# Patient Record
Sex: MALE | Race: WHITE | NOT HISPANIC OR LATINO | Employment: FULL TIME | URBAN - METROPOLITAN AREA
[De-identification: names, ages, dates, MRNs, and addresses within clinical notes are randomized per-mention and may not be internally consistent; named-entity substitution may affect disease eponyms.]

---

## 2018-02-12 ENCOUNTER — HOSPITAL ENCOUNTER (OUTPATIENT)
Dept: FAMILY MEDICINE CLINIC | Facility: CLINIC | Age: 53
Setting detail: SPECIMEN
Discharge: HOME | End: 2018-02-12
Attending: INTERNAL MEDICINE | Admitting: INTERNAL MEDICINE

## 2018-02-13 LAB
ALBUMIN SERPL-MCNC: 4.5 G/DL (ref 3.5–4.8)
ALBUMIN/GLOB SERPL: 1.4 {RATIO} (ref 1–1.7)
ALP SERPL-CCNC: 64 IU/L (ref 32–91)
ALT SERPL-CCNC: 26 IU/L (ref 17–63)
ANION GAP SERPL CALC-SCNC: 11.2 MMOL/L (ref 10–20)
AST SERPL-CCNC: 25 IU/L (ref 15–41)
BASOPHILS # BLD AUTO: 0 10*3/UL (ref 0–0.2)
BASOPHILS NFR BLD AUTO: 1 % (ref 0–2)
BILIRUB SERPL-MCNC: 1.1 MG/DL (ref 0.3–1.2)
BUN SERPL-MCNC: 9 MG/DL (ref 8–20)
BUN/CREAT SERPL: 11.3 (ref 6.2–20.3)
CALCIUM SERPL-MCNC: 9.4 MG/DL (ref 8.9–10.3)
CHLORIDE SERPL-SCNC: 103 MMOL/L (ref 101–111)
CHOLEST SERPL-MCNC: 150 MG/DL
CHOLEST/HDLC SERPL: 3.8 {RATIO}
CONV CO2: 28 MMOL/L (ref 22–32)
CONV LDL CHOLESTEROL DIRECT: 100 MG/DL (ref 0–100)
CONV TOTAL PROTEIN: 7.8 G/DL (ref 6.1–7.9)
CREAT UR-MCNC: 0.8 MG/DL (ref 0.7–1.2)
DIFFERENTIAL METHOD BLD: (no result)
EOSINOPHIL # BLD AUTO: 0.2 10*3/UL (ref 0–0.3)
EOSINOPHIL # BLD AUTO: 4 % (ref 0–3)
ERYTHROCYTE [DISTWIDTH] IN BLOOD BY AUTOMATED COUNT: 13.7 % (ref 11.5–14.5)
GLOBULIN UR ELPH-MCNC: 3.3 G/DL (ref 2.5–3.8)
GLUCOSE SERPL-MCNC: 85 MG/DL (ref 65–99)
HCT VFR BLD AUTO: 43.1 % (ref 40–54)
HDLC SERPL-MCNC: 39 MG/DL
HGB BLD-MCNC: 14.8 G/DL (ref 14–18)
LDLC/HDLC SERPL: 2.5 {RATIO}
LIPID INTERPRETATION: ABNORMAL
LYMPHOCYTES # BLD AUTO: 1.4 10*3/UL (ref 0.8–4.8)
LYMPHOCYTES NFR BLD AUTO: 27 % (ref 18–42)
MCH RBC QN AUTO: 29.2 PG (ref 26–32)
MCHC RBC AUTO-ENTMCNC: 34.4 G/DL (ref 32–36)
MCV RBC AUTO: 84.9 FL (ref 80–94)
MONOCYTES # BLD AUTO: 0.6 10*3/UL (ref 0.1–1.3)
MONOCYTES NFR BLD AUTO: 11 % (ref 2–11)
NEUTROPHILS # BLD AUTO: 3.1 10*3/UL (ref 2.3–8.6)
NEUTROPHILS NFR BLD AUTO: 57 % (ref 50–75)
NRBC BLD AUTO-RTO: 0 /100{WBCS}
NRBC/RBC NFR BLD MANUAL: 0 10*3/UL
PLATELET # BLD AUTO: 236 10*3/UL (ref 150–450)
PMV BLD AUTO: 9.8 FL (ref 7.4–10.4)
POTASSIUM SERPL-SCNC: 4.2 MMOL/L (ref 3.6–5.1)
RBC # BLD AUTO: 5.07 10*6/UL (ref 4.6–6)
SODIUM SERPL-SCNC: 138 MMOL/L (ref 136–144)
TRIGL SERPL-MCNC: 95 MG/DL
VLDLC SERPL CALC-MCNC: 11 MG/DL
WBC # BLD AUTO: 5.3 10*3/UL (ref 4.5–11.5)

## 2019-11-14 ENCOUNTER — TELEPHONE (OUTPATIENT)
Dept: FAMILY MEDICINE CLINIC | Facility: CLINIC | Age: 54
End: 2019-11-14

## 2019-11-14 NOTE — TELEPHONE ENCOUNTER
Patient called stating that he had biometric screening done for work and while filling out paperwork there was a question asking if patient had ever had trouble with memory issues. Patient checked yes, thinking that he has forgotten where he put his keys in the past or had forgotten a persons name. Patient states that after turning in paperwork he was contacted stating that he needed to get a letter from a specialist stating that his memory was ok and he was cleared to work. Patient states that this has been blown out of proportion and is asking if he can see you and get a paperwork signed off on that his memory is fine. Please advise

## 2019-11-15 ENCOUNTER — FLU SHOT (OUTPATIENT)
Dept: FAMILY MEDICINE CLINIC | Facility: CLINIC | Age: 54
End: 2019-11-15

## 2019-11-15 ENCOUNTER — TELEPHONE (OUTPATIENT)
Dept: FAMILY MEDICINE CLINIC | Facility: CLINIC | Age: 54
End: 2019-11-15

## 2019-11-15 DIAGNOSIS — Z23 NEEDS FLU SHOT: Primary | ICD-10-CM

## 2019-11-15 PROCEDURE — 90674 CCIIV4 VAC NO PRSV 0.5 ML IM: CPT | Performed by: INTERNAL MEDICINE

## 2019-11-15 PROCEDURE — 90471 IMMUNIZATION ADMIN: CPT | Performed by: INTERNAL MEDICINE

## 2019-11-15 NOTE — TELEPHONE ENCOUNTER
"Patient came to  stating that he filled out a work physical questionnaire and that one of the questions on it was \"Have you ever experienced memory loss?\", at the bottom of the questionnaire it stated if patient marked no and was not honest they would be dismissed from the company. Patient stated that he was forgetful at times so he marked \"Yes\" on the form, not realizing that they were asking if he had a serious illness . Patient then was assigned a  due to the company believing him having a chronic ongoing illness such as Dementia or Alzheimer's. Patient stated that he tried to clear up the issue but that they will not release him without medical clearance. Patient stated that he isn't sure if he needs to be seen by BFL or if she would be okay just writing a letter stating that patient is cleared for work.     Patient turned in paperwork to Chela Ness MA, for BFL to fill out. Patient is requesting for BFL to fill out paperwork and have it faxed to employer if possible.   "

## 2019-11-18 ENCOUNTER — OFFICE VISIT (OUTPATIENT)
Dept: FAMILY MEDICINE CLINIC | Facility: CLINIC | Age: 54
End: 2019-11-18

## 2019-11-18 VITALS
HEART RATE: 56 BPM | TEMPERATURE: 97.9 F | WEIGHT: 216.6 LBS | DIASTOLIC BLOOD PRESSURE: 86 MMHG | SYSTOLIC BLOOD PRESSURE: 154 MMHG | BODY MASS INDEX: 29.34 KG/M2 | OXYGEN SATURATION: 99 % | HEIGHT: 72 IN | RESPIRATION RATE: 12 BRPM

## 2019-11-18 DIAGNOSIS — I10 ESSENTIAL HYPERTENSION: ICD-10-CM

## 2019-11-18 DIAGNOSIS — Z00.00 PREVENTATIVE HEALTH CARE: ICD-10-CM

## 2019-11-18 DIAGNOSIS — R41.3 MEMORY CHANGES: Primary | ICD-10-CM

## 2019-11-18 DIAGNOSIS — Z12.5 SCREENING FOR PROSTATE CANCER: ICD-10-CM

## 2019-11-18 PROCEDURE — 36415 COLL VENOUS BLD VENIPUNCTURE: CPT | Performed by: INTERNAL MEDICINE

## 2019-11-18 PROCEDURE — 85007 BL SMEAR W/DIFF WBC COUNT: CPT | Performed by: INTERNAL MEDICINE

## 2019-11-18 PROCEDURE — 84443 ASSAY THYROID STIM HORMONE: CPT | Performed by: INTERNAL MEDICINE

## 2019-11-18 PROCEDURE — G0103 PSA SCREENING: HCPCS | Performed by: INTERNAL MEDICINE

## 2019-11-18 PROCEDURE — 80061 LIPID PANEL: CPT | Performed by: INTERNAL MEDICINE

## 2019-11-18 PROCEDURE — 85025 COMPLETE CBC W/AUTO DIFF WBC: CPT | Performed by: INTERNAL MEDICINE

## 2019-11-18 PROCEDURE — 99214 OFFICE O/P EST MOD 30 MIN: CPT | Performed by: INTERNAL MEDICINE

## 2019-11-18 PROCEDURE — 80053 COMPREHEN METABOLIC PANEL: CPT | Performed by: INTERNAL MEDICINE

## 2019-11-18 RX ORDER — LOSARTAN POTASSIUM 50 MG/1
50 TABLET ORAL DAILY
Refills: 3 | COMMUNITY
Start: 2019-11-08 | End: 2019-11-18 | Stop reason: SDUPTHER

## 2019-11-18 RX ORDER — LOSARTAN POTASSIUM 50 MG/1
50 TABLET ORAL DAILY
Qty: 90 TABLET | Refills: 3 | Status: SHIPPED | OUTPATIENT
Start: 2019-11-18 | End: 2020-02-03

## 2019-11-18 NOTE — PROGRESS NOTES
"Subjective   Kenny Cruz is a 54 y.o. male.     Pt is here for med check htn and also marked \"yes\" to memory difficulties on a work form.  Subsequently, work advised him to have further evaluation.  Pt says this was a mistake - he was thinking of normal age related forgetfulness, like misplacing keys and people's names.  Does not have any significant issues with ADLs, paying bills, etc.    Also has not been seen for >1 year.  Initially did not take his bp medication, but has been compliant since starting it.  No adverse effects.  Doesn't check his bp regularly, but was normal at work physical.  Also noted that when he exercises, bp is great, and read that limiting salt intake will lower bp.         The following portions of the patient's history were reviewed and updated as appropriate: allergies, current medications, past family history, past medical history, past social history, past surgical history and problem list.    Review of Systems   Constitutional: Negative for fatigue and fever.   HENT: Negative for congestion, ear pain, rhinorrhea and sore throat.    Eyes: Negative for blurred vision and itching.   Respiratory: Negative for cough and shortness of breath.    Cardiovascular: Negative for chest pain and palpitations.   Gastrointestinal: Negative for abdominal pain, diarrhea and vomiting.   Endocrine: Negative for polydipsia and polyuria.   Genitourinary: Negative for dysuria, frequency, hematuria and urgency.   Musculoskeletal: Negative for joint swelling and myalgias.   Skin: Negative for rash and skin lesions.   Neurological: Negative for dizziness, numbness and headache.   Psychiatric/Behavioral: Negative for depressed mood. The patient is not nervous/anxious.          Current Outpatient Medications:   •  losartan (COZAAR) 50 MG tablet, Take 50 mg by mouth Daily., Disp: , Rfl: 3    Objective   /86 (BP Location: Right arm, Patient Position: Sitting, Cuff Size: Adult)   Pulse 56   Temp 97.9 °F " "(36.6 °C) (Oral)   Resp 12   Ht 182.9 cm (72\")   Wt 98.2 kg (216 lb 9.6 oz)   SpO2 99%   BMI 29.38 kg/m²   Physical Exam   Constitutional: He is oriented to person, place, and time. He appears well-developed and well-nourished. No distress.   HENT:   Head: Normocephalic and atraumatic.   Right Ear: cerumen impaction is present.  Left Ear: An impacted cerumen is present.  Mouth/Throat: Oropharynx is clear and moist. No oropharyngeal exudate.   Eyes: Conjunctivae and EOM are normal. Right eye exhibits no discharge. Left eye exhibits no discharge. No scleral icterus.   Neck: Normal range of motion. Neck supple. No thyromegaly present.   Cardiovascular: Normal rate, regular rhythm and normal heart sounds. Exam reveals no gallop and no friction rub.   No murmur heard.  Pulmonary/Chest: Effort normal and breath sounds normal. No respiratory distress. He has no wheezes. He has no rales.   Abdominal: Soft. Bowel sounds are normal. He exhibits no distension. There is no tenderness. There is no guarding.   Musculoskeletal: Normal range of motion. He exhibits no edema or deformity.   Lymphadenopathy:     He has no cervical adenopathy.   Neurological: He is alert and oriented to person, place, and time. He has normal strength. No cranial nerve deficit or sensory deficit. Coordination and gait normal. GCS eye subscore is 4. GCS verbal subscore is 5. GCS motor subscore is 6.   MMSE:  Season: fall  Year: 2019  President: [facility] [place]: [place]  County: [place]  Floor: 3rd floor  Commands: passed  Write sentence: passed  3 words: passed  Clock: passed   Skin: Skin is warm and dry. No rash noted. He is not diaphoretic. No erythema.   Psychiatric: He has a normal mood and affect. His behavior is normal. Thought content normal.   Vitals reviewed.        Assessment/Plan   Problems Addressed this Visit     None      Visit Diagnoses     Memory changes    -  Primary    Essential hypertension        Relevant Medications    losartan " (COZAAR) 50 MG tablet    Preventative health care        Relevant Orders    CBC w AUTO Differential    Comprehensive metabolic panel    Lipid panel    TSH    CBC Auto Differential    Screening for prostate cancer        Relevant Orders    PSA SCREENING        Pt's history and exam consistent with normal age-related cognitive decline.  No need for further testing or referrals.  Pt will not need accommodations or restrictions, is allow to resume full duties.    Repeat manual bp 138/70.  Discussed that exercise and limiting salt intake will likely help with htn.  If he can do both consistently, then can dc bp med and see how bp readings are.  However, would not dc until he has consistently been able to exercise and limit salt, including in canned foods and boxed/preserved foods.  Due for labs - will check today         Procedures

## 2019-11-19 ENCOUNTER — TELEPHONE (OUTPATIENT)
Dept: FAMILY MEDICINE CLINIC | Facility: CLINIC | Age: 54
End: 2019-11-19

## 2019-11-19 LAB
ALBUMIN SERPL-MCNC: 4.6 G/DL (ref 3.5–5.2)
ALBUMIN/GLOB SERPL: 1.5 G/DL
ALP SERPL-CCNC: 64 U/L (ref 39–117)
ALT SERPL W P-5'-P-CCNC: 23 U/L (ref 1–41)
ANION GAP SERPL CALCULATED.3IONS-SCNC: 9.2 MMOL/L (ref 5–15)
AST SERPL-CCNC: 19 U/L (ref 1–40)
BASOPHILS # BLD MANUAL: 0.07 10*3/MM3 (ref 0–0.2)
BASOPHILS NFR BLD AUTO: 2 % (ref 0–1.5)
BILIRUB SERPL-MCNC: 0.8 MG/DL (ref 0.2–1.2)
BUN BLD-MCNC: 13 MG/DL (ref 6–20)
BUN/CREAT SERPL: 14.8 (ref 7–25)
CALCIUM SPEC-SCNC: 9.5 MG/DL (ref 8.6–10.5)
CHLORIDE SERPL-SCNC: 102 MMOL/L (ref 98–107)
CHOLEST SERPL-MCNC: 156 MG/DL (ref 0–200)
CO2 SERPL-SCNC: 31.8 MMOL/L (ref 22–29)
CREAT BLD-MCNC: 0.88 MG/DL (ref 0.76–1.27)
DEPRECATED RDW RBC AUTO: 42.2 FL (ref 37–54)
EOSINOPHIL # BLD MANUAL: 0.49 10*3/MM3 (ref 0–0.4)
EOSINOPHIL NFR BLD MANUAL: 14 % (ref 0.3–6.2)
ERYTHROCYTE [DISTWIDTH] IN BLOOD BY AUTOMATED COUNT: 13.1 % (ref 12.3–15.4)
GFR SERPL CREATININE-BSD FRML MDRD: 91 ML/MIN/1.73
GLOBULIN UR ELPH-MCNC: 3 GM/DL
GLUCOSE BLD-MCNC: 82 MG/DL (ref 65–99)
HCT VFR BLD AUTO: 42.2 % (ref 37.5–51)
HDLC SERPL-MCNC: 37 MG/DL (ref 40–60)
HGB BLD-MCNC: 14.5 G/DL (ref 13–17.7)
LDLC SERPL CALC-MCNC: 81 MG/DL (ref 0–100)
LDLC/HDLC SERPL: 2.18 {RATIO}
LYMPHOCYTES # BLD MANUAL: 2.19 10*3/MM3 (ref 0.7–3.1)
LYMPHOCYTES NFR BLD MANUAL: 14 % (ref 5–12)
LYMPHOCYTES NFR BLD MANUAL: 63 % (ref 19.6–45.3)
MCH RBC QN AUTO: 30.1 PG (ref 26.6–33)
MCHC RBC AUTO-ENTMCNC: 34.4 G/DL (ref 31.5–35.7)
MCV RBC AUTO: 87.6 FL (ref 79–97)
MONOCYTES # BLD AUTO: 0.49 10*3/MM3 (ref 0.1–0.9)
NEUTROPHILS # BLD AUTO: 0.24 10*3/MM3 (ref 1.7–7)
NEUTROPHILS NFR BLD MANUAL: 7 % (ref 42.7–76)
NRBC SPEC MANUAL: 1 /100 WBC (ref 0–0.2)
PLAT MORPH BLD: NORMAL
PLATELET # BLD AUTO: 192 10*3/MM3 (ref 140–450)
PMV BLD AUTO: 11.2 FL (ref 6–12)
POTASSIUM BLD-SCNC: 5 MMOL/L (ref 3.5–5.2)
PROT SERPL-MCNC: 7.6 G/DL (ref 6–8.5)
PSA SERPL-MCNC: 0.81 NG/ML (ref 0–4)
RBC # BLD AUTO: 4.82 10*6/MM3 (ref 4.14–5.8)
RBC MORPH BLD: NORMAL
SODIUM BLD-SCNC: 143 MMOL/L (ref 136–145)
TRIGL SERPL-MCNC: 192 MG/DL (ref 0–150)
TSH SERPL DL<=0.05 MIU/L-ACNC: 2.28 UIU/ML (ref 0.27–4.2)
VLDLC SERPL-MCNC: 38.4 MG/DL (ref 5–40)
WBC MORPH BLD: NORMAL
WBC NRBC COR # BLD: 3.48 10*3/MM3 (ref 3.4–10.8)

## 2019-11-19 NOTE — TELEPHONE ENCOUNTER
Patient left vm checking to see if letter has been faxed to his work. Patient states that employer needs paperwork by 11/20.

## 2019-11-19 NOTE — TELEPHONE ENCOUNTER
Patient was called and informed. Patient would like to  paperwork as well. Patient will  on Thursday.

## 2019-11-19 NOTE — TELEPHONE ENCOUNTER
VM MESSAGE.  Patient called to make sure you had sent the paperwork to his employer's medical office clearing him to go back to work. They need to receive it by tomorrow, 11/20/19, or he can't work. He would like to be called when it has been sent to them (can leave message). Thank you.

## 2019-11-19 NOTE — TELEPHONE ENCOUNTER
Yes, I completed it yesterday. I sent chart notes bc that's what it asked for, as well as the letter. Since it was late last night, was probably faxed in today

## 2020-02-03 RX ORDER — LOSARTAN POTASSIUM 50 MG/1
TABLET ORAL
Qty: 30 TABLET | Refills: 2 | Status: SHIPPED | OUTPATIENT
Start: 2020-02-03 | End: 2021-03-02

## 2021-02-09 RX ORDER — LOSARTAN POTASSIUM 50 MG/1
TABLET ORAL
Qty: 30 TABLET | Refills: 2 | OUTPATIENT
Start: 2021-02-09

## 2021-03-02 RX ORDER — LOSARTAN POTASSIUM 50 MG/1
TABLET ORAL
Qty: 30 TABLET | Refills: 2 | Status: SHIPPED | OUTPATIENT
Start: 2021-03-02 | End: 2021-06-09

## 2021-05-29 PROCEDURE — U0003 INFECTIOUS AGENT DETECTION BY NUCLEIC ACID (DNA OR RNA); SEVERE ACUTE RESPIRATORY SYNDROME CORONAVIRUS 2 (SARS-COV-2) (CORONAVIRUS DISEASE [COVID-19]), AMPLIFIED PROBE TECHNIQUE, MAKING USE OF HIGH THROUGHPUT TECHNOLOGIES AS DESCRIBED BY CMS-2020-01-R: HCPCS | Performed by: FAMILY MEDICINE

## 2021-05-29 PROCEDURE — 87081 CULTURE SCREEN ONLY: CPT | Performed by: FAMILY MEDICINE

## 2021-06-08 RX ORDER — LOSARTAN POTASSIUM 50 MG/1
TABLET ORAL
Qty: 30 TABLET | Refills: 2 | OUTPATIENT
Start: 2021-06-08

## 2021-06-09 ENCOUNTER — OFFICE VISIT (OUTPATIENT)
Dept: FAMILY MEDICINE CLINIC | Facility: CLINIC | Age: 56
End: 2021-06-09

## 2021-06-09 VITALS
BODY MASS INDEX: 33.18 KG/M2 | DIASTOLIC BLOOD PRESSURE: 90 MMHG | HEART RATE: 79 BPM | OXYGEN SATURATION: 97 % | TEMPERATURE: 98.5 F | HEIGHT: 72 IN | SYSTOLIC BLOOD PRESSURE: 140 MMHG | WEIGHT: 245 LBS

## 2021-06-09 DIAGNOSIS — I10 ESSENTIAL HYPERTENSION: Primary | ICD-10-CM

## 2021-06-09 PROCEDURE — 99214 OFFICE O/P EST MOD 30 MIN: CPT | Performed by: NURSE PRACTITIONER

## 2021-06-09 RX ORDER — LOSARTAN POTASSIUM AND HYDROCHLOROTHIAZIDE 12.5; 5 MG/1; MG/1
1 TABLET ORAL DAILY
Qty: 90 TABLET | Refills: 1 | Status: SHIPPED | OUTPATIENT
Start: 2021-06-09 | End: 2022-02-01 | Stop reason: SDUPTHER

## 2021-06-09 NOTE — ASSESSMENT & PLAN NOTE
Hypertension is worsening.  Dietary sodium restriction.  Weight loss.  Regular aerobic exercise.  Medication changes per orders.  Blood pressure will be reassessed in 4 weeks.  Repeat BP was 148/96. Will add hctz to losartan. Monitor BP at home, work on lifestlye changes, and follow up in 1 month.

## 2021-06-09 NOTE — PROGRESS NOTES
"Chago Cruz is a 55 y.o. male.     Chief Complaint   Patient presents with   • Hypertension     follow up on meds       /90 (BP Location: Right arm, Patient Position: Sitting, Cuff Size: Adult)   Pulse 79   Temp 98.5 °F (36.9 °C) (Skin)   Ht 182.9 cm (72\")   Wt 111 kg (245 lb)   SpO2 97%   BMI 33.23 kg/m²     BP Readings from Last 3 Encounters:   06/09/21 140/90   05/29/21 151/90   11/18/19 154/86       Wt Readings from Last 3 Encounters:   06/09/21 111 kg (245 lb)   05/29/21 99.8 kg (220 lb)   11/18/19 98.2 kg (216 lb 9.6 oz)       Pt comes in today for follow up on BP. Hasn't been seen since 2019. He takes losartan 50mg daily. Does get occasionally get headaches when he wakes up.   Denies any CP, SOA, palpitations, dizziness.        The following portions of the patient's history were reviewed and updated as appropriate: allergies, current medications, past family history, past medical history, past social history, past surgical history and problem list.    Review of Systems    Objective   Physical Exam  Constitutional:       Appearance: He is well-developed.   Eyes:      Pupils: Pupils are equal, round, and reactive to light.   Cardiovascular:      Rate and Rhythm: Normal rate and regular rhythm.   Pulmonary:      Effort: Pulmonary effort is normal.      Breath sounds: Normal breath sounds.   Neurological:      Mental Status: He is alert and oriented to person, place, and time.           Diagnoses and all orders for this visit:    1. Essential hypertension (Primary)  Assessment & Plan:  Hypertension is worsening.  Dietary sodium restriction.  Weight loss.  Regular aerobic exercise.  Medication changes per orders.  Blood pressure will be reassessed in 4 weeks.  Repeat BP was 148/96. Will add hctz to losartan. Monitor BP at home, work on lifestlye changes, and follow up in 1 month.     Orders:  -     losartan-hydrochlorothiazide (Hyzaar) 50-12.5 MG per tablet; Take 1 tablet by mouth Daily. "  Dispense: 90 tablet; Refill: 1  will add hctz  Follow up in 1 month for routine physical  During this office visit, we discussed the pertinent aspects of the visit and treatment recommendations. Pt verbalizes understanding. Follow up was discussed. Patient was given the opportunity to ask questions and discuss other concerns.         Return in about 4 weeks (around 7/7/2021) for Annual physical.

## 2022-02-01 DIAGNOSIS — I10 ESSENTIAL HYPERTENSION: ICD-10-CM

## 2022-02-01 RX ORDER — LOSARTAN POTASSIUM AND HYDROCHLOROTHIAZIDE 12.5; 5 MG/1; MG/1
1 TABLET ORAL DAILY
Qty: 90 TABLET | Refills: 1 | Status: SHIPPED | OUTPATIENT
Start: 2022-02-01 | End: 2022-07-11

## 2022-02-01 NOTE — TELEPHONE ENCOUNTER
Caller: [facility] - [place], [place] - [address] [place] [phone] [place] [address] [phone] FX    Relationship: Pharmacy    Best call back number: [phone]  Requested Prescriptions:   Requested Prescriptions     Pending Prescriptions Disp Refills   • losartan-hydrochlorothiazide (Hyzaar) 50-12.5 MG per tablet 90 tablet 1     Sig: Take 1 tablet by mouth Daily.        Pharmacy where request should be sent: [facility] - [place], [place] - [address] [place], [place] [phone] [place] [address] [phone] FX     Additional details provided by patient: REF [id number]    Does the patient have less than a 3 day supply:  [] Yes  [x] No    Sathya Campbell   02/01/22 15:07 EST

## 2022-07-11 DIAGNOSIS — I10 ESSENTIAL HYPERTENSION: ICD-10-CM

## 2022-07-11 RX ORDER — LOSARTAN POTASSIUM AND HYDROCHLOROTHIAZIDE 12.5; 5 MG/1; MG/1
TABLET ORAL
Qty: 90 TABLET | Refills: 0 | Status: SHIPPED | OUTPATIENT
Start: 2022-07-11 | End: 2022-10-10

## 2022-10-10 DIAGNOSIS — I10 ESSENTIAL HYPERTENSION: ICD-10-CM

## 2022-10-10 RX ORDER — LOSARTAN POTASSIUM AND HYDROCHLOROTHIAZIDE 12.5; 5 MG/1; MG/1
TABLET ORAL
Qty: 90 TABLET | Refills: 0 | Status: SHIPPED | OUTPATIENT
Start: 2022-10-10

## 2023-01-09 ENCOUNTER — TELEPHONE (OUTPATIENT)
Dept: FAMILY MEDICINE CLINIC | Facility: CLINIC | Age: 58
End: 2023-01-09
Payer: COMMERCIAL

## 2023-01-09 DIAGNOSIS — I10 ESSENTIAL HYPERTENSION: ICD-10-CM

## 2023-01-09 RX ORDER — LOSARTAN POTASSIUM AND HYDROCHLOROTHIAZIDE 12.5; 5 MG/1; MG/1
TABLET ORAL
Qty: 90 TABLET | Refills: 3 | OUTPATIENT
Start: 2023-01-09

## 2023-01-09 NOTE — TELEPHONE ENCOUNTER
Sent patient a text message stating that his rx was denied because he hasn't been seen in office in over a year and a half and to please call the office for an appt with [facility] to follow up on his bp.

## 2023-10-10 DIAGNOSIS — I10 ESSENTIAL HYPERTENSION: ICD-10-CM

## 2023-10-10 RX ORDER — LOSARTAN POTASSIUM AND HYDROCHLOROTHIAZIDE 12.5; 5 MG/1; MG/1
1 TABLET ORAL DAILY
Qty: 90 TABLET | Refills: 0 | Status: SHIPPED | OUTPATIENT
Start: 2023-10-10

## 2023-10-10 NOTE — TELEPHONE ENCOUNTER
Caller: TAMRA MINAYA    Relationship: Emergency Contact    Best call back number: [phone]    Requested Prescriptions:   Requested Prescriptions     Pending Prescriptions Disp Refills    losartan-hydrochlorothiazide (HYZAAR) 50-12.5 MG per tablet 90 tablet 0     Sig: Take 1 tablet by mouth Daily.        Pharmacy where request should be sent: [id number] - [place], [place] - [address] [place] [address] - [phone] [facility] [phone]      Last office visit with prescribing clinician: Visit date not found   Last telemedicine visit with prescribing clinician: Visit date not found   Next office visit with prescribing clinician: Visit date not found     Additional details provided by patient:     Does the patient have less than a 3 day supply:  [x] Yes  [] No    Would you like a call back once the refill request has been completed: [x] Yes [] No    If the office needs to give you a call back, can they leave a voicemail: [x] Yes [] No    Izabella Crowley Rep   10/10/23 11:04 EDT

## 2023-10-19 ENCOUNTER — OFFICE VISIT (OUTPATIENT)
Dept: FAMILY MEDICINE CLINIC | Facility: CLINIC | Age: 58
End: 2023-10-19
Payer: COMMERCIAL

## 2023-10-19 VITALS
OXYGEN SATURATION: 95 % | HEART RATE: 54 BPM | WEIGHT: 250 LBS | HEIGHT: 72 IN | DIASTOLIC BLOOD PRESSURE: 86 MMHG | SYSTOLIC BLOOD PRESSURE: 130 MMHG | BODY MASS INDEX: 33.86 KG/M2 | RESPIRATION RATE: 16 BRPM

## 2023-10-19 DIAGNOSIS — I10 ESSENTIAL HYPERTENSION: ICD-10-CM

## 2023-10-19 PROCEDURE — 99213 OFFICE O/P EST LOW 20 MIN: CPT | Performed by: PHYSICIAN ASSISTANT

## 2023-10-19 RX ORDER — LOSARTAN POTASSIUM AND HYDROCHLOROTHIAZIDE 12.5; 5 MG/1; MG/1
1 TABLET ORAL DAILY
Qty: 90 TABLET | Refills: 3 | Status: SHIPPED | OUTPATIENT
Start: 2023-10-19

## 2023-10-19 NOTE — PROGRESS NOTES
"Subjective   Kenny Cruz is a 58 y.o. male.     Chief Complaint   Patient presents with    Follow-up     meds       /86 (BP Location: Right arm, Patient Position: Sitting, Cuff Size: Large Adult)   Pulse 54   Resp 16   Ht 182.9 cm (72\")   Wt 113 kg (250 lb)   SpO2 95%   BMI 33.91 kg/m²     BP Readings from Last 3 Encounters:   10/19/23 130/86   06/09/21 140/90   05/29/21 151/90       Wt Readings from Last 3 Encounters:   10/19/23 113 kg (250 lb)   06/09/21 111 kg (245 lb)   05/29/21 99.8 kg (220 lb)       HPI Presents to the clinic for htn. He has a physical for work and his blood pressure was normal at work. He has not had chest pain, soa, regular headaches, abdominal pain. Works for the [employer] and has regular screening for vision. Has some headaches in the morning. He has not been tested for cpap. He is taking losartan/hctz regularly and does not miss doses.     The following portions of the patient's history were reviewed and updated as appropriate: allergies, current medications, past family history, past medical history, past social history, past surgical history, and problem list.    Review of Systems    Objective   Physical Exam  Constitutional:       Appearance: Normal appearance.   Eyes:      Extraocular Movements: Extraocular movements intact.      Pupils: Pupils are equal, round, and reactive to light.   Cardiovascular:      Rate and Rhythm: Normal rate.      Heart sounds: No murmur heard.  Pulmonary:      Effort: Pulmonary effort is normal.      Breath sounds: No wheezing.   Neurological:      General: No focal deficit present.      Mental Status: He is alert and oriented to person, place, and time.   Psychiatric:         Mood and Affect: Mood normal.         Behavior: Behavior normal.           Diagnoses and all orders for this visit:    1. Essential hypertension  -     losartan-hydrochlorothiazide (HYZAAR) 50-12.5 MG per tablet; Take 1 tablet by mouth Daily.  Dispense: 90 tablet; Refill: " 3  -     CBC w AUTO Differential; Future  -     Comprehensive metabolic panel; Future    Schedule for physical for the rest of labs with pcp. Schedule colonoscopy. Check psa    Return if symptoms worsen or fail to improve, for Recheck.

## 2024-10-16 DIAGNOSIS — I10 ESSENTIAL HYPERTENSION: ICD-10-CM

## 2024-10-16 RX ORDER — LOSARTAN POTASSIUM AND HYDROCHLOROTHIAZIDE 12.5; 5 MG/1; MG/1
1 TABLET ORAL DAILY
Qty: 90 TABLET | Refills: 3 | Status: SHIPPED | OUTPATIENT
Start: 2024-10-16

## 2024-11-15 ENCOUNTER — OFFICE VISIT (OUTPATIENT)
Dept: FAMILY MEDICINE CLINIC | Facility: CLINIC | Age: 59
End: 2024-11-15
Payer: COMMERCIAL

## 2024-11-15 ENCOUNTER — HOSPITAL ENCOUNTER (INPATIENT)
Facility: HOSPITAL | Age: 59
LOS: 2 days | Discharge: HOME | DRG: 310 | End: 2024-11-19
Attending: EMERGENCY MEDICINE
Payer: COMMERCIAL

## 2024-11-15 VITALS
OXYGEN SATURATION: 100 % | RESPIRATION RATE: 20 BRPM | HEIGHT: 72 IN | WEIGHT: 263 LBS | HEART RATE: 128 BPM | BODY MASS INDEX: 35.62 KG/M2 | DIASTOLIC BLOOD PRESSURE: 64 MMHG | SYSTOLIC BLOOD PRESSURE: 124 MMHG

## 2024-11-15 DIAGNOSIS — I48.91 ATRIAL FIBRILLATION WITH RAPID VENTRICULAR RESPONSE: Primary | ICD-10-CM

## 2024-11-15 DIAGNOSIS — R94.31 ABNORMAL ECG: ICD-10-CM

## 2024-11-15 DIAGNOSIS — R00.0 SINUS TACHYCARDIA: Primary | ICD-10-CM

## 2024-11-15 LAB
ANION GAP SERPL CALCULATED.3IONS-SCNC: 11.3 MMOL/L (ref 5–15)
BASOPHILS # BLD AUTO: 0.07 10*3/MM3 (ref 0–0.2)
BASOPHILS NFR BLD AUTO: 0.6 % (ref 0–1.5)
BUN SERPL-MCNC: 11 MG/DL (ref 6–20)
BUN/CREAT SERPL: 9.6 (ref 7–25)
CALCIUM SPEC-SCNC: 9.3 MG/DL (ref 8.6–10.5)
CHLORIDE SERPL-SCNC: 101 MMOL/L (ref 98–107)
CO2 SERPL-SCNC: 25.7 MMOL/L (ref 22–29)
CREAT SERPL-MCNC: 1.15 MG/DL (ref 0.76–1.27)
DEPRECATED RDW RBC AUTO: 43.3 FL (ref 37–54)
EGFRCR SERPLBLD CKD-EPI 2021: 74.2 ML/MIN/1.73
EOSINOPHIL # BLD AUTO: 0.24 10*3/MM3 (ref 0–0.4)
EOSINOPHIL NFR BLD AUTO: 2.1 % (ref 0.3–6.2)
ERYTHROCYTE [DISTWIDTH] IN BLOOD BY AUTOMATED COUNT: 13.2 % (ref 12.3–15.4)
GLUCOSE SERPL-MCNC: 103 MG/DL (ref 65–99)
HCT VFR BLD AUTO: 42.3 % (ref 37.5–51)
HGB BLD-MCNC: 14.2 G/DL (ref 13–17.7)
HOLD SPECIMEN: NORMAL
HOLD SPECIMEN: NORMAL
IMM GRANULOCYTES # BLD AUTO: 0.03 10*3/MM3 (ref 0–0.05)
IMM GRANULOCYTES NFR BLD AUTO: 0.3 % (ref 0–0.5)
LYMPHOCYTES # BLD AUTO: 1.67 10*3/MM3 (ref 0.7–3.1)
LYMPHOCYTES NFR BLD AUTO: 14.9 % (ref 19.6–45.3)
MAGNESIUM SERPL-MCNC: 2 MG/DL (ref 1.6–2.6)
MCH RBC QN AUTO: 30 PG (ref 26.6–33)
MCHC RBC AUTO-ENTMCNC: 33.6 G/DL (ref 31.5–35.7)
MCV RBC AUTO: 89.4 FL (ref 79–97)
MONOCYTES # BLD AUTO: 1.67 10*3/MM3 (ref 0.1–0.9)
MONOCYTES NFR BLD AUTO: 14.9 % (ref 5–12)
NEUTROPHILS NFR BLD AUTO: 67.2 % (ref 42.7–76)
NEUTROPHILS NFR BLD AUTO: 7.54 10*3/MM3 (ref 1.7–7)
NRBC BLD AUTO-RTO: 0 /100 WBC (ref 0–0.2)
PLATELET # BLD AUTO: 215 10*3/MM3 (ref 140–450)
PMV BLD AUTO: 11.1 FL (ref 6–12)
POTASSIUM SERPL-SCNC: 3.9 MMOL/L (ref 3.5–5.2)
QT INTERVAL: 431 MS
QTC INTERVAL: 567 MS
RBC # BLD AUTO: 4.73 10*6/MM3 (ref 4.14–5.8)
SODIUM SERPL-SCNC: 138 MMOL/L (ref 136–145)
TSH SERPL DL<=0.05 MIU/L-ACNC: 2.8 UIU/ML (ref 0.27–4.2)
WBC NRBC COR # BLD AUTO: 11.22 10*3/MM3 (ref 3.4–10.8)
WHOLE BLOOD HOLD COAG: NORMAL

## 2024-11-15 PROCEDURE — 93005 ELECTROCARDIOGRAM TRACING: CPT | Performed by: INTERNAL MEDICINE

## 2024-11-15 PROCEDURE — 25010000002 ENOXAPARIN PER 10 MG: Performed by: EMERGENCY MEDICINE

## 2024-11-15 PROCEDURE — 93005 ELECTROCARDIOGRAM TRACING: CPT

## 2024-11-15 PROCEDURE — G0378 HOSPITAL OBSERVATION PER HR: HCPCS

## 2024-11-15 PROCEDURE — 84443 ASSAY THYROID STIM HORMONE: CPT | Performed by: EMERGENCY MEDICINE

## 2024-11-15 PROCEDURE — 99291 CRITICAL CARE FIRST HOUR: CPT

## 2024-11-15 PROCEDURE — 99285 EMERGENCY DEPT VISIT HI MDM: CPT

## 2024-11-15 PROCEDURE — 83735 ASSAY OF MAGNESIUM: CPT | Performed by: EMERGENCY MEDICINE

## 2024-11-15 PROCEDURE — 93005 ELECTROCARDIOGRAM TRACING: CPT | Performed by: EMERGENCY MEDICINE

## 2024-11-15 PROCEDURE — 80048 BASIC METABOLIC PNL TOTAL CA: CPT | Performed by: EMERGENCY MEDICINE

## 2024-11-15 PROCEDURE — 85025 COMPLETE CBC W/AUTO DIFF WBC: CPT | Performed by: EMERGENCY MEDICINE

## 2024-11-15 RX ORDER — ENOXAPARIN SODIUM 150 MG/ML
1 INJECTION SUBCUTANEOUS ONCE
Status: COMPLETED | OUTPATIENT
Start: 2024-11-15 | End: 2024-11-15

## 2024-11-15 RX ORDER — ACETAMINOPHEN 500 MG
500 TABLET ORAL EVERY 6 HOURS PRN
COMMUNITY

## 2024-11-15 RX ORDER — SODIUM CHLORIDE 0.9 % (FLUSH) 0.9 %
10 SYRINGE (ML) INJECTION AS NEEDED
Status: DISCONTINUED | OUTPATIENT
Start: 2024-11-15 | End: 2024-11-19 | Stop reason: HOSPADM

## 2024-11-15 RX ORDER — DILTIAZEM HCL/D5W 125 MG/125
5-15 PLASTIC BAG, INJECTION (ML) INTRAVENOUS
Status: DISCONTINUED | OUTPATIENT
Start: 2024-11-15 | End: 2024-11-16

## 2024-11-15 RX ORDER — ENOXAPARIN SODIUM 150 MG/ML
1 INJECTION SUBCUTANEOUS EVERY 12 HOURS
Status: DISCONTINUED | OUTPATIENT
Start: 2024-11-16 | End: 2024-11-16

## 2024-11-15 RX ORDER — ACETAMINOPHEN 160 MG/5ML
650 SOLUTION ORAL EVERY 4 HOURS PRN
Status: DISCONTINUED | OUTPATIENT
Start: 2024-11-15 | End: 2024-11-19 | Stop reason: HOSPADM

## 2024-11-15 RX ORDER — NITROGLYCERIN 0.4 MG/1
0.4 TABLET SUBLINGUAL
Status: DISCONTINUED | OUTPATIENT
Start: 2024-11-15 | End: 2024-11-19 | Stop reason: HOSPADM

## 2024-11-15 RX ORDER — BISACODYL 10 MG
10 SUPPOSITORY, RECTAL RECTAL DAILY PRN
Status: DISCONTINUED | OUTPATIENT
Start: 2024-11-15 | End: 2024-11-19 | Stop reason: HOSPADM

## 2024-11-15 RX ORDER — ACETAMINOPHEN 325 MG/1
650 TABLET ORAL EVERY 4 HOURS PRN
Status: DISCONTINUED | OUTPATIENT
Start: 2024-11-15 | End: 2024-11-19 | Stop reason: HOSPADM

## 2024-11-15 RX ORDER — IPRATROPIUM BROMIDE AND ALBUTEROL SULFATE 2.5; .5 MG/3ML; MG/3ML
3 SOLUTION RESPIRATORY (INHALATION) EVERY 4 HOURS PRN
Status: DISCONTINUED | OUTPATIENT
Start: 2024-11-15 | End: 2024-11-17

## 2024-11-15 RX ORDER — DILTIAZEM HYDROCHLORIDE 5 MG/ML
20 INJECTION INTRAVENOUS ONCE
Status: COMPLETED | OUTPATIENT
Start: 2024-11-15 | End: 2024-11-15

## 2024-11-15 RX ORDER — POLYETHYLENE GLYCOL 3350 17 G/17G
17 POWDER, FOR SOLUTION ORAL DAILY PRN
Status: DISCONTINUED | OUTPATIENT
Start: 2024-11-15 | End: 2024-11-19 | Stop reason: HOSPADM

## 2024-11-15 RX ORDER — DILTIAZEM HYDROCHLORIDE 5 MG/ML
25 INJECTION INTRAVENOUS ONCE
Status: COMPLETED | OUTPATIENT
Start: 2024-11-15 | End: 2024-11-15

## 2024-11-15 RX ORDER — ONDANSETRON 4 MG/1
4 TABLET, ORALLY DISINTEGRATING ORAL EVERY 6 HOURS PRN
Status: DISCONTINUED | OUTPATIENT
Start: 2024-11-15 | End: 2024-11-19 | Stop reason: HOSPADM

## 2024-11-15 RX ORDER — BISACODYL 5 MG/1
5 TABLET, DELAYED RELEASE ORAL DAILY PRN
Status: DISCONTINUED | OUTPATIENT
Start: 2024-11-15 | End: 2024-11-19 | Stop reason: HOSPADM

## 2024-11-15 RX ORDER — AMOXICILLIN 250 MG
2 CAPSULE ORAL 2 TIMES DAILY PRN
Status: DISCONTINUED | OUTPATIENT
Start: 2024-11-15 | End: 2024-11-19 | Stop reason: HOSPADM

## 2024-11-15 RX ORDER — GUAIFENESIN 600 MG/1
600 TABLET, EXTENDED RELEASE ORAL EVERY 12 HOURS SCHEDULED
Status: DISCONTINUED | OUTPATIENT
Start: 2024-11-15 | End: 2024-11-19 | Stop reason: HOSPADM

## 2024-11-15 RX ORDER — SODIUM CHLORIDE 9 MG/ML
40 INJECTION, SOLUTION INTRAVENOUS AS NEEDED
Status: DISCONTINUED | OUTPATIENT
Start: 2024-11-15 | End: 2024-11-19 | Stop reason: HOSPADM

## 2024-11-15 RX ORDER — SODIUM CHLORIDE 0.9 % (FLUSH) 0.9 %
10 SYRINGE (ML) INJECTION EVERY 12 HOURS SCHEDULED
Status: DISCONTINUED | OUTPATIENT
Start: 2024-11-15 | End: 2024-11-19 | Stop reason: HOSPADM

## 2024-11-15 RX ORDER — ONDANSETRON 2 MG/ML
4 INJECTION INTRAMUSCULAR; INTRAVENOUS EVERY 6 HOURS PRN
Status: DISCONTINUED | OUTPATIENT
Start: 2024-11-15 | End: 2024-11-19 | Stop reason: HOSPADM

## 2024-11-15 RX ORDER — ACETAMINOPHEN 650 MG/1
650 SUPPOSITORY RECTAL EVERY 4 HOURS PRN
Status: DISCONTINUED | OUTPATIENT
Start: 2024-11-15 | End: 2024-11-19 | Stop reason: HOSPADM

## 2024-11-15 RX ADMIN — Medication 5 MG/HR: at 18:02

## 2024-11-15 RX ADMIN — DILTIAZEM HYDROCHLORIDE 25 MG: 5 INJECTION, SOLUTION INTRAVENOUS at 18:33

## 2024-11-15 RX ADMIN — ENOXAPARIN SODIUM 120 MG: 150 INJECTION SUBCUTANEOUS at 19:46

## 2024-11-15 RX ADMIN — DILTIAZEM HYDROCHLORIDE 20 MG: 5 INJECTION, SOLUTION INTRAVENOUS at 17:09

## 2024-11-15 RX ADMIN — ACETAMINOPHEN 650 MG: 325 TABLET, FILM COATED ORAL at 21:31

## 2024-11-15 RX ADMIN — Medication 10 ML: at 21:31

## 2024-11-15 RX ADMIN — GUAIFENESIN 600 MG: 600 TABLET, EXTENDED RELEASE ORAL at 21:31

## 2024-11-15 NOTE — PROGRESS NOTES
"Chief Complaint  Chief Complaint   Patient presents with    Cough     Went to [facility] [place] tuesday       Subjective        Kenny Cruz is a 59 y.o. male who presents to [facility].  History of Present Illness  Presents today for elevated heart rate.  He went to [facility] 2 days ago for a cough.  Diagnosed with bronchitis and prescribed steroid pack, Tessalon Perles, and albuterol inhaler.  Reports he has felt so bad that he has not been able to  the medications.  Began getting hot this morning and while he was laying in bed, he could hear his heartbeat in his ear with his ear pressed on his pillow.  Reports \"hallucinating in his head\" this morning.  Denies hearing or seeing things that other people are not seeing/hearing.  States he hallucinates when he has a fever.  His wife states she took his temperature on his forehead last night, it was 102F.   He has been taking theraflu (APAP, dextromethorphan, and phenylephrine).   His wife states he is not staying well hydrated, he does not drink enough water even when he is not ill.   His wife states he drinks mountain dew and cokes.   Denies chest pain, pressure, tightness, SOA, abdominal pain, diaphoresis, anxiety, stimulant/drug use.     Objective   /64 (BP Location: Left arm)   Pulse (!) 128   Resp 20   Ht 182.9 cm (72.01\")   Wt 119 kg (263 lb)   SpO2 100%   BMI 35.66 kg/m²     Estimated body mass index is 35.66 kg/m² as calculated from the following:    Height as of this encounter: 182.9 cm (72.01\").    Weight as of this encounter: 119 kg (263 lb).     Physical Exam   GEN: In no acute distress, non toxic appearing  HEENT: Cerumen in bilateral EACs. Mucous membranes moist. Oropharynx without erythema or exudate. No cervical or submandibular lymphadenopathy.  CV: tachycardia, Regular rhythm, no murmurs, 2+ peripheral pulses, No extremity edema.   RESP: expiratory wheezes in BLL. Lungs clear to auscultation anteriorly and " posteriorly in all lung fields bilaterally.   PSYCH: Affect normal, insight fair     PHQ-2 Depression Screening  Little interest or pleasure in doing things? Not at all   Feeling down, depressed, or hopeless? Not at all   PHQ-2 Total Score 0         ECG 12 Lead    Date/Time: 11/15/2024 3:36 PM  Performed by: Анна Cruz PA-C    Authorized by: Анна Cruz PA-C  Previous ECG: no previous ECG available  Rhythm: sinus tachycardia  Rate: tachycardic  Conduction: incomplete right bundle branch block and left posterior fascicular block  Q waves: II, III, aVR and aVF    ST Elevation: II and aVF  QRS axis: normal    Clinical impression: abnormal EKG and myocardial injury         Result Review :              Assessment and Plan     Diagnoses and all orders for this visit:    1. Sinus tachycardia (Primary)    2. Abnormal ECG    Other orders  -     ECG 12 Lead    Due to the EKG results documented above, discussed for him to proceed to the ER for further evaluation, treatment, and management.  He understands the importance of proceeding to the ER immediately for further evaluation.      Follow Up     Return if symptoms worsen or fail to improve.

## 2024-11-15 NOTE — Clinical Note
Level of Care: Telemetry [5]   Admitting Physician: ARNULFO GALLARDO [[id number]]   Attending Physician: ARNULFO GALLARDO [[id number]]   Bed Request Comments: pcu

## 2024-11-15 NOTE — ED PROVIDER NOTES
Subjective   History of Present Illness  Chief complaint rapid heartbeat    History of present is a 59-year-old gentleman sent from the urgent care for rapid heartbeat.  The patient's had a cold cough congestion recently.  He initially been seen at the urgent care and diagnosed with bronchitis.  He was taking TheraFlu he took some last night today he noticed his heart was running fast.  He had with the lightheadedness and dizziness.  He went to urgent care and he was referred here for heart rate that is running in the 150s.  He denies any chest pain neck arm jaw pain or shortness of breath.  He has had no fever chills but just cough congestion.  No leg pain or swelling no foreign travels no recent long car ride plane or immobilization antibiotic use or hospitalization.      Review of Systems   Constitutional:  Negative for chills and fever.   HENT:  Positive for congestion.    Respiratory:  Positive for cough. Negative for chest tightness and shortness of breath.    Cardiovascular:  Positive for palpitations. Negative for chest pain.   Gastrointestinal:  Negative for abdominal pain and vomiting.   Musculoskeletal:  Negative for back pain and neck pain.   Skin:  Negative for rash.   Neurological:  Positive for light-headedness. Negative for dizziness, facial asymmetry and speech difficulty.       Past Medical History:   Diagnosis Date    Hypertension        No Known Allergies    No past surgical history on file.    Family History   Problem Relation Age of Onset    Valvular heart disease Mother     Other Mother         pacemaker    Coronary artery disease Father         CABG x 2     Diabetes Sister     Obesity Sister        Social History     Socioeconomic History    Marital status:     Number of children: 1   Tobacco Use    Smoking status: Never    Smokeless tobacco: Never   Vaping Use    Vaping status: Never Used   Substance and Sexual Activity    Alcohol use: Yes     Comment: rare    Drug use: No    Sexual  activity: Defer     Prior to Admission medications    Medication Sig Start Date End Date Taking? Authorizing Provider   albuterol sulfate  (90 Base) MCG/ACT inhaler Inhale 2 puffs Every 4 (Four) Hours As Needed for Wheezing or Shortness of Air. 11/13/24   Shonda Colunga APRN   benzonatate (TESSALON) 200 MG capsule Take 1 capsule by mouth 3 (Three) Times a Day As Needed for Cough.  Patient not taking: Reported on 11/15/2024 11/13/24   Shonda Colunga APRN   losartan-hydrochlorothiazide (HYZAAR) 50-12.5 MG per tablet TAKE 1 TABLET DAILY 10/16/24   Jonathan Mcdonald PA-C   predniSONE (DELTASONE) 10 MG (21) dose pack Take  by mouth Daily for 6 days. Use as directed on package  Patient not taking: Reported on 11/15/2024 11/13/24 11/19/24  Shonda Colunga APRN            Objective   Physical Exam  Constitutional 59-year-old gentleman awake alert no distress triage vital signs reviewed heart rate 160 on the monitor looks to be A-fib versus a flutter.  HEENT extraocular muscles are intact pupils equal and reactive mouth clear neck supple no adenopathy no JV no bruits lungs clear no retraction heart regular but tachycardic without murmur rub abdomen soft nontender good bowel sounds no peritoneal findings or pulsatile masses extremities pulses equal upper and lower extremities no edema no cords no Homans' sign no evidence of DVT skin is warm and dry without rashes or cellulitic changes neurologic awake alert follows commands motor strength normal without focal weakness  Procedures           ED Course      Results for orders placed or performed during the hospital encounter of 11/15/24   ECG 12 Lead Tachycardia    Collection Time: 11/15/24  4:26 PM   Result Value Ref Range    QT Interval 365 ms    QTC Interval 583 ms   Green Top (Gel)    Collection Time: 11/15/24  5:02 PM   Result Value Ref Range    Extra Tube Hold for add-ons.    Gold Top - SST    Collection Time: 11/15/24  5:02 PM   Result Value Ref Range     Extra Tube Hold for add-ons.    Light Blue Top    Collection Time: 11/15/24  5:02 PM   Result Value Ref Range    Extra Tube Hold for add-ons.    Basic Metabolic Panel    Collection Time: 11/15/24  5:03 PM    Specimen: Blood   Result Value Ref Range    Glucose 103 (H) 65 - 99 mg/dL    BUN 11 6 - 20 mg/dL    Creatinine 1.15 0.76 - 1.27 mg/dL    Sodium 138 136 - 145 mmol/L    Potassium 3.9 3.5 - 5.2 mmol/L    Chloride 101 98 - 107 mmol/L    CO2 25.7 22.0 - 29.0 mmol/L    Calcium 9.3 8.6 - 10.5 mg/dL    BUN/Creatinine Ratio 9.6 7.0 - 25.0    Anion Gap 11.3 5.0 - 15.0 mmol/L    eGFR 74.2 >60.0 mL/min/1.73   TSH Rfx On Abnormal To Free T4    Collection Time: 11/15/24  5:03 PM    Specimen: Blood   Result Value Ref Range    TSH 2.800 0.270 - 4.200 uIU/mL   Magnesium    Collection Time: 11/15/24  5:03 PM    Specimen: Blood   Result Value Ref Range    Magnesium 2.0 1.6 - 2.6 mg/dL   CBC Auto Differential    Collection Time: 11/15/24  5:03 PM    Specimen: Blood   Result Value Ref Range    WBC 11.22 (H) 3.40 - 10.80 10*3/mm3    RBC 4.73 4.14 - 5.80 10*6/mm3    Hemoglobin 14.2 13.0 - 17.7 g/dL    Hematocrit 42.3 37.5 - 51.0 %    MCV 89.4 79.0 - 97.0 fL    MCH 30.0 26.6 - 33.0 pg    MCHC 33.6 31.5 - 35.7 g/dL    RDW 13.2 12.3 - 15.4 %    RDW-SD 43.3 37.0 - 54.0 fl    MPV 11.1 6.0 - 12.0 fL    Platelets 215 140 - 450 10*3/mm3    Neutrophil % 67.2 42.7 - 76.0 %    Lymphocyte % 14.9 (L) 19.6 - 45.3 %    Monocyte % 14.9 (H) 5.0 - 12.0 %    Eosinophil % 2.1 0.3 - 6.2 %    Basophil % 0.6 0.0 - 1.5 %    Immature Grans % 0.3 0.0 - 0.5 %    Neutrophils, Absolute 7.54 (H) 1.70 - 7.00 10*3/mm3    Lymphocytes, Absolute 1.67 0.70 - 3.10 10*3/mm3    Monocytes, Absolute 1.67 (H) 0.10 - 0.90 10*3/mm3    Eosinophils, Absolute 0.24 0.00 - 0.40 10*3/mm3    Basophils, Absolute 0.07 0.00 - 0.20 10*3/mm3    Immature Grans, Absolute 0.03 0.00 - 0.05 10*3/mm3    nRBC 0.0 0.0 - 0.2 /100 WBC   ECG 12 Lead Tachycardia    Collection Time: 11/15/24  6:15  PM   Result Value Ref Range    QT Interval 431 ms    QTC Interval 567 ms     No radiology results for the last day  Medications   sodium chloride 0.9 % flush 10 mL (has no administration in time range)   dilTIAZem (CARDIZEM) 125 mg in 125 mL D5W infusion (10 mg/hr Intravenous Rate/Dose Change 11/15/24 1812)   dilTIAZem (CARDIZEM) injection 25 mg (has no administration in time range)   Enoxaparin Sodium (LOVENOX) syringe 120 mg (has no administration in time range)   dilTIAZem (CARDIZEM) injection 20 mg (20 mg Intravenous Given 11/15/24 1709)                                            KG #1 my interpretation atrial fibrillation rate of 155 nonspecific diffuse T wave changes noted.  No acute ST elevation QTc of 583 abnormal EKG with nothing old to compare with next line EKG #2 my interpretation atrial fibrillation atrial flutter rate of 100 incomplete right bundle branch block QTc of 567 unchanged early 1 other than a slower rate abnormal EKG            Medical Decision Making  Medical decision making.  Patient IV established monitor placement review atrial fibrillation rate of 160.  EKG my interpretation atrial fibrillation rate of 155 nonspecific T wave changes no acute ST elevation abnormal with nothing old to compare with.  Patient was started on Cardizem initially and 20 mg bolus and a drip at 5 mg an hour.  Then drip was titrated initially came down to a heart rate of 100 but would bounce back up to 120s it was titrated up to 15.  Patient required additional 25 mg bolus IV.  And now heart rate is in the 70s but still atrial fibrillation or atrial flutter.  Labs obtained by independent reviewed basic metabolic profile unremarkable magnesium and CBC unremarkable TSH normal patient just had a chest x-ray on the 13th of this month that was reviewed and there is no active disease noted on that per radiology report.  This was done at the urgent care center.  Denies lungs are clear sats are 97% respiratory of 18 I do  not suspect any pneumonia or failure based on clinical exam.  I do not see evidence that would suggest acute cardiac ischemia DVT pulmonary embolism or dissection myocardial infarction.  No evidence just pericarditis myocarditis or pericardial effusion sepsis or bacteremia.  Patient made aware of the findings.  This may be related to his use recently.  We talked about the findings he is given Lovenox 1 mg kilogram subcutaneously for stroke prophylaxis I talked to the hospitalist we discussed the case.  Patient will be admitted for further care stable unremarkable improved ER course critical care 35 minutes    Problems Addressed:  Atrial fibrillation with rapid ventricular response: complicated acute illness or injury    Amount and/or Complexity of Data Reviewed  External Data Reviewed: radiology.  Labs: ordered. Decision-making details documented in ED Course.  ECG/medicine tests: ordered and independent interpretation performed. Decision-making details documented in ED Course.    Risk  Drug therapy requiring intensive monitoring for toxicity.  Decision regarding hospitalization.        Final diagnoses:   Atrial fibrillation with rapid ventricular response       ED Disposition  ED Disposition       ED Disposition   Decision to Admit    Condition   --    Comment   Level of Care: Telemetry [5]   Admitting Physician: ARNULFO GALLARDO [[id number]]   Attending Physician: ARNULFO GALLARDO [[id number]]   Bed Request Comments: pcu                 No follow-up provider specified.       Medication List      No changes were made to your prescriptions during this visit.            Antonio Mims MD  11/15/24 2254

## 2024-11-16 ENCOUNTER — APPOINTMENT (OUTPATIENT)
Dept: CARDIOLOGY | Facility: HOSPITAL | Age: 59
DRG: 310 | End: 2024-11-16
Payer: COMMERCIAL

## 2024-11-16 LAB
ALBUMIN SERPL-MCNC: 4 G/DL (ref 3.5–5.2)
ALBUMIN/GLOB SERPL: 1.3 G/DL
ALP SERPL-CCNC: 62 U/L (ref 39–117)
ALT SERPL W P-5'-P-CCNC: 12 U/L (ref 1–41)
ANION GAP SERPL CALCULATED.3IONS-SCNC: 10 MMOL/L (ref 5–15)
AORTIC DIMENSIONLESS INDEX: 0.95 (DI)
AST SERPL-CCNC: 19 U/L (ref 1–40)
BH CV ECHO MEAS - AO MAX PG: 5.8 MMHG
BH CV ECHO MEAS - AO MEAN PG: 3 MMHG
BH CV ECHO MEAS - AO V2 MAX: 120 CM/SEC
BH CV ECHO MEAS - AO V2 VTI: 19.4 CM
BH CV ECHO MEAS - AVA(I,D): 2.8 CM2
BH CV ECHO MEAS - EDV(CUBED): 132.7 ML
BH CV ECHO MEAS - EDV(MOD-SP4): 121 ML
BH CV ECHO MEAS - EF(MOD-SP4): 61.8 %
BH CV ECHO MEAS - ESV(CUBED): 32.8 ML
BH CV ECHO MEAS - ESV(MOD-SP4): 46.2 ML
BH CV ECHO MEAS - FS: 37.3 %
BH CV ECHO MEAS - IVS/LVPW: 0.91 CM
BH CV ECHO MEAS - IVSD: 1 CM
BH CV ECHO MEAS - LA DIMENSION: 2.9 CM
BH CV ECHO MEAS - LV DIASTOLIC VOL/BSA (35-75): 50.6 CM2
BH CV ECHO MEAS - LV MASS(C)D: 200.8 GRAMS
BH CV ECHO MEAS - LV MAX PG: 5.2 MMHG
BH CV ECHO MEAS - LV MEAN PG: 2 MMHG
BH CV ECHO MEAS - LV SYSTOLIC VOL/BSA (12-30): 19.3 CM2
BH CV ECHO MEAS - LV V1 MAX: 114 CM/SEC
BH CV ECHO MEAS - LV V1 VTI: 15.6 CM
BH CV ECHO MEAS - LVIDD: 5.1 CM
BH CV ECHO MEAS - LVIDS: 3.2 CM
BH CV ECHO MEAS - LVOT AREA: 3.5 CM2
BH CV ECHO MEAS - LVOT DIAM: 2.1 CM
BH CV ECHO MEAS - LVPWD: 1.1 CM
BH CV ECHO MEAS - PA ACC TIME: 0.13 SEC
BH CV ECHO MEAS - PA V2 MAX: 101 CM/SEC
BH CV ECHO MEAS - RV MAX PG: 2.3 MMHG
BH CV ECHO MEAS - RV V1 MAX: 75.8 CM/SEC
BH CV ECHO MEAS - RV V1 VTI: 12.1 CM
BH CV ECHO MEAS - SV(LVOT): 54 ML
BH CV ECHO MEAS - SV(MOD-SP4): 74.8 ML
BH CV ECHO MEAS - SVI(LVOT): 22.6 ML/M2
BH CV ECHO MEAS - SVI(MOD-SP4): 31.3 ML/M2
BILIRUB SERPL-MCNC: 1.7 MG/DL (ref 0–1.2)
BUN SERPL-MCNC: 12 MG/DL (ref 6–20)
BUN/CREAT SERPL: 11.4 (ref 7–25)
CALCIUM SPEC-SCNC: 9 MG/DL (ref 8.6–10.5)
CHLORIDE SERPL-SCNC: 102 MMOL/L (ref 98–107)
CO2 SERPL-SCNC: 26 MMOL/L (ref 22–29)
CREAT SERPL-MCNC: 1.05 MG/DL (ref 0.76–1.27)
D DIMER PPP FEU-MCNC: 0.58 MCGFEU/ML (ref 0–0.57)
DEPRECATED RDW RBC AUTO: 43.7 FL (ref 37–54)
EGFRCR SERPLBLD CKD-EPI 2021: 82.8 ML/MIN/1.73
ERYTHROCYTE [DISTWIDTH] IN BLOOD BY AUTOMATED COUNT: 13.4 % (ref 12.3–15.4)
GLOBULIN UR ELPH-MCNC: 3 GM/DL
GLUCOSE SERPL-MCNC: 118 MG/DL (ref 65–99)
HCT VFR BLD AUTO: 41.2 % (ref 37.5–51)
HGB BLD-MCNC: 13.6 G/DL (ref 13–17.7)
LV EF 2D ECHO EST: 65 %
MAGNESIUM SERPL-MCNC: 2 MG/DL (ref 1.6–2.6)
MCH RBC QN AUTO: 29.6 PG (ref 26.6–33)
MCHC RBC AUTO-ENTMCNC: 33 G/DL (ref 31.5–35.7)
MCV RBC AUTO: 89.6 FL (ref 79–97)
NT-PROBNP SERPL-MCNC: 434 PG/ML (ref 0–900)
PHOSPHATE SERPL-MCNC: 2.5 MG/DL (ref 2.5–4.5)
PLATELET # BLD AUTO: 202 10*3/MM3 (ref 140–450)
PMV BLD AUTO: 10.9 FL (ref 6–12)
POTASSIUM SERPL-SCNC: 4.3 MMOL/L (ref 3.5–5.2)
PROT SERPL-MCNC: 7 G/DL (ref 6–8.5)
QT INTERVAL: 365 MS
QT INTERVAL: 513 MS
QTC INTERVAL: 583 MS
QTC INTERVAL: 592 MS
RBC # BLD AUTO: 4.6 10*6/MM3 (ref 4.14–5.8)
SINUS: 4.3 CM
SODIUM SERPL-SCNC: 138 MMOL/L (ref 136–145)
STJ: 3.5 CM
WBC NRBC COR # BLD AUTO: 10.06 10*3/MM3 (ref 3.4–10.8)

## 2024-11-16 PROCEDURE — 84100 ASSAY OF PHOSPHORUS: CPT | Performed by: INTERNAL MEDICINE

## 2024-11-16 PROCEDURE — 99204 OFFICE O/P NEW MOD 45 MIN: CPT | Performed by: INTERNAL MEDICINE

## 2024-11-16 PROCEDURE — 85027 COMPLETE CBC AUTOMATED: CPT | Performed by: INTERNAL MEDICINE

## 2024-11-16 PROCEDURE — 93306 TTE W/DOPPLER COMPLETE: CPT | Performed by: INTERNAL MEDICINE

## 2024-11-16 PROCEDURE — 80053 COMPREHEN METABOLIC PANEL: CPT | Performed by: INTERNAL MEDICINE

## 2024-11-16 PROCEDURE — 83735 ASSAY OF MAGNESIUM: CPT | Performed by: INTERNAL MEDICINE

## 2024-11-16 PROCEDURE — 25010000002 SULFUR HEXAFLUORIDE MICROSPH 60.7-25 MG RECONSTITUTED SUSPENSION

## 2024-11-16 PROCEDURE — 83880 ASSAY OF NATRIURETIC PEPTIDE: CPT | Performed by: INTERNAL MEDICINE

## 2024-11-16 PROCEDURE — G0378 HOSPITAL OBSERVATION PER HR: HCPCS

## 2024-11-16 PROCEDURE — 85379 FIBRIN DEGRADATION QUANT: CPT | Performed by: INTERNAL MEDICINE

## 2024-11-16 PROCEDURE — 93306 TTE W/DOPPLER COMPLETE: CPT

## 2024-11-16 RX ORDER — DILTIAZEM HCL 60 MG
60 TABLET ORAL EVERY 8 HOURS SCHEDULED
Status: DISCONTINUED | OUTPATIENT
Start: 2024-11-16 | End: 2024-11-18

## 2024-11-16 RX ORDER — DILTIAZEM HYDROCHLORIDE 5 MG/ML
10 INJECTION INTRAVENOUS ONCE
Status: COMPLETED | OUTPATIENT
Start: 2024-11-17 | End: 2024-11-16

## 2024-11-16 RX ORDER — AMOXICILLIN 875 MG/1
875 TABLET, COATED ORAL EVERY 12 HOURS SCHEDULED
Status: DISCONTINUED | OUTPATIENT
Start: 2024-11-16 | End: 2024-11-19 | Stop reason: HOSPADM

## 2024-11-16 RX ADMIN — APIXABAN 5 MG: 5 TABLET, FILM COATED ORAL at 09:14

## 2024-11-16 RX ADMIN — ACETAMINOPHEN 650 MG: 325 TABLET, FILM COATED ORAL at 21:45

## 2024-11-16 RX ADMIN — DILTIAZEM HYDROCHLORIDE 60 MG: 60 TABLET, FILM COATED ORAL at 21:45

## 2024-11-16 RX ADMIN — GUAIFENESIN 600 MG: 600 TABLET, EXTENDED RELEASE ORAL at 08:59

## 2024-11-16 RX ADMIN — GUAIFENESIN 600 MG: 600 TABLET, EXTENDED RELEASE ORAL at 21:44

## 2024-11-16 RX ADMIN — APIXABAN 5 MG: 5 TABLET, FILM COATED ORAL at 21:44

## 2024-11-16 RX ADMIN — ACETAMINOPHEN 650 MG: 325 TABLET, FILM COATED ORAL at 06:47

## 2024-11-16 RX ADMIN — DILTIAZEM HYDROCHLORIDE 60 MG: 60 TABLET, FILM COATED ORAL at 09:14

## 2024-11-16 RX ADMIN — AMOXICILLIN 875 MG: 875 TABLET, FILM COATED ORAL at 13:22

## 2024-11-16 RX ADMIN — AMOXICILLIN 875 MG: 875 TABLET, FILM COATED ORAL at 21:44

## 2024-11-16 RX ADMIN — Medication 10 ML: at 21:45

## 2024-11-16 RX ADMIN — SULFUR HEXAFLUORIDE 2 ML: KIT at 08:48

## 2024-11-16 RX ADMIN — DILTIAZEM HYDROCHLORIDE 10 MG: 5 INJECTION, SOLUTION INTRAVENOUS at 23:51

## 2024-11-16 RX ADMIN — DRONEDARONE 400 MG: 400 TABLET, FILM COATED ORAL at 10:36

## 2024-11-16 RX ADMIN — Medication 10 ML: at 10:37

## 2024-11-16 RX ADMIN — Medication 10 MG/HR: at 02:00

## 2024-11-16 RX ADMIN — DRONEDARONE 400 MG: 400 TABLET, FILM COATED ORAL at 17:13

## 2024-11-16 NOTE — CONSULTS
CC--- palpitations    Sub  59-year-old male patient complains of mild cough since last few days and with productive phlegm and also noticed increased heart rate with palpitations.  Patient was seen and was found to have atrial arrhythmia and started on intravenous Cardizem and cardiology consultation requested.  No prior history of arrhythmias.  Patient denies any chest pain no dizziness or syncope.  Denies any alcohol use or thyroid abnormalities in the past.  No history of stroke.  He is currently comfortable with intravenous Cardizem and atrial arrhythmia rate is fairly well-controlled.  Patient has prior history of hypertension        Past Medical History:   Diagnosis Date    Hypertension      History reviewed. No pertinent surgical history.  Family History   Problem Relation Age of Onset    Valvular heart disease Mother     Other Mother         pacemaker    Coronary artery disease Father         CABG x 2     Diabetes Sister     Obesity Sister      Social History     Tobacco Use    Smoking status: Never     Passive exposure: Never    Smokeless tobacco: Never   Vaping Use    Vaping status: Never Used   Substance Use Topics    Alcohol use: Yes     Comment: rare    Drug use: No     Medications Prior to Admission   Medication Sig Dispense Refill Last Dose/Taking    acetaminophen (TYLENOL) 500 MG tablet Take 1 tablet by mouth Every 6 (Six) Hours As Needed for Mild Pain.   11/15/2024 at 11:00 AM    losartan-hydrochlorothiazide (HYZAAR) 50-12.5 MG per tablet TAKE 1 TABLET DAILY 90 tablet 3 11/15/2024 at 10:00 AM    albuterol sulfate  (90 Base) MCG/ACT inhaler Inhale 2 puffs Every 4 (Four) Hours As Needed for Wheezing or Shortness of Air. (Patient not taking: Reported on 11/15/2024) 8 g 0 Not Taking    benzonatate (TESSALON) 200 MG capsule Take 1 capsule by mouth 3 (Three) Times a Day As Needed for Cough. (Patient not taking: Reported on 11/15/2024) 30 capsule 0     predniSONE (DELTASONE) 10 MG (21) dose pack  Take  by mouth Daily for 6 days. Use as directed on package (Patient not taking: Reported on 11/15/2024) 21 tablet 0      Allergies:  Patient has no known allergies.    Review of Systems   General:  positive for fatigue and tiredness  Eyes: No redness  Cardiovascular: No chest pain      Physical Exam    General:      well developed, well nourished, in no acute distress.    Head:      normocephalic and atraumatic.    Eyes:      PERRL/EOM intact, conjunctivae and sclerae clear without nystagmus.    Neck:      no  thyromegaly, trachea central with normal respiratory effort  Lungs:      clear bilaterally to auscultation.    Heart:       irregular rate and rhythm, S1, S2 without murmurs, rubs, or gallops  Skin:      intact without lesions or rashes.    Psych:      alert and cooperative; normal mood and affect; normal attention span and concentration.            CBC    Results from last 7 days   Lab Units 11/16/24  0554 11/15/24  1703   WBC 10*3/mm3 10.06 11.22*   HEMOGLOBIN g/dL 13.6 14.2   PLATELETS 10*3/mm3 202 215     BMP   Results from last 7 days   Lab Units 11/16/24  0554 11/15/24  1703   SODIUM mmol/L 138 138   POTASSIUM mmol/L 4.3 3.9   CHLORIDE mmol/L 102 101   CO2 mmol/L 26.0 25.7   BUN mg/dL 12 11   CREATININE mg/dL 1.05 1.15   GLUCOSE mg/dL 118* 103*   MAGNESIUM mg/dL 2.0 2.0   PHOSPHORUS mg/dL 2.5  --      CMP   Results from last 7 days   Lab Units 11/16/24  0554 11/15/24  1703   SODIUM mmol/L 138 138   POTASSIUM mmol/L 4.3 3.9   CHLORIDE mmol/L 102 101   CO2 mmol/L 26.0 25.7   BUN mg/dL 12 11   CREATININE mg/dL 1.05 1.15   GLUCOSE mg/dL 118* 103*   ALBUMIN g/dL 4.0  --    BILIRUBIN mg/dL 1.7*  --    ALK PHOS U/L 62  --    AST (SGOT) U/L 19  --    ALT (SGPT) U/L 12  --          Assessment and plan    New onset classical atrial flutter  Check echocardiography  Check D-dimer, BNP  Intravenous Cardizem  Start oral Cardizem  Start oral dronedarone   Options for recurrent atrial flutter like medical treatment  need for ablation in future discussed with the patient  Stop Lovenox  Start Eliquis  Patient has symptoms of bronchitis to be evaluated by hospitalist    TSH is normal    Electronically signed by Angelito Garcia MD, 11/16/24, 8:16 AM EST.

## 2024-11-16 NOTE — PROGRESS NOTES
.    [facility] MEDICINE SERVICE  DAILY PROGRESS NOTE    NAME: Kenny Cruz  : 1965  MRN: [id number]      LOS: 0 days     PROVIDER OF SERVICE: Manju Graff MD    Chief Complaint: Atrial fibrillation with RVR    Subjective:     Interval History:  History taken from: patient    Seen and examined at bedside, resting comfortably with wife and daughter.  Discussed pathophysiology of A-fib at great length.  All question concerns addressed.        Review of Systems:   Review of Systems negative except as mentioned above    Objective:     Vital Signs  Temp:  [98.3 °F (36.8 °C)-99.3 °F (37.4 °C)] 99.3 °F (37.4 °C)  Heart Rate:  [] 107  Resp:  [18-20] 18  BP: (104-137)/(64-88) 111/72   Body mass index is 35.58 kg/m².    Physical Exam  Physical Exam  General: Awake, alert, NAD  HEENT: NC/AT, PERRL, EOMI, conjunctivae are clear, mucous membrane moist, oropharynx clear, Trachea midline   Cardiovascular: Regular rate and irregularly irregular rhythm, no murmurs  Respiratory: Clear to auscultation bilaterally, no wheezing or rales, unlabored breathing  Abdomen: Soft, nontender, positive bowel sounds, no guarding  Neurologic: A&O, CN grossly intact, moves all extremities spontaneously  Musculoskeletal: Normal range of motion, no other gross deformities  Skin: Warm, dry        Diagnostic Data    Results from last 7 days   Lab Units 24  0554   WBC 10*3/mm3 10.06   HEMOGLOBIN g/dL 13.6   HEMATOCRIT % 41.2   PLATELETS 10*3/mm3 202   GLUCOSE mg/dL 118*   CREATININE mg/dL 1.05   BUN mg/dL 12   SODIUM mmol/L 138   POTASSIUM mmol/L 4.3   AST (SGOT) U/L 19   ALT (SGPT) U/L 12   ALK PHOS U/L 62   BILIRUBIN mg/dL 1.7*   ANION GAP mmol/L 10.0       No radiology results for the last day      I reviewed the patient's new clinical results.    Assessment/Plan:     Active and Resolved Problems  Active Hospital Problems    Diagnosis  POA    **Atrial fibrillation with RVR [I48.91]  Yes      Resolved Hospital Problems   No  resolved problems to display.       New onset A-fib RVR  -Heart rate in the 150s initially, improved with Cardizem drip, continue to wean down as tolerated  -Lovenox twice daily for anticoagulation for now   -echo ordered  -BP cardiology consulted, appreciate involvement/input.     Hypertension   -controlled currently, hold home losartan/HCTZ  -Monitor, resume when able     Bronchitis, improved   -recently diagnosed, seems to be doing well, chest x-ray clear, no significant wheezing noted on exam   -Continue symptomatic treatment, DuoNebs as needed       VTE Prophylaxis:  Pharmacologic VTE prophylaxis orders are present.             Disposition Planning:     Barriers to Discharge: Ongoing care  Anticipated Date of Discharge: Pending clinical course  Place of Discharge: Home      Time: > 50 minutes     Code Status and Medical Interventions: CPR (Attempt to Resuscitate); Full Support   Ordered at: 11/15/24 1925     Code Status (Patient has no pulse and is not breathing):    CPR (Attempt to Resuscitate)     Medical Interventions (Patient has pulse or is breathing):    Full Support       Signature: Electronically signed by Manju Graff MD, 11/16/24, 12:25 [facility].  [facility] Hospitalist Team

## 2024-11-16 NOTE — PROGRESS NOTES
[facility] MEDICINE SERVICE  DAILY PROGRESS NOTE    NAME: Kenny Cruz  : 1965  MRN: [id number]      LOS: 0 days     PROVIDER OF SERVICE: Suzan Beyer MD    Chief Complaint: Atrial fibrillation with RVR    Subjective:     Interval History:    Patient seen and evaluated at bedside.   H&P, consults, labs and imaging reviewed patient went to see urgent care provider for acute bronchitis but did not get his antibiotic filled.  Still complaining of cough with sputum production.  Treatment plan discussed with patient. All questions addressed.     Review of Systems:   All 21 ROS were negative except mentioned above.    Objective:     Vital Signs  Temp:  [98.3 °F (36.8 °C)-99.3 °F (37.4 °C)] 99.3 °F (37.4 °C)  Heart Rate:  [] 107  Resp:  [18-20] 18  BP: (104-137)/(64-88) 111/72   Body mass index is 35.58 kg/m².    Physical Exam   General: No acute distress, appears stated age  Neuro: Awake and alert, oriented x3, no focal deficits appreciated  Head: atraumatic, normocephalic  HEENT: EOMI, anicteric, normal sclera and conjunctivae, moist mucus membranes  Neck: supple, no lymphadenopathy  CV: IRR R tachycardia, soft heart sounds, no murmurs appreciated, no peripheral edema  Pulm: Decreased breath sounds, no increased work of breathing, no adventitious sounds  Abd: Soft, nontender, nondistended  Skin: Warm, dry and intact  Psych: Appropriate mood and affect    Scheduled Meds   apixaban, 5 mg, Oral, Q12H  dilTIAZem, 60 mg, Oral, Q8H  dronedarone, 400 mg, Oral, BID With Meals  guaiFENesin, 600 mg, Oral, Q12H  sodium chloride, 10 mL, Intravenous, Q12H       PRN Meds     acetaminophen **OR** acetaminophen **OR** acetaminophen    senna-docusate sodium **AND** polyethylene glycol **AND** bisacodyl **AND** bisacodyl    Calcium Replacement - Follow Nurse / BPA Driven Protocol    influenza vaccine    ipratropium-albuterol    Magnesium Cardiology Dose Replacement - Follow Nurse / BPA Driven Protocol     nitroglycerin    ondansetron ODT **OR** ondansetron    Phosphorus Replacement - Follow Nurse / BPA Driven Protocol    Potassium Replacement - Follow Nurse / BPA Driven Protocol    [COMPLETED] Insert Peripheral IV **AND** sodium chloride    sodium chloride    sodium chloride   Infusions         Diagnostic Data    Results from last 7 days   Lab Units 11/16/24  0554   WBC 10*3/mm3 10.06   HEMOGLOBIN g/dL 13.6   HEMATOCRIT % 41.2   PLATELETS 10*3/mm3 202   GLUCOSE mg/dL 118*   CREATININE mg/dL 1.05   BUN mg/dL 12   SODIUM mmol/L 138   POTASSIUM mmol/L 4.3   AST (SGOT) U/L 19   ALT (SGPT) U/L 12   ALK PHOS U/L 62   BILIRUBIN mg/dL 1.7*   ANION GAP mmol/L 10.0       No radiology results for the last day    Interval results reviewed.    Assessment/Plan:     New onset atrial flutter  Acute bronchitis  Hypertension    Cardiology following the patient, 2D echo pending.  Labs pending  Patient on Cardizem IV started on oral calcium channel blocker and donator on by cardiology.  Cardiology also started patient on Eliquis.    Continue Cardizem for blood pressure.    Will start patient on Amoxil 875 twice daily for bronchitis.      Treatment plan discussed with RN.     VTE Prophylaxis:  Pharmacologic VTE prophylaxis orders are present.         Code status is   Code Status and Medical Interventions: CPR (Attempt to Resuscitate); Full Support   Ordered at: 11/15/24 1925     Code Status (Patient has no pulse and is not breathing):    CPR (Attempt to Resuscitate)     Medical Interventions (Patient has pulse or is breathing):    Full Support       Plan for disposition:     Barriers to Discharge: Atrial fibrillation with RVR  Anticipated Date of Discharge: 11/17/2024  Place of Discharge: Home      Time: 40 minutes     Signature: Electronically signed by Suzan Beyer MD, 11/16/24, 11:40 EST.  [facility] Hospitalist Team

## 2024-11-16 NOTE — PLAN OF CARE
Goal Outcome Evaluation:      Pt resting comfortably HR has decreased to 95- 110's  but still remains in Aflutter, cardizem gtt stop and Po started. D-Dimer 0.58 VSS, plan of care ongoing

## 2024-11-16 NOTE — H&P
Hospitalist Service  History and Physical      Patient Name: Kenny Cruz  : 1965  MRN: [id number]  Primary Care Physician:  Cherelle Bernabe APRN  Date of admission: 11/15/2024      Subjective      Chief Complaint: Rapid heartbeat    History of Present Illness: Kenny Cruz is a 59 y.o. male with past medical history of hypertension who presented to [facility] on 11/15/2024 from urgent care with complaints of rapid heartbeat.  Patient states she has been dealing with cough and congestion recently and went to urgent care and was diagnosed with bronchitis. He was taking TheraFlu he took some last night today he noticed his heart was running fast. He had with the lightheadedness and dizziness. He went to urgent care and he was referred here for heart rate that is running in the 150s. He denies any chest pain neck arm jaw pain or shortness of breath.  Noted to be in A-fib RVR, new onset.  Started on Cardizem drip.      ROS: Pertinent positives as noted in HPI/subjective.  All other systems were reviewed and are negative.      Personal History     Past Medical History:   Diagnosis Date    Hypertension        No past surgical history on file.    Family History: family history includes Coronary artery disease in his father; Diabetes in his sister; Obesity in his sister; Other in his mother; Valvular heart disease in his mother. Otherwise pertinent FHx was reviewed and not pertinent to current issue.    Social History:  reports that he has never smoked. He has never used smokeless tobacco. He reports current alcohol use. He reports that he does not use drugs.    Home Medications:  Prior to Admission Medications       Prescriptions Last Dose Informant Patient Reported? Taking?    acetaminophen (TYLENOL) 500 MG tablet 11/15/2024  Yes Yes    Take 1 tablet by mouth Every 6 (Six) Hours As Needed for Mild Pain.    losartan-hydrochlorothiazide (HYZAAR) 50-12.5 MG per tablet 11/15/2024  No Yes    TAKE 1  TABLET DAILY    albuterol sulfate  (90 Base) MCG/ACT inhaler Not Taking  No No    Inhale 2 puffs Every 4 (Four) Hours As Needed for Wheezing or Shortness of Air.    Patient not taking:  Reported on 11/15/2024    benzonatate (TESSALON) 200 MG capsule   No No    Take 1 capsule by mouth 3 (Three) Times a Day As Needed for Cough.    Patient not taking:  Reported on 11/15/2024    predniSONE (DELTASONE) 10 MG (21) dose pack   No No    Take  by mouth Daily for 6 days. Use as directed on package    Patient not taking:  Reported on 11/15/2024              I have utilized all available, immediate resources to obtain, update, or review the patient's current medications including all prescriptions, over-the-counter products, herbals, cannabis/cannabidiol products, and vitamin.mineral/dietary (nutritional) supplements.    Allergies:  No Known Allergies    Objective      Vitals:   Temp:  [98.8 °F (37.1 °C)] 98.8 °F (37.1 °C)  Heart Rate:  [] 92  Resp:  [18-20] 18  BP: (104-137)/(64-88) 132/88    Physical Exam:    General: Awake, alert, NAD  HEENT: NC/AT, PERRL, EOMI, conjunctivae are clear, mucous membrane moist, oropharynx clear, Trachea midline   Cardiovascular: Regular rate and irregularly irregular rhythm, no murmurs  Respiratory: Clear to auscultation bilaterally, no wheezing or rales, unlabored breathing  Abdomen: Soft, nontender, positive bowel sounds, no guarding  Neurologic: A&O, CN grossly intact, moves all extremities spontaneously  Musculoskeletal: Normal range of motion, no other gross deformities  Skin: Warm, dry      Result Review    Result Review:  I have personally reviewed the results from the time of this admission to 11/15/2024 21:08 EST and agree with these findings:  [x]  Laboratory  [x]  Microbiology  [x]  Radiology  [x]  EKG/Telemetry   [x]  Cardiology/Vascular   []  Pathology  [x]  Old records  []  Other:      Assessment & Plan        Active Hospital Problems:  Active Hospital Problems     Diagnosis     **Atrial fibrillation with RVR        Assessment/Plan:     New onset A-fib RVR  -Heart rate in the 150s initially, improved with Cardizem drip, continue to wean down as tolerated  -Lovenox twice daily for anticoagulation for now   -echo ordered  -Cardiology consult    Hypertension   -controlled currently, hold home losartan/HCTZ  -Monitor, resume when able    Bronchitis, improved   -recently diagnosed, seems to be doing well, chest x-ray clear, no significant wheezing noted on exam   -Continue symptomatic treatment, DuoNebs as needed    VTE Prophylaxis:  Pharmacologic VTE prophylaxis orders are present.        CODE STATUS:    Code Status (Patient has no pulse and is not breathing): CPR (Attempt to Resuscitate)  Medical Interventions (Patient has pulse or is breathing): Full Support      Admission Status:  I believe this patient meets obs status.    Signature:   Electronically signed by Brendon Cho DO, 11/15/24, 9:04 PM EST.    Part of this note may be an electronic transcription/translation of spoken language to printed text using the Dragon Dictation System.

## 2024-11-16 NOTE — PLAN OF CARE
"Goal Outcome Evaluation:            Patient admitted to PCU from ED. Patient arrived on caridzem gtt at 15mg/hr. Patient has no complaints of chest pain and or rapid heart rate at this time, patient's heart rate is currently in the 80's however remains in afib. Patient's vital signs are otherwise within defined limits. Patient does state he was recently diagnosed with bronchitis at an immediate care center however was not able to  his prescriptions yet. Patient presents with a cough, non productive in nature at this time. Patient denies shortness of air. Patient presents with yellow/green drainage from his right eye, denies pain or discomfort other than stating \"it feels like I have an eyelash in my eye.\" Call light in reach. Will continue monitoring.                                 "

## 2024-11-17 PROBLEM — I48.92 ATRIAL FLUTTER: Status: ACTIVE | Noted: 2024-11-17

## 2024-11-17 LAB
ANION GAP SERPL CALCULATED.3IONS-SCNC: 9.1 MMOL/L (ref 5–15)
B PARAPERT DNA SPEC QL NAA+PROBE: NOT DETECTED
B PERT DNA SPEC QL NAA+PROBE: NOT DETECTED
BASOPHILS # BLD AUTO: 0.04 10*3/MM3 (ref 0–0.2)
BASOPHILS NFR BLD AUTO: 0.4 % (ref 0–1.5)
BUN SERPL-MCNC: 14 MG/DL (ref 6–20)
BUN/CREAT SERPL: 12.5 (ref 7–25)
C PNEUM DNA NPH QL NAA+NON-PROBE: NOT DETECTED
CALCIUM SPEC-SCNC: 8.5 MG/DL (ref 8.6–10.5)
CHLORIDE SERPL-SCNC: 99 MMOL/L (ref 98–107)
CO2 SERPL-SCNC: 26.9 MMOL/L (ref 22–29)
CREAT SERPL-MCNC: 1.12 MG/DL (ref 0.76–1.27)
DEPRECATED RDW RBC AUTO: 43.5 FL (ref 37–54)
EGFRCR SERPLBLD CKD-EPI 2021: 76.6 ML/MIN/1.73
EOSINOPHIL # BLD AUTO: 0.09 10*3/MM3 (ref 0–0.4)
EOSINOPHIL NFR BLD AUTO: 0.9 % (ref 0.3–6.2)
ERYTHROCYTE [DISTWIDTH] IN BLOOD BY AUTOMATED COUNT: 13.2 % (ref 12.3–15.4)
FLUAV SUBTYP SPEC NAA+PROBE: NOT DETECTED
FLUBV RNA ISLT QL NAA+PROBE: NOT DETECTED
GLUCOSE SERPL-MCNC: 113 MG/DL (ref 65–99)
HADV DNA SPEC NAA+PROBE: NOT DETECTED
HCOV 229E RNA SPEC QL NAA+PROBE: NOT DETECTED
HCOV HKU1 RNA SPEC QL NAA+PROBE: NOT DETECTED
HCOV NL63 RNA SPEC QL NAA+PROBE: NOT DETECTED
HCOV OC43 RNA SPEC QL NAA+PROBE: NOT DETECTED
HCT VFR BLD AUTO: 37.9 % (ref 37.5–51)
HGB BLD-MCNC: 12.8 G/DL (ref 13–17.7)
HMPV RNA NPH QL NAA+NON-PROBE: NOT DETECTED
HPIV1 RNA ISLT QL NAA+PROBE: NOT DETECTED
HPIV2 RNA SPEC QL NAA+PROBE: NOT DETECTED
HPIV3 RNA NPH QL NAA+PROBE: NOT DETECTED
HPIV4 P GENE NPH QL NAA+PROBE: NOT DETECTED
IMM GRANULOCYTES # BLD AUTO: 0.04 10*3/MM3 (ref 0–0.05)
IMM GRANULOCYTES NFR BLD AUTO: 0.4 % (ref 0–0.5)
LYMPHOCYTES # BLD AUTO: 1.41 10*3/MM3 (ref 0.7–3.1)
LYMPHOCYTES NFR BLD AUTO: 13.9 % (ref 19.6–45.3)
M PNEUMO IGG SER IA-ACNC: NOT DETECTED
MCH RBC QN AUTO: 30.2 PG (ref 26.6–33)
MCHC RBC AUTO-ENTMCNC: 33.8 G/DL (ref 31.5–35.7)
MCV RBC AUTO: 89.4 FL (ref 79–97)
MONOCYTES # BLD AUTO: 1.43 10*3/MM3 (ref 0.1–0.9)
MONOCYTES NFR BLD AUTO: 14.1 % (ref 5–12)
NEUTROPHILS NFR BLD AUTO: 7.14 10*3/MM3 (ref 1.7–7)
NEUTROPHILS NFR BLD AUTO: 70.3 % (ref 42.7–76)
NRBC BLD AUTO-RTO: 0 /100 WBC (ref 0–0.2)
PLATELET # BLD AUTO: 202 10*3/MM3 (ref 140–450)
PMV BLD AUTO: 10.9 FL (ref 6–12)
POTASSIUM SERPL-SCNC: 3.9 MMOL/L (ref 3.5–5.2)
QT INTERVAL: 400 MS
QTC INTERVAL: 568 MS
RBC # BLD AUTO: 4.24 10*6/MM3 (ref 4.14–5.8)
RHINOVIRUS RNA SPEC NAA+PROBE: NOT DETECTED
RSV RNA NPH QL NAA+NON-PROBE: NOT DETECTED
SARS-COV-2 RNA NPH QL NAA+NON-PROBE: NOT DETECTED
SODIUM SERPL-SCNC: 135 MMOL/L (ref 136–145)
WBC NRBC COR # BLD AUTO: 10.15 10*3/MM3 (ref 3.4–10.8)

## 2024-11-17 PROCEDURE — 94761 N-INVAS EAR/PLS OXIMETRY MLT: CPT

## 2024-11-17 PROCEDURE — 94799 UNLISTED PULMONARY SVC/PX: CPT

## 2024-11-17 PROCEDURE — 0202U NFCT DS 22 TRGT SARS-COV-2: CPT | Performed by: INTERNAL MEDICINE

## 2024-11-17 PROCEDURE — 93005 ELECTROCARDIOGRAM TRACING: CPT | Performed by: INTERNAL MEDICINE

## 2024-11-17 PROCEDURE — 99232 SBSQ HOSP IP/OBS MODERATE 35: CPT | Performed by: INTERNAL MEDICINE

## 2024-11-17 PROCEDURE — 80048 BASIC METABOLIC PNL TOTAL CA: CPT | Performed by: INTERNAL MEDICINE

## 2024-11-17 PROCEDURE — 63710000001 PREDNISONE PER 1 MG: Performed by: INTERNAL MEDICINE

## 2024-11-17 PROCEDURE — 94640 AIRWAY INHALATION TREATMENT: CPT

## 2024-11-17 PROCEDURE — 94664 DEMO&/EVAL PT USE INHALER: CPT

## 2024-11-17 PROCEDURE — 63710000001 PREDNISONE PER 5 MG: Performed by: INTERNAL MEDICINE

## 2024-11-17 PROCEDURE — 85025 COMPLETE CBC W/AUTO DIFF WBC: CPT | Performed by: INTERNAL MEDICINE

## 2024-11-17 RX ORDER — CODEINE PHOSPHATE/GUAIFENESIN 10-100MG/5
5 LIQUID (ML) ORAL EVERY 4 HOURS PRN
Status: DISCONTINUED | OUTPATIENT
Start: 2024-11-17 | End: 2024-11-19 | Stop reason: HOSPADM

## 2024-11-17 RX ORDER — GENTAMICIN SULFATE 3 MG/ML
2 SOLUTION/ DROPS OPHTHALMIC 4 TIMES DAILY
Status: DISCONTINUED | OUTPATIENT
Start: 2024-11-17 | End: 2024-11-19 | Stop reason: HOSPADM

## 2024-11-17 RX ORDER — BUDESONIDE AND FORMOTEROL FUMARATE DIHYDRATE 160; 4.5 UG/1; UG/1
2 AEROSOL RESPIRATORY (INHALATION)
Status: DISCONTINUED | OUTPATIENT
Start: 2024-11-17 | End: 2024-11-19 | Stop reason: HOSPADM

## 2024-11-17 RX ORDER — PREDNISONE 10 MG/1
10 TABLET ORAL DAILY
Status: DISCONTINUED | OUTPATIENT
Start: 2024-11-21 | End: 2024-11-19 | Stop reason: HOSPADM

## 2024-11-17 RX ORDER — PREDNISONE 20 MG/1
20 TABLET ORAL DAILY
Status: DISCONTINUED | OUTPATIENT
Start: 2024-11-19 | End: 2024-11-19 | Stop reason: HOSPADM

## 2024-11-17 RX ORDER — IPRATROPIUM BROMIDE AND ALBUTEROL SULFATE 2.5; .5 MG/3ML; MG/3ML
3 SOLUTION RESPIRATORY (INHALATION)
Status: DISCONTINUED | OUTPATIENT
Start: 2024-11-17 | End: 2024-11-19 | Stop reason: HOSPADM

## 2024-11-17 RX ADMIN — IPRATROPIUM BROMIDE AND ALBUTEROL SULFATE 3 ML: .5; 3 SOLUTION RESPIRATORY (INHALATION) at 20:26

## 2024-11-17 RX ADMIN — DILTIAZEM HYDROCHLORIDE 60 MG: 60 TABLET, FILM COATED ORAL at 13:10

## 2024-11-17 RX ADMIN — Medication 10 ML: at 20:41

## 2024-11-17 RX ADMIN — AMOXICILLIN 875 MG: 875 TABLET, FILM COATED ORAL at 20:41

## 2024-11-17 RX ADMIN — ACETAMINOPHEN 650 MG: 325 TABLET, FILM COATED ORAL at 06:13

## 2024-11-17 RX ADMIN — DRONEDARONE 400 MG: 400 TABLET, FILM COATED ORAL at 09:54

## 2024-11-17 RX ADMIN — GUAIFENESIN 600 MG: 600 TABLET, EXTENDED RELEASE ORAL at 20:41

## 2024-11-17 RX ADMIN — DILTIAZEM HYDROCHLORIDE 60 MG: 60 TABLET, FILM COATED ORAL at 06:13

## 2024-11-17 RX ADMIN — BUDESONIDE AND FORMOTEROL FUMARATE DIHYDRATE 2 PUFF: 160; 4.5 AEROSOL RESPIRATORY (INHALATION) at 20:32

## 2024-11-17 RX ADMIN — GENTAMICIN SULFATE 2 DROP: 3 SOLUTION OPHTHALMIC at 20:41

## 2024-11-17 RX ADMIN — PREDNISONE 30 MG: 20 TABLET ORAL at 11:36

## 2024-11-17 RX ADMIN — Medication 10 ML: at 09:55

## 2024-11-17 RX ADMIN — GENTAMICIN SULFATE 2 DROP: 3 SOLUTION OPHTHALMIC at 15:43

## 2024-11-17 RX ADMIN — DRONEDARONE 400 MG: 400 TABLET, FILM COATED ORAL at 18:05

## 2024-11-17 RX ADMIN — APIXABAN 5 MG: 5 TABLET, FILM COATED ORAL at 20:41

## 2024-11-17 RX ADMIN — APIXABAN 5 MG: 5 TABLET, FILM COATED ORAL at 09:54

## 2024-11-17 RX ADMIN — GENTAMICIN SULFATE 2 DROP: 3 SOLUTION OPHTHALMIC at 18:06

## 2024-11-17 RX ADMIN — GUAIFENESIN 600 MG: 600 TABLET, EXTENDED RELEASE ORAL at 09:54

## 2024-11-17 RX ADMIN — DILTIAZEM HYDROCHLORIDE 60 MG: 60 TABLET, FILM COATED ORAL at 20:41

## 2024-11-17 RX ADMIN — AMOXICILLIN 875 MG: 875 TABLET, FILM COATED ORAL at 09:54

## 2024-11-17 RX ADMIN — ACETAMINOPHEN 650 MG: 325 TABLET, FILM COATED ORAL at 13:10

## 2024-11-17 RX ADMIN — IPRATROPIUM BROMIDE AND ALBUTEROL SULFATE 3 ML: .5; 3 SOLUTION RESPIRATORY (INHALATION) at 15:02

## 2024-11-17 NOTE — PROGRESS NOTES
[facility] MEDICINE SERVICE  DAILY PROGRESS NOTE    NAME: Kenny Cruz  : 1965  MRN: [id number]      LOS: 0 days     PROVIDER OF SERVICE: Suzan Beyer MD    Chief Complaint: Atrial fibrillation with RVR    Subjective:     Interval History:    Patient seen and evaluated at bedside.   Cough about the same, denies shortness of air nausea vomiting chest pain.  Treatment plan discussed with patient. All questions addressed.     Review of Systems:   All 21 ROS were negative except mentioned above.    Objective:     Vital Signs  Temp:  [98.2 °F (36.8 °C)-99.5 °F (37.5 °C)] 98.8 °F (37.1 °C)  Heart Rate:  [] 114  Resp:  [14-27] 14  BP: (105-133)/(60-77) 116/71   Body mass index is 35.58 kg/m².    Physical Exam   General: No acute distress, appears stated age  Neuro: Awake and alert, oriented x3, no focal deficits appreciated  Head: atraumatic, normocephalic  HEENT: EOMI, anicteric, erythematous right conjunctiva   moist mucus membranes  Neck: supple, no lymphadenopathy  CV: irr R, RR, soft heart sounds, no murmurs appreciated, no peripheral edema  Pulm: Decreased breath sounds, no increased work of breathing, no adventitious sounds  Abd: Soft, nontender, nondistended  Skin: Warm, dry and intact  Psych: Appropriate mood and affect    Scheduled Meds   amoxicillin, 875 mg, Oral, Q12H  apixaban, 5 mg, Oral, Q12H  budesonide-formoterol, 2 puff, Inhalation, BID - RT  dilTIAZem, 60 mg, Oral, Q8H  dronedarone, 400 mg, Oral, BID With Meals  guaiFENesin, 600 mg, Oral, Q12H  predniSONE, 30 mg, Oral, Daily   Followed by  [START ON 2024] predniSONE, 20 mg, Oral, Daily   Followed by  [START ON 2024] predniSONE, 10 mg, Oral, Daily  sodium chloride, 10 mL, Intravenous, Q12H       PRN Meds     acetaminophen **OR** acetaminophen **OR** acetaminophen    senna-docusate sodium **AND** polyethylene glycol **AND** bisacodyl **AND** bisacodyl    Calcium Replacement - Follow Nurse / BPA Driven Protocol     guaiFENesin-codeine    influenza vaccine    ipratropium-albuterol    Magnesium Cardiology Dose Replacement - Follow Nurse / BPA Driven Protocol    nitroglycerin    ondansetron ODT **OR** ondansetron    Phosphorus Replacement - Follow Nurse / BPA Driven Protocol    Potassium Replacement - Follow Nurse / BPA Driven Protocol    [COMPLETED] Insert Peripheral IV **AND** sodium chloride    sodium chloride    sodium chloride   Infusions         Diagnostic Data    Results from last 7 days   Lab Units 11/17/24  1003 11/16/24  0554   WBC 10*3/mm3 10.15 10.06   HEMOGLOBIN g/dL 12.8* 13.6   HEMATOCRIT % 37.9 41.2   PLATELETS 10*3/mm3 202 202   GLUCOSE mg/dL 113* 118*   CREATININE mg/dL 1.12 1.05   BUN mg/dL 14 12   SODIUM mmol/L 135* 138   POTASSIUM mmol/L 3.9 4.3   AST (SGOT) U/L  --  19   ALT (SGPT) U/L  --  12   ALK PHOS U/L  --  62   BILIRUBIN mg/dL  --  1.7*   ANION GAP mmol/L 9.1 10.0       No radiology results for the last day    Interval results reviewed.    Assessment/Plan:   New onset atrial flutter  Acute bronchitis  Acute right conjunctivitis  Hypertension     Cardiology following the patient, 2D echo reviewed.  Patient on oral calcium channel blocker and Multaq by cardiology.  Cardiology also started patient on Eliquis.   pt will go guided cardioversion for symptomatic relief.    Continue Cardizem for blood pressure.     Continue patient on Amoxil 875 twice daily for bronchitis.     Add gentamicin 2 drops to right eye 4 times daily for conjunctivitis        Treatment plan discussed with RN.     VTE Prophylaxis:  Pharmacologic VTE prophylaxis orders are present.         Code status is   Code Status and Medical Interventions: CPR (Attempt to Resuscitate); Full Support   Ordered at: 11/15/24 1925     Code Status (Patient has no pulse and is not breathing):    CPR (Attempt to Resuscitate)     Medical Interventions (Patient has pulse or is breathing):    Full Support       Plan for disposition:     Barriers to  Discharge: Cardioversion  Anticipated Date of Discharge: 11/19/2024  Place of Discharge: Home      Time: 40 minutes     Signature: Electronically signed by Suzan Beyer MD, 11/17/24, 12:43 EST.  [facility] Hospitalist Team

## 2024-11-17 NOTE — PROGRESS NOTES
CC--- bronchitis and atrial flutter    Sub    Patient continues to be in atrial flutter despite starting Multaq  without symptoms  Currently has a pinkeye on the right side          Past Medical History:   Diagnosis Date    Hypertension      History reviewed. No pertinent surgical history.  Family History   Problem Relation Age of Onset    Valvular heart disease Mother     Other Mother         pacemaker    Coronary artery disease Father         CABG x 2     Diabetes Sister     Obesity Sister        Physical Exam    General:      well developed, well nourished, in no acute distress.    Head:      normocephalic and atraumatic.    Eyes:      PERRL/EOM intact, conjunctivae and sclerae clear without nystagmus.    Neck:      no  thyromegaly, trachea central with normal respiratory effort  Lungs:      clear bilaterally to auscultation.    Heart:       irregular rate and rhythm, S1, S2 without murmurs, rubs, or gallops  Skin:      intact without lesions or rashes.    Psych:      alert and cooperative; normal mood and affect; normal attention span and concentration.        CBC    Results from last 7 days   Lab Units 11/17/24  1003 11/16/24  0554 11/15/24  1703   WBC 10*3/mm3 10.15 10.06 11.22*   HEMOGLOBIN g/dL 12.8* 13.6 14.2   PLATELETS 10*3/mm3 202 202 215     BMP   Results from last 7 days   Lab Units 11/17/24  1003 11/16/24  0554 11/15/24  1703   SODIUM mmol/L 135* 138 138   POTASSIUM mmol/L 3.9 4.3 3.9   CHLORIDE mmol/L 99 102 101   CO2 mmol/L 26.9 26.0 25.7   BUN mg/dL 14 12 11   CREATININE mg/dL 1.12 1.05 1.15   GLUCOSE mg/dL 113* 118* 103*   MAGNESIUM mg/dL  --  2.0 2.0   PHOSPHORUS mg/dL  --  2.5  --          Assessment plan    Ongoing atrial flutter an ideal solution will be flutter ablation--- started on Multaq and continues to be in flutter with intermittent rapid heart rates without symptoms and has normal EF  Since patient is in bronchitis and also a pinkeye on the right side--- I would recommend that he had a  DELANEY guided cardioversion for symptomatic relief if there is no pharmacological conversion  Patient likely end up needing flutter ablation in future  Discussed with the patient  I also discussed with Dr. De Leon to do the procedure since I do not have opening tomorrow      Electronically signed by Angelito Garcia MD, 11/17/24, 11:46 AM EST.

## 2024-11-17 NOTE — PROGRESS NOTES
".    [facility] MEDICINE SERVICE  DAILY PROGRESS NOTE    NAME: Kenny Cruz  : 1965  MRN: [id number]      LOS: 0 days     PROVIDER OF SERVICE: Rashid Joyner MD    Chief Complaint: Atrial fibrillation with RVR    Subjective:     History of Present Illness: Kenny Cruz is a 59 y.o. male with past medical history of hypertension who presented to [facility] on 11/15/2024 from urgent care with complaints of rapid heartbeat.  Patient states she has been dealing with cough and congestion recently and went to urgent care and was diagnosed with bronchitis. He was taking TheraFlu he took some last night today he noticed his heart was running fast. He had with the lightheadedness and dizziness. He went to urgent care and he was referred here for heart rate that is running in the 150s. He denies any chest pain neck arm jaw pain or shortness of breath.  Noted to be in A-fib RVR, new onset.  Started on Cardizem drip.     Interval History:  History taken from: patient  24-Seen and examined at bedside, resting comfortably with wife and daughter.  Discussed pathophysiology of A-fib at great length.  All question concerns addressed.  24 seen in bed NAD, , c/o cough. \"Bronchitis\", prednisone and Symbicort added     Review of Systems    ROS: Pertinent positives as noted in HPI/subjective.  All other systems were reviewed and are negative.     Objective:     Vital Signs  Temp:  [98.2 °F (36.8 °C)-99.7 °F (37.6 °C)] 98.8 °F (37.1 °C)  Heart Rate:  [] 114  Resp:  [14-27] 14  BP: ()/(60-77) 116/71   Body mass index is 35.58 kg/m².      Physical Exam  General: Awake, alert, NAD  HEENT: NC/AT, PERRL, EOMI, conjunctivae are clear, mucous membrane moist, oropharynx clear, Trachea midline   Cardiovascular: Regular rate and irregularly irregular rhythm, no murmurs  Respiratory: Clear to auscultation bilaterally, no wheezing or rales, unlabored breathing  Abdomen: Soft, nontender, " positive bowel sounds, no guarding  Neurologic: A&O, CN grossly intact, moves all extremities spontaneously  Musculoskeletal: Normal range of motion, no other gross deformities  Skin: Warm, dry        Diagnostic Data    Results from last 7 days   Lab Units 11/17/24  1003 11/16/24  0554   WBC 10*3/mm3 10.15 10.06   HEMOGLOBIN g/dL 12.8* 13.6   HEMATOCRIT % 37.9 41.2   PLATELETS 10*3/mm3 202 202   GLUCOSE mg/dL 113* 118*   CREATININE mg/dL 1.12 1.05   BUN mg/dL 14 12   SODIUM mmol/L 135* 138   POTASSIUM mmol/L 3.9 4.3   AST (SGOT) U/L  --  19   ALT (SGPT) U/L  --  12   ALK PHOS U/L  --  62   BILIRUBIN mg/dL  --  1.7*   ANION GAP mmol/L 9.1 10.0       No radiology results for the last day      I reviewed the patient's new clinical results.    Assessment/Plan:     Active and Resolved Problems  Active Hospital Problems    Diagnosis  POA    **Atrial fibrillation with RVR [I48.91]  Yes      Resolved Hospital Problems   No resolved problems to display.     New onset A-fib RVR  -Heart rate in the 150s initially, improved with Cardizem drip, continue to wean down as tolerated  -Lovenox twice daily for anticoagulation for now   -echo     Left ventricular systolic function is normal. Estimated left ventricular EF = 65%    Left ventricular wall thickness is consistent with borderline concentric hypertrophy.    Left ventricular diastolic function was normal.  -BP cardiology consulted, appreciate involvement/input.     Hypertension   -controlled currently, hold home losartan/HCTZ  -Monitor, resume when able     Bronchitis, acute, not a smoker  -recently diagnosed, seems to be doing well, chest x-ray clear, no significant wheezing noted on exam   -Continue symptomatic treatment,   -DuoNebs as needed  -symbicort  -cough syrup       VTE Prophylaxis:  Pharmacologic VTE prophylaxis orders are present.    Disposition Planning:   Barriers to Discharge: Ongoing care  Anticipated Date of Discharge: Pending clinical course  Place of  Discharge: Home    Time: > 50 minutes     Code Status and Medical Interventions: CPR (Attempt to Resuscitate); Full Support   Ordered at: 11/15/24 1925     Code Status (Patient has no pulse and is not breathing):    CPR (Attempt to Resuscitate)     Medical Interventions (Patient has pulse or is breathing):    Full Support       Signature: Electronically signed by Rashid Joyner MD, 11/17/24, 11:00 EST.  [facility] Hospitalist Team

## 2024-11-17 NOTE — PLAN OF CARE
Goal Outcome Evaluation:      Pt resting in bed with spouse at bedside. Pt temp elevated during shift, PRN Tylenol given. Patient currently NPO awaiting DELANEY w possible cardioversion. Consent signed and patient left with call light within reach and safety measures in place.       Problem: Adult Inpatient Plan of Care  Goal: Plan of Care Review  Outcome: Progressing  Goal: Patient-Specific Goal (Individualized)  Outcome: Progressing  Goal: Absence of Hospital-Acquired Illness or Injury  Outcome: Progressing  Intervention: Identify and Manage Fall Risk  Recent Flowsheet Documentation  Taken 11/17/2024 1600 by Margarette Lagos RN  Safety Promotion/Fall Prevention:   safety round/check completed   room organization consistent   nonskid shoes/slippers when out of bed  Taken 11/17/2024 1215 by Margarette Lagos RN  Safety Promotion/Fall Prevention:   safety round/check completed   room organization consistent   nonskid shoes/slippers when out of bed  Taken 11/17/2024 1000 by Margarette Lagos RN  Safety Promotion/Fall Prevention:   safety round/check completed   room organization consistent   nonskid shoes/slippers when out of bed  Taken 11/17/2024 0950 by Margarette Lagos RN  Safety Promotion/Fall Prevention:   safety round/check completed   room organization consistent   nonskid shoes/slippers when out of bed  Intervention: Prevent Skin Injury  Recent Flowsheet Documentation  Taken 11/17/2024 1600 by Margarette Lagos RN  Body Position: position changed independently  Skin Protection: transparent dressing maintained  Taken 11/17/2024 1215 by Margarette Lagos RN  Body Position: position changed independently  Skin Protection: transparent dressing maintained  Taken 11/17/2024 0950 by Margarette Lagos RN  Body Position: position changed independently  Skin Protection: transparent dressing maintained  Intervention: Prevent and Manage VTE (Venous Thromboembolism) Risk  Recent Flowsheet Documentation  Taken  11/17/2024 1600 by Margarette Lagos RN  VTE Prevention/Management:   compression stockings off   SCDs (sequential compression devices) off   patient refused intervention  Goal: Optimal Comfort and Wellbeing  Outcome: Progressing  Intervention: Monitor Pain and Promote Comfort  Recent Flowsheet Documentation  Taken 11/17/2024 1215 by Margarette Lagos RN  Pain Management Interventions: care clustered  Intervention: Provide Person-Centered Care  Recent Flowsheet Documentation  Taken 11/17/2024 0950 by Margarette Lagos RN  Trust Relationship/Rapport:   care explained   empathic listening provided  Goal: Readiness for Transition of Care  Outcome: Progressing     Problem: Pain Acute  Goal: Optimal Pain Control and Function  Outcome: Progressing  Intervention: Optimize Psychosocial Wellbeing  Recent Flowsheet Documentation  Taken 11/17/2024 1600 by Margarette Lagos RN  Supportive Measures: active listening utilized  Diversional Activities: smartphone  Taken 11/17/2024 0950 by Margarette Lagos RN  Diversional Activities: smartphone  Intervention: Develop Pain Management Plan  Recent Flowsheet Documentation  Taken 11/17/2024 1215 by Margarette Lagos RN  Pain Management Interventions: care clustered  Intervention: Prevent or Manage Pain  Recent Flowsheet Documentation  Taken 11/17/2024 1600 by Margarette Lagos RN  Medication Review/Management: medications reviewed  Taken 11/17/2024 1215 by Margarette Lagos RN  Medication Review/Management: medications reviewed  Taken 11/17/2024 1000 by Margarette Lagos RN  Medication Review/Management: medications reviewed  Taken 11/17/2024 0950 by Margarette Lagos RN  Medication Review/Management: medications reviewed     Problem: Dysrhythmia  Goal: Normalized Cardiac Rhythm  Outcome: Progressing

## 2024-11-17 NOTE — NURSING NOTE
Patient's heart rate continues to reach 150, worse with activity and coughing. PO cardizem administered without effectiveness. Reached out to cardiology for advice and orders without return call. Reached out to hospitalist on call, Dr. Cho, received orders for po cardizem 10mg iv push now. Patient updated on plans of care.

## 2024-11-18 ENCOUNTER — APPOINTMENT (OUTPATIENT)
Dept: CARDIOLOGY | Facility: HOSPITAL | Age: 59
DRG: 310 | End: 2024-11-18
Payer: COMMERCIAL

## 2024-11-18 ENCOUNTER — ANESTHESIA EVENT (OUTPATIENT)
Dept: CARDIOLOGY | Facility: HOSPITAL | Age: 59
DRG: 310 | End: 2024-11-18
Payer: COMMERCIAL

## 2024-11-18 ENCOUNTER — ANESTHESIA (OUTPATIENT)
Dept: CARDIOLOGY | Facility: HOSPITAL | Age: 59
DRG: 310 | End: 2024-11-18
Payer: COMMERCIAL

## 2024-11-18 LAB
ANION GAP SERPL CALCULATED.3IONS-SCNC: 10.2 MMOL/L (ref 5–15)
BASOPHILS # BLD AUTO: 0.02 10*3/MM3 (ref 0–0.2)
BASOPHILS NFR BLD AUTO: 0.2 % (ref 0–1.5)
BH CV ECHO SHUNT ASSESSMENT PERFORMED (HIDDEN SCRIPTING): 1
BUN SERPL-MCNC: 23 MG/DL (ref 6–20)
BUN/CREAT SERPL: 17.6 (ref 7–25)
CALCIUM SPEC-SCNC: 8.8 MG/DL (ref 8.6–10.5)
CHLORIDE SERPL-SCNC: 97 MMOL/L (ref 98–107)
CO2 SERPL-SCNC: 26.8 MMOL/L (ref 22–29)
CREAT SERPL-MCNC: 1.31 MG/DL (ref 0.76–1.27)
DEPRECATED RDW RBC AUTO: 43.3 FL (ref 37–54)
EGFRCR SERPLBLD CKD-EPI 2021: 63.5 ML/MIN/1.73
EOSINOPHIL # BLD AUTO: 0.01 10*3/MM3 (ref 0–0.4)
EOSINOPHIL NFR BLD AUTO: 0.1 % (ref 0.3–6.2)
ERYTHROCYTE [DISTWIDTH] IN BLOOD BY AUTOMATED COUNT: 13.3 % (ref 12.3–15.4)
GLUCOSE SERPL-MCNC: 99 MG/DL (ref 65–99)
HCT VFR BLD AUTO: 37.5 % (ref 37.5–51)
HGB BLD-MCNC: 12.3 G/DL (ref 13–17.7)
IMM GRANULOCYTES # BLD AUTO: 0.05 10*3/MM3 (ref 0–0.05)
IMM GRANULOCYTES NFR BLD AUTO: 0.5 % (ref 0–0.5)
LYMPHOCYTES # BLD AUTO: 1.28 10*3/MM3 (ref 0.7–3.1)
LYMPHOCYTES NFR BLD AUTO: 12.5 % (ref 19.6–45.3)
MCH RBC QN AUTO: 29.4 PG (ref 26.6–33)
MCHC RBC AUTO-ENTMCNC: 32.8 G/DL (ref 31.5–35.7)
MCV RBC AUTO: 89.5 FL (ref 79–97)
MONOCYTES # BLD AUTO: 1.68 10*3/MM3 (ref 0.1–0.9)
MONOCYTES NFR BLD AUTO: 16.4 % (ref 5–12)
NEUTROPHILS NFR BLD AUTO: 7.21 10*3/MM3 (ref 1.7–7)
NEUTROPHILS NFR BLD AUTO: 70.3 % (ref 42.7–76)
NRBC BLD AUTO-RTO: 0 /100 WBC (ref 0–0.2)
PLATELET # BLD AUTO: 221 10*3/MM3 (ref 140–450)
PMV BLD AUTO: 11.1 FL (ref 6–12)
POTASSIUM SERPL-SCNC: 3.9 MMOL/L (ref 3.5–5.2)
QT INTERVAL: 438 MS
QTC INTERVAL: 479 MS
RBC # BLD AUTO: 4.19 10*6/MM3 (ref 4.14–5.8)
SODIUM SERPL-SCNC: 134 MMOL/L (ref 136–145)
WBC NRBC COR # BLD AUTO: 10.25 10*3/MM3 (ref 3.4–10.8)

## 2024-11-18 PROCEDURE — 25810000003 SODIUM CHLORIDE 0.9 % SOLUTION

## 2024-11-18 PROCEDURE — 25010000002 LIDOCAINE PF 2% 2 % SOLUTION

## 2024-11-18 PROCEDURE — 94664 DEMO&/EVAL PT USE INHALER: CPT

## 2024-11-18 PROCEDURE — 93010 ELECTROCARDIOGRAM REPORT: CPT | Performed by: INTERNAL MEDICINE

## 2024-11-18 PROCEDURE — 93312 ECHO TRANSESOPHAGEAL: CPT

## 2024-11-18 PROCEDURE — 93005 ELECTROCARDIOGRAM TRACING: CPT | Performed by: INTERNAL MEDICINE

## 2024-11-18 PROCEDURE — 93320 DOPPLER ECHO COMPLETE: CPT

## 2024-11-18 PROCEDURE — 25010000002 PROPOFOL 10 MG/ML EMULSION

## 2024-11-18 PROCEDURE — 63710000001 PREDNISONE PER 1 MG: Performed by: INTERNAL MEDICINE

## 2024-11-18 PROCEDURE — 93321 DOPPLER ECHO F-UP/LMTD STD: CPT

## 2024-11-18 PROCEDURE — 85025 COMPLETE CBC W/AUTO DIFF WBC: CPT | Performed by: INTERNAL MEDICINE

## 2024-11-18 PROCEDURE — 5A2204Z RESTORATION OF CARDIAC RHYTHM, SINGLE: ICD-10-PCS | Performed by: STUDENT IN AN ORGANIZED HEALTH CARE EDUCATION/TRAINING PROGRAM

## 2024-11-18 PROCEDURE — 93321 DOPPLER ECHO F-UP/LMTD STD: CPT | Performed by: INTERNAL MEDICINE

## 2024-11-18 PROCEDURE — 80048 BASIC METABOLIC PNL TOTAL CA: CPT | Performed by: INTERNAL MEDICINE

## 2024-11-18 PROCEDURE — 94761 N-INVAS EAR/PLS OXIMETRY MLT: CPT

## 2024-11-18 PROCEDURE — 94799 UNLISTED PULMONARY SVC/PX: CPT

## 2024-11-18 PROCEDURE — 63710000001 PREDNISONE PER 5 MG: Performed by: INTERNAL MEDICINE

## 2024-11-18 PROCEDURE — 92960 CARDIOVERSION ELECTRIC EXT: CPT

## 2024-11-18 PROCEDURE — 93325 DOPPLER ECHO COLOR FLOW MAPG: CPT | Performed by: INTERNAL MEDICINE

## 2024-11-18 PROCEDURE — 92960 CARDIOVERSION ELECTRIC EXT: CPT | Performed by: INTERNAL MEDICINE

## 2024-11-18 PROCEDURE — 93312 ECHO TRANSESOPHAGEAL: CPT | Performed by: INTERNAL MEDICINE

## 2024-11-18 PROCEDURE — 93325 DOPPLER ECHO COLOR FLOW MAPG: CPT

## 2024-11-18 RX ORDER — PROPOFOL 10 MG/ML
VIAL (ML) INTRAVENOUS AS NEEDED
Status: DISCONTINUED | OUTPATIENT
Start: 2024-11-18 | End: 2024-11-18 | Stop reason: SURG

## 2024-11-18 RX ORDER — SODIUM CHLORIDE 9 MG/ML
INJECTION, SOLUTION INTRAVENOUS CONTINUOUS PRN
Status: DISCONTINUED | OUTPATIENT
Start: 2024-11-18 | End: 2024-11-18 | Stop reason: SURG

## 2024-11-18 RX ORDER — LIDOCAINE HYDROCHLORIDE 20 MG/ML
INJECTION, SOLUTION EPIDURAL; INFILTRATION; INTRACAUDAL; PERINEURAL AS NEEDED
Status: DISCONTINUED | OUTPATIENT
Start: 2024-11-18 | End: 2024-11-18 | Stop reason: SURG

## 2024-11-18 RX ADMIN — ACETAMINOPHEN 650 MG: 325 TABLET, FILM COATED ORAL at 12:39

## 2024-11-18 RX ADMIN — IPRATROPIUM BROMIDE AND ALBUTEROL SULFATE 3 ML: .5; 3 SOLUTION RESPIRATORY (INHALATION) at 08:03

## 2024-11-18 RX ADMIN — IPRATROPIUM BROMIDE AND ALBUTEROL SULFATE 3 ML: .5; 3 SOLUTION RESPIRATORY (INHALATION) at 20:14

## 2024-11-18 RX ADMIN — PROPOFOL 50 MG: 10 INJECTION, EMULSION INTRAVENOUS at 10:29

## 2024-11-18 RX ADMIN — Medication 10 ML: at 12:36

## 2024-11-18 RX ADMIN — IPRATROPIUM BROMIDE AND ALBUTEROL SULFATE 3 ML: .5; 3 SOLUTION RESPIRATORY (INHALATION) at 11:44

## 2024-11-18 RX ADMIN — DILTIAZEM HYDROCHLORIDE 60 MG: 60 TABLET, FILM COATED ORAL at 12:39

## 2024-11-18 RX ADMIN — PROPOFOL 50 MG: 10 INJECTION, EMULSION INTRAVENOUS at 10:32

## 2024-11-18 RX ADMIN — APIXABAN 5 MG: 5 TABLET, FILM COATED ORAL at 12:35

## 2024-11-18 RX ADMIN — PROPOFOL 100 MG: 10 INJECTION, EMULSION INTRAVENOUS at 10:22

## 2024-11-18 RX ADMIN — BUDESONIDE AND FORMOTEROL FUMARATE DIHYDRATE 2 PUFF: 160; 4.5 AEROSOL RESPIRATORY (INHALATION) at 20:11

## 2024-11-18 RX ADMIN — Medication 10 ML: at 20:50

## 2024-11-18 RX ADMIN — PREDNISONE 30 MG: 20 TABLET ORAL at 12:35

## 2024-11-18 RX ADMIN — GUAIFENESIN 600 MG: 600 TABLET, EXTENDED RELEASE ORAL at 20:49

## 2024-11-18 RX ADMIN — GUAIFENESIN 600 MG: 600 TABLET, EXTENDED RELEASE ORAL at 12:36

## 2024-11-18 RX ADMIN — AMOXICILLIN 875 MG: 875 TABLET, FILM COATED ORAL at 12:35

## 2024-11-18 RX ADMIN — AMOXICILLIN 875 MG: 875 TABLET, FILM COATED ORAL at 20:49

## 2024-11-18 RX ADMIN — PROPOFOL 50 MG: 10 INJECTION, EMULSION INTRAVENOUS at 10:25

## 2024-11-18 RX ADMIN — DRONEDARONE 400 MG: 400 TABLET, FILM COATED ORAL at 18:16

## 2024-11-18 RX ADMIN — PROPOFOL 50 MG: 10 INJECTION, EMULSION INTRAVENOUS at 10:35

## 2024-11-18 RX ADMIN — SODIUM CHLORIDE: 9 INJECTION, SOLUTION INTRAVENOUS at 10:02

## 2024-11-18 RX ADMIN — GUAIFENESIN AND CODEINE PHOSPHATE 5 ML: 100; 10 SOLUTION ORAL at 20:49

## 2024-11-18 RX ADMIN — PROPOFOL 50 MG: 10 INJECTION, EMULSION INTRAVENOUS at 10:39

## 2024-11-18 RX ADMIN — APIXABAN 5 MG: 5 TABLET, FILM COATED ORAL at 20:49

## 2024-11-18 RX ADMIN — GENTAMICIN SULFATE 2 DROP: 3 SOLUTION OPHTHALMIC at 20:49

## 2024-11-18 RX ADMIN — GENTAMICIN SULFATE 2 DROP: 3 SOLUTION OPHTHALMIC at 18:16

## 2024-11-18 RX ADMIN — LIDOCAINE HYDROCHLORIDE 100 MG: 20 INJECTION, SOLUTION EPIDURAL; INFILTRATION; INTRACAUDAL; PERINEURAL at 10:22

## 2024-11-18 RX ADMIN — GENTAMICIN SULFATE 2 DROP: 3 SOLUTION OPHTHALMIC at 12:40

## 2024-11-18 NOTE — PAYOR COMM NOTE
"This is inpatient sbumission for Kenny Cruz.    Inpatient order 24.        AUTHORIZATION PENDING:   PLEASE FAX OR CALL DETERMINATION TO CONTACT BELOW:   THANK YOU.      Joana Wolf, RN, [facility]  Utilization Nurse  [address] [address]   [phone]  Fx [phone]     Kenny Cruz (59 y.o. Male)       Date of Birth   1965    Social Security Number       Address   [address]  [place] [address]    Home Phone   [phone]    MRN   [id number]       Presybeterian   Confucianist    Marital Status                               Admission Date   11/15/24    Admission Type   Emergency    Admitting Provider       Attending Provider   Elis Singh MD    Department, Room/Bed   [facility] PROGRESS CARE,        Discharge Date       Discharge Disposition       Discharge Destination                                 Attending Provider: Elis Singh MD    Allergies: No Known Allergies    Isolation: None   Infection: None   Code Status: CPR    Ht: 182.9 cm (72\")   Wt: 117 kg (257 lb 6.2 oz)    Admission Cmt: None   Principal Problem: Atrial fibrillation with RVR [I48.91]                   Active Insurance as of 11/15/2024       Primary Coverage       Payor Plan Insurance Group Employer/Plan Group    [facility] PPO [id number]       Payor Plan Address Payor Plan Phone Number Payor Plan Fax Number Effective Dates    [address] [phone]  2018 - None Entered    [place] [address]         Subscriber Name Subscriber Birth Date Member ID       KENNY CRUZ 1965 [id number]                     Emergency Contacts        (Rel.) Home Phone Work Phone Mobile Phone    TAMRA MINAYA (Spouse) -- -- [phone]                 History & Physical        Brendon Cho DO at 11/15/24 2104              Hospitalist Service  History and Physical      Patient Name: Kenny Cruz  : " 1965  MRN: [id number]  Primary Care Physician:  Cherelle Bernabe APRN  Date of admission: 11/15/2024      Subjective      Chief Complaint: Rapid heartbeat    History of Present Illness: Kenny Cruz is a 59 y.o. male with past medical history of hypertension who presented to [facility] on 11/15/2024 from urgent care with complaints of rapid heartbeat.  Patient states she has been dealing with cough and congestion recently and went to urgent care and was diagnosed with bronchitis. He was taking TheraFlu he took some last night today he noticed his heart was running fast. He had with the lightheadedness and dizziness. He went to urgent care and he was referred here for heart rate that is running in the 150s. He denies any chest pain neck arm jaw pain or shortness of breath.  Noted to be in A-fib RVR, new onset.  Started on Cardizem drip.      ROS: Pertinent positives as noted in HPI/subjective.  All other systems were reviewed and are negative.      Personal History     Past Medical History:   Diagnosis Date    Hypertension        No past surgical history on file.    Family History: family history includes Coronary artery disease in his father; Diabetes in his sister; Obesity in his sister; Other in his mother; Valvular heart disease in his mother. Otherwise pertinent FHx was reviewed and not pertinent to current issue.    Social History:  reports that he has never smoked. He has never used smokeless tobacco. He reports current alcohol use. He reports that he does not use drugs.    Home Medications:  Prior to Admission Medications       Prescriptions Last Dose Informant Patient Reported? Taking?    acetaminophen (TYLENOL) 500 MG tablet 11/15/2024  Yes Yes    Take 1 tablet by mouth Every 6 (Six) Hours As Needed for Mild Pain.    losartan-hydrochlorothiazide (HYZAAR) 50-12.5 MG per tablet 11/15/2024  No Yes    TAKE 1 TABLET DAILY    albuterol sulfate  (90 Base) MCG/ACT inhaler Not Taking  No No     Inhale 2 puffs Every 4 (Four) Hours As Needed for Wheezing or Shortness of Air.    Patient not taking:  Reported on 11/15/2024    benzonatate (TESSALON) 200 MG capsule   No No    Take 1 capsule by mouth 3 (Three) Times a Day As Needed for Cough.    Patient not taking:  Reported on 11/15/2024    predniSONE (DELTASONE) 10 MG (21) dose pack   No No    Take  by mouth Daily for 6 days. Use as directed on package    Patient not taking:  Reported on 11/15/2024              I have utilized all available, immediate resources to obtain, update, or review the patient's current medications including all prescriptions, over-the-counter products, herbals, cannabis/cannabidiol products, and vitamin.mineral/dietary (nutritional) supplements.    Allergies:  No Known Allergies    Objective      Vitals:   Temp:  [98.8 °F (37.1 °C)] 98.8 °F (37.1 °C)  Heart Rate:  [] 92  Resp:  [18-20] 18  BP: (104-137)/(64-88) 132/88    Physical Exam:    General: Awake, alert, NAD  HEENT: NC/AT, PERRL, EOMI, conjunctivae are clear, mucous membrane moist, oropharynx clear, Trachea midline   Cardiovascular: Regular rate and irregularly irregular rhythm, no murmurs  Respiratory: Clear to auscultation bilaterally, no wheezing or rales, unlabored breathing  Abdomen: Soft, nontender, positive bowel sounds, no guarding  Neurologic: A&O, CN grossly intact, moves all extremities spontaneously  Musculoskeletal: Normal range of motion, no other gross deformities  Skin: Warm, dry      Result Review   Result Review:  I have personally reviewed the results from the time of this admission to 11/15/2024 21:08 EST and agree with these findings:  [x]  Laboratory  [x]  Microbiology  [x]  Radiology  [x]  EKG/Telemetry   [x]  Cardiology/Vascular   []  Pathology  [x]  Old records  []  Other:      Assessment & Plan        Active Hospital Problems:  Active Hospital Problems    Diagnosis     **Atrial fibrillation with RVR        Assessment/Plan:     New onset A-fib  RVR  -Heart rate in the 150s initially, improved with Cardizem drip, continue to wean down as tolerated  -Lovenox twice daily for anticoagulation for now   -echo ordered  -Cardiology consult    Hypertension   -controlled currently, hold home losartan/HCTZ  -Monitor, resume when able    Bronchitis, improved   -recently diagnosed, seems to be doing well, chest x-ray clear, no significant wheezing noted on exam   -Continue symptomatic treatment, DuoNebs as needed    VTE Prophylaxis:  Pharmacologic VTE prophylaxis orders are present.        CODE STATUS:    Code Status (Patient has no pulse and is not breathing): CPR (Attempt to Resuscitate)  Medical Interventions (Patient has pulse or is breathing): Full Support      Admission Status:  I believe this patient meets obs status.    Signature:   Electronically signed by Brendon Cho DO, 11/15/24, 9:04 PM EST.    Part of this note may be an electronic transcription/translation of spoken language to printed text using the Dragon Dictation System.      Electronically signed by Brendon Cho DO at 11/15/24 2110          Emergency Department Notes        Antonio Mims MD at 11/15/24 1832          Subjective   History of Present Illness  Chief complaint rapid heartbeat    History of present is a 59-year-old gentleman sent from the urgent care for rapid heartbeat.  The patient's had a cold cough congestion recently.  He initially been seen at the urgent care and diagnosed with bronchitis.  He was taking TheraFlu he took some last night today he noticed his heart was running fast.  He had with the lightheadedness and dizziness.  He went to urgent care and he was referred here for heart rate that is running in the 150s.  He denies any chest pain neck arm jaw pain or shortness of breath.  He has had no fever chills but just cough congestion.  No leg pain or swelling no foreign travels no recent long car ride plane or immobilization antibiotic use or hospitalization.      Review  of Systems   Constitutional:  Negative for chills and fever.   HENT:  Positive for congestion.    Respiratory:  Positive for cough. Negative for chest tightness and shortness of breath.    Cardiovascular:  Positive for palpitations. Negative for chest pain.   Gastrointestinal:  Negative for abdominal pain and vomiting.   Musculoskeletal:  Negative for back pain and neck pain.   Skin:  Negative for rash.   Neurological:  Positive for light-headedness. Negative for dizziness, facial asymmetry and speech difficulty.       Past Medical History:   Diagnosis Date    Hypertension        No Known Allergies    No past surgical history on file.    Family History   Problem Relation Age of Onset    Valvular heart disease Mother     Other Mother         pacemaker    Coronary artery disease Father         CABG x 2     Diabetes Sister     Obesity Sister        Social History     Socioeconomic History    Marital status:     Number of children: 1   Tobacco Use    Smoking status: Never    Smokeless tobacco: Never   Vaping Use    Vaping status: Never Used   Substance and Sexual Activity    Alcohol use: Yes     Comment: rare    Drug use: No    Sexual activity: Defer     Prior to Admission medications    Medication Sig Start Date End Date Taking? Authorizing Provider   albuterol sulfate  (90 Base) MCG/ACT inhaler Inhale 2 puffs Every 4 (Four) Hours As Needed for Wheezing or Shortness of Air. 11/13/24   Shonda Colunga APRN   benzonatate (TESSALON) 200 MG capsule Take 1 capsule by mouth 3 (Three) Times a Day As Needed for Cough.  Patient not taking: Reported on 11/15/2024 11/13/24   Shonda Colunga APRN   losartan-hydrochlorothiazide (HYZAAR) 50-12.5 MG per tablet TAKE 1 TABLET DAILY 10/16/24   Jonathan Mcdonald PA-C   predniSONE (DELTASONE) 10 MG (21) dose pack Take  by mouth Daily for 6 days. Use as directed on package  Patient not taking: Reported on 11/15/2024 11/13/24 11/19/24  Shonda Colunga APRN             Objective   Physical Exam  Constitutional 59-year-old gentleman awake alert no distress triage vital signs reviewed heart rate 160 on the monitor looks to be A-fib versus a flutter.  HEENT extraocular muscles are intact pupils equal and reactive mouth clear neck supple no adenopathy no JV no bruits lungs clear no retraction heart regular but tachycardic without murmur rub abdomen soft nontender good bowel sounds no peritoneal findings or pulsatile masses extremities pulses equal upper and lower extremities no edema no cords no Homans' sign no evidence of DVT skin is warm and dry without rashes or cellulitic changes neurologic awake alert follows commands motor strength normal without focal weakness  Procedures          ED Course      Results for orders placed or performed during the hospital encounter of 11/15/24   ECG 12 Lead Tachycardia    Collection Time: 11/15/24  4:26 PM   Result Value Ref Range    QT Interval 365 ms    QTC Interval 583 ms   Green Top (Gel)    Collection Time: 11/15/24  5:02 PM   Result Value Ref Range    Extra Tube Hold for add-ons.    Gold Top - SST    Collection Time: 11/15/24  5:02 PM   Result Value Ref Range    Extra Tube Hold for add-ons.    Light Blue Top    Collection Time: 11/15/24  5:02 PM   Result Value Ref Range    Extra Tube Hold for add-ons.    Basic Metabolic Panel    Collection Time: 11/15/24  5:03 PM    Specimen: Blood   Result Value Ref Range    Glucose 103 (H) 65 - 99 mg/dL    BUN 11 6 - 20 mg/dL    Creatinine 1.15 0.76 - 1.27 mg/dL    Sodium 138 136 - 145 mmol/L    Potassium 3.9 3.5 - 5.2 mmol/L    Chloride 101 98 - 107 mmol/L    CO2 25.7 22.0 - 29.0 mmol/L    Calcium 9.3 8.6 - 10.5 mg/dL    BUN/Creatinine Ratio 9.6 7.0 - 25.0    Anion Gap 11.3 5.0 - 15.0 mmol/L    eGFR 74.2 >60.0 mL/min/1.73   TSH Rfx On Abnormal To Free T4    Collection Time: 11/15/24  5:03 PM    Specimen: Blood   Result Value Ref Range    TSH 2.800 0.270 - 4.200 uIU/mL   Magnesium    Collection  Time: 11/15/24  5:03 PM    Specimen: Blood   Result Value Ref Range    Magnesium 2.0 1.6 - 2.6 mg/dL   CBC Auto Differential    Collection Time: 11/15/24  5:03 PM    Specimen: Blood   Result Value Ref Range    WBC 11.22 (H) 3.40 - 10.80 10*3/mm3    RBC 4.73 4.14 - 5.80 10*6/mm3    Hemoglobin 14.2 13.0 - 17.7 g/dL    Hematocrit 42.3 37.5 - 51.0 %    MCV 89.4 79.0 - 97.0 fL    MCH 30.0 26.6 - 33.0 pg    MCHC 33.6 31.5 - 35.7 g/dL    RDW 13.2 12.3 - 15.4 %    RDW-SD 43.3 37.0 - 54.0 fl    MPV 11.1 6.0 - 12.0 fL    Platelets 215 140 - 450 10*3/mm3    Neutrophil % 67.2 42.7 - 76.0 %    Lymphocyte % 14.9 (L) 19.6 - 45.3 %    Monocyte % 14.9 (H) 5.0 - 12.0 %    Eosinophil % 2.1 0.3 - 6.2 %    Basophil % 0.6 0.0 - 1.5 %    Immature Grans % 0.3 0.0 - 0.5 %    Neutrophils, Absolute 7.54 (H) 1.70 - 7.00 10*3/mm3    Lymphocytes, Absolute 1.67 0.70 - 3.10 10*3/mm3    Monocytes, Absolute 1.67 (H) 0.10 - 0.90 10*3/mm3    Eosinophils, Absolute 0.24 0.00 - 0.40 10*3/mm3    Basophils, Absolute 0.07 0.00 - 0.20 10*3/mm3    Immature Grans, Absolute 0.03 0.00 - 0.05 10*3/mm3    nRBC 0.0 0.0 - 0.2 /100 WBC   ECG 12 Lead Tachycardia    Collection Time: 11/15/24  6:15 PM   Result Value Ref Range    QT Interval 431 ms    QTC Interval 567 ms     No radiology results for the last day  Medications   sodium chloride 0.9 % flush 10 mL (has no administration in time range)   dilTIAZem (CARDIZEM) 125 mg in 125 mL D5W infusion (10 mg/hr Intravenous Rate/Dose Change 11/15/24 1812)   dilTIAZem (CARDIZEM) injection 25 mg (has no administration in time range)   Enoxaparin Sodium (LOVENOX) syringe 120 mg (has no administration in time range)   dilTIAZem (CARDIZEM) injection 20 mg (20 mg Intravenous Given 11/15/24 1709)                                            KG #1 my interpretation atrial fibrillation rate of 155 nonspecific diffuse T wave changes noted.  No acute ST elevation QTc of 583 abnormal EKG with nothing old to compare with next line EKG #2  my interpretation atrial fibrillation atrial flutter rate of 100 incomplete right bundle branch block QTc of 567 unchanged early 1 other than a slower rate abnormal EKG            Medical Decision Making  Medical decision making.  Patient IV established monitor placement review atrial fibrillation rate of 160.  EKG my interpretation atrial fibrillation rate of 155 nonspecific T wave changes no acute ST elevation abnormal with nothing old to compare with.  Patient was started on Cardizem initially and 20 mg bolus and a drip at 5 mg an hour.  Then drip was titrated initially came down to a heart rate of 100 but would bounce back up to 120s it was titrated up to 15.  Patient required additional 25 mg bolus IV.  And now heart rate is in the 70s but still atrial fibrillation or atrial flutter.  Labs obtained by independent reviewed basic metabolic profile unremarkable magnesium and CBC unremarkable TSH normal patient just had a chest x-ray on the 13th of this month that was reviewed and there is no active disease noted on that per radiology report.  This was done at the urgent care center.  Denies lungs are clear sats are 97% respiratory of 18 I do not suspect any pneumonia or failure based on clinical exam.  I do not see evidence that would suggest acute cardiac ischemia DVT pulmonary embolism or dissection myocardial infarction.  No evidence just pericarditis myocarditis or pericardial effusion sepsis or bacteremia.  Patient made aware of the findings.  This may be related to his use recently.  We talked about the findings he is given Lovenox 1 mg kilogram subcutaneously for stroke prophylaxis I talked to the hospitalist we discussed the case.  Patient will be admitted for further care stable unremarkable improved ER course critical care 35 minutes    Problems Addressed:  Atrial fibrillation with rapid ventricular response: complicated acute illness or injury    Amount and/or Complexity of Data Reviewed  External Data  Reviewed: radiology.  Labs: ordered. Decision-making details documented in ED Course.  ECG/medicine tests: ordered and independent interpretation performed. Decision-making details documented in ED Course.    Risk  Drug therapy requiring intensive monitoring for toxicity.  Decision regarding hospitalization.        Final diagnoses:   Atrial fibrillation with rapid ventricular response       ED Disposition  ED Disposition       ED Disposition   Decision to Admit    Condition   --    Comment   Level of Care: Telemetry [5]   Admitting Physician: ARNULFO GALLARDO [[id number]]   Attending Physician: ARNULFO GALLARDO [[id number]]   Bed Request Comments: pcu                 No follow-up provider specified.       Medication List      No changes were made to your prescriptions during this visit.            Antonio Mims MD  11/15/24 192      Electronically signed by Antonio Mims MD at 11/15/24 192       Richa Allen RN at 11/15/24 1650          Patient fought broncitis this week, today had palpations. Hr 152     Electronically signed by Richa Allen RN at 11/15/24 1650       Operative/Procedure Notes (last 72 hours)  Notes from 11/15/24 1108 through 24 1108   No notes of this type exist for this encounter.          Physician Progress Notes (last 72 hours)        Suzan Beyer MD at 24 1242              [facility] MEDICINE SERVICE  DAILY PROGRESS NOTE    NAME: Kenny Cruz  : 1965  MRN: [id number]      LOS: 0 days     PROVIDER OF SERVICE: Suzan Beyer MD    Chief Complaint: Atrial fibrillation with RVR    Subjective:     Interval History:    Patient seen and evaluated at bedside.   Cough about the same, denies shortness of air nausea vomiting chest pain.  Treatment plan discussed with patient. All questions addressed.     Review of Systems:   All 21 ROS were negative except mentioned above.    Objective:     Vital Signs  Temp:  [98.2 °F (36.8 °C)-99.5 °F (37.5 °C)] 98.8 °F (37.1 °C)  Heart Rate:  []  114  Resp:  [14-27] 14  BP: (105-133)/(60-77) 116/71   Body mass index is 35.58 kg/m².    Physical Exam   General: No acute distress, appears stated age  Neuro: Awake and alert, oriented x3, no focal deficits appreciated  Head: atraumatic, normocephalic  HEENT: EOMI, anicteric, erythematous right conjunctiva   moist mucus membranes  Neck: supple, no lymphadenopathy  CV: irr R, RR, soft heart sounds, no murmurs appreciated, no peripheral edema  Pulm: Decreased breath sounds, no increased work of breathing, no adventitious sounds  Abd: Soft, nontender, nondistended  Skin: Warm, dry and intact  Psych: Appropriate mood and affect    Scheduled Meds   amoxicillin, 875 mg, Oral, Q12H  apixaban, 5 mg, Oral, Q12H  budesonide-formoterol, 2 puff, Inhalation, BID - RT  dilTIAZem, 60 mg, Oral, Q8H  dronedarone, 400 mg, Oral, BID With Meals  guaiFENesin, 600 mg, Oral, Q12H  predniSONE, 30 mg, Oral, Daily   Followed by  [START ON 11/19/2024] predniSONE, 20 mg, Oral, Daily   Followed by  [START ON 11/21/2024] predniSONE, 10 mg, Oral, Daily  sodium chloride, 10 mL, Intravenous, Q12H       PRN Meds     acetaminophen **OR** acetaminophen **OR** acetaminophen    senna-docusate sodium **AND** polyethylene glycol **AND** bisacodyl **AND** bisacodyl    Calcium Replacement - Follow Nurse / BPA Driven Protocol    guaiFENesin-codeine    influenza vaccine    ipratropium-albuterol    Magnesium Cardiology Dose Replacement - Follow Nurse / BPA Driven Protocol    nitroglycerin    ondansetron ODT **OR** ondansetron    Phosphorus Replacement - Follow Nurse / BPA Driven Protocol    Potassium Replacement - Follow Nurse / BPA Driven Protocol    [COMPLETED] Insert Peripheral IV **AND** sodium chloride    sodium chloride    sodium chloride   Infusions         Diagnostic Data    Results from last 7 days   Lab Units 11/17/24  1003 11/16/24  0554   WBC 10*3/mm3 10.15 10.06   HEMOGLOBIN g/dL 12.8* 13.6   HEMATOCRIT % 37.9 41.2   PLATELETS 10*3/mm3 202 202    GLUCOSE mg/dL 113* 118*   CREATININE mg/dL 1.12 1.05   BUN mg/dL 14 12   SODIUM mmol/L 135* 138   POTASSIUM mmol/L 3.9 4.3   AST (SGOT) U/L  --  19   ALT (SGPT) U/L  --  12   ALK PHOS U/L  --  62   BILIRUBIN mg/dL  --  1.7*   ANION GAP mmol/L 9.1 10.0       No radiology results for the last day    Interval results reviewed.    Assessment/Plan:   New onset atrial flutter  Acute bronchitis  Acute right conjunctivitis  Hypertension     Cardiology following the patient, 2D echo reviewed.  Patient on oral calcium channel blocker and Multaq by cardiology.  Cardiology also started patient on Eliquis.   pt will go guided cardioversion for symptomatic relief.    Continue Cardizem for blood pressure.     Continue patient on Amoxil 875 twice daily for bronchitis.     Add gentamicin 2 drops to right eye 4 times daily for conjunctivitis        Treatment plan discussed with RN.     VTE Prophylaxis:  Pharmacologic VTE prophylaxis orders are present.         Code status is   Code Status and Medical Interventions: CPR (Attempt to Resuscitate); Full Support   Ordered at: 11/15/24 1925     Code Status (Patient has no pulse and is not breathing):    CPR (Attempt to Resuscitate)     Medical Interventions (Patient has pulse or is breathing):    Full Support       Plan for disposition:     Barriers to Discharge: Cardioversion  Anticipated Date of Discharge: 11/19/2024  Place of Discharge: Home      Time: 40 minutes     Signature: Electronically signed by Suzan Beyer MD, 11/17/24, 12:43 EST.  [facility] Hospitalist Team     Electronically signed by Suzan Beyer MD at 11/17/24 1253       Angelito Garcia MD at 11/17/24 1143          CC--- bronchitis and atrial flutter    Sub    Patient continues to be in atrial flutter despite starting Multaq  without symptoms  Currently has a pinkeye on the right side          Past Medical History:   Diagnosis Date    Hypertension      History reviewed. No pertinent surgical history.  Family  History   Problem Relation Age of Onset    Valvular heart disease Mother     Other Mother         pacemaker    Coronary artery disease Father         CABG x 2     Diabetes Sister     Obesity Sister        Physical Exam    General:      well developed, well nourished, in no acute distress.    Head:      normocephalic and atraumatic.    Eyes:      PERRL/EOM intact, conjunctivae and sclerae clear without nystagmus.    Neck:      no  thyromegaly, trachea central with normal respiratory effort  Lungs:      clear bilaterally to auscultation.    Heart:       irregular rate and rhythm, S1, S2 without murmurs, rubs, or gallops  Skin:      intact without lesions or rashes.    Psych:      alert and cooperative; normal mood and affect; normal attention span and concentration.        CBC    Results from last 7 days   Lab Units 11/17/24  1003 11/16/24  0554 11/15/24  1703   WBC 10*3/mm3 10.15 10.06 11.22*   HEMOGLOBIN g/dL 12.8* 13.6 14.2   PLATELETS 10*3/mm3 202 202 215     BMP   Results from last 7 days   Lab Units 11/17/24  1003 11/16/24  0554 11/15/24  1703   SODIUM mmol/L 135* 138 138   POTASSIUM mmol/L 3.9 4.3 3.9   CHLORIDE mmol/L 99 102 101   CO2 mmol/L 26.9 26.0 25.7   BUN mg/dL 14 12 11   CREATININE mg/dL 1.12 1.05 1.15   GLUCOSE mg/dL 113* 118* 103*   MAGNESIUM mg/dL  --  2.0 2.0   PHOSPHORUS mg/dL  --  2.5  --          Assessment plan    Ongoing atrial flutter an ideal solution will be flutter ablation--- started on Multaq and continues to be in flutter with intermittent rapid heart rates without symptoms and has normal EF  Since patient is in bronchitis and also a pinkeye on the right side--- I would recommend that he had a DELANEY guided cardioversion for symptomatic relief if there is no pharmacological conversion  Patient likely end up needing flutter ablation in future  Discussed with the patient  I also discussed with Dr. De Leon to do the procedure since I do not have opening tomorrow      Electronically signed by  "Angelito Garcia MD, 24, 11:46 AM EST.      Electronically signed by Angelito Garcia MD at 24 1146       Rashid Joyner MD at 24 1100          .    [facility] MEDICINE SERVICE  DAILY PROGRESS NOTE    NAME: Kenny Cruz  : 1965  MRN: [id number]      LOS: 0 days     PROVIDER OF SERVICE: Rashid Joyner MD    Chief Complaint: Atrial fibrillation with RVR    Subjective:     History of Present Illness: Kenny Cruz is a 59 y.o. male with past medical history of hypertension who presented to [facility] on 11/15/2024 from urgent care with complaints of rapid heartbeat.  Patient states she has been dealing with cough and congestion recently and went to urgent care and was diagnosed with bronchitis. He was taking TheraFlu he took some last night today he noticed his heart was running fast. He had with the lightheadedness and dizziness. He went to urgent care and he was referred here for heart rate that is running in the 150s. He denies any chest pain neck arm jaw pain or shortness of breath.  Noted to be in A-fib RVR, new onset.  Started on Cardizem drip.     Interval History:  History taken from: patient  24-Seen and examined at bedside, resting comfortably with wife and daughter.  Discussed pathophysiology of A-fib at great length.  All question concerns addressed.  24 seen in bed NAD, , c/o cough. \"Bronchitis\", prednisone and Symbicort added     Review of Systems    ROS: Pertinent positives as noted in HPI/subjective.  All other systems were reviewed and are negative.     Objective:     Vital Signs  Temp:  [98.2 °F (36.8 °C)-99.7 °F (37.6 °C)] 98.8 °F (37.1 °C)  Heart Rate:  [] 114  Resp:  [14-27] 14  BP: ()/(60-77) 116/71   Body mass index is 35.58 kg/m².      Physical Exam  General: Awake, alert, NAD  HEENT: NC/AT, PERRL, EOMI, conjunctivae are clear, mucous membrane moist, oropharynx clear, Trachea midline   Cardiovascular: Regular " rate and irregularly irregular rhythm, no murmurs  Respiratory: Clear to auscultation bilaterally, no wheezing or rales, unlabored breathing  Abdomen: Soft, nontender, positive bowel sounds, no guarding  Neurologic: A&O, CN grossly intact, moves all extremities spontaneously  Musculoskeletal: Normal range of motion, no other gross deformities  Skin: Warm, dry        Diagnostic Data    Results from last 7 days   Lab Units 11/17/24  1003 11/16/24  0554   WBC 10*3/mm3 10.15 10.06   HEMOGLOBIN g/dL 12.8* 13.6   HEMATOCRIT % 37.9 41.2   PLATELETS 10*3/mm3 202 202   GLUCOSE mg/dL 113* 118*   CREATININE mg/dL 1.12 1.05   BUN mg/dL 14 12   SODIUM mmol/L 135* 138   POTASSIUM mmol/L 3.9 4.3   AST (SGOT) U/L  --  19   ALT (SGPT) U/L  --  12   ALK PHOS U/L  --  62   BILIRUBIN mg/dL  --  1.7*   ANION GAP mmol/L 9.1 10.0       No radiology results for the last day      I reviewed the patient's new clinical results.    Assessment/Plan:     Active and Resolved Problems  Active Hospital Problems    Diagnosis  POA    **Atrial fibrillation with RVR [I48.91]  Yes      Resolved Hospital Problems   No resolved problems to display.     New onset A-fib RVR  -Heart rate in the 150s initially, improved with Cardizem drip, continue to wean down as tolerated  -Lovenox twice daily for anticoagulation for now   -echo     Left ventricular systolic function is normal. Estimated left ventricular EF = 65%    Left ventricular wall thickness is consistent with borderline concentric hypertrophy.    Left ventricular diastolic function was normal.  -BP cardiology consulted, appreciate involvement/input.     Hypertension   -controlled currently, hold home losartan/HCTZ  -Monitor, resume when able     Bronchitis, acute, not a smoker  -recently diagnosed, seems to be doing well, chest x-ray clear, no significant wheezing noted on exam   -Continue symptomatic treatment,   -DuoNebs as needed  -symbicort  -cough syrup       VTE Prophylaxis:  Pharmacologic VTE  prophylaxis orders are present.    Disposition Planning:   Barriers to Discharge: Ongoing care  Anticipated Date of Discharge: Pending clinical course  Place of Discharge: Home    Time: > 50 minutes     Code Status and Medical Interventions: CPR (Attempt to Resuscitate); Full Support   Ordered at: 11/15/24 1925     Code Status (Patient has no pulse and is not breathing):    CPR (Attempt to Resuscitate)     Medical Interventions (Patient has pulse or is breathing):    Full Support       Signature: Electronically signed by Rashid Joyner MD, 24, 11:00 EST.  [facility] Hospitalist Team      Electronically signed by Rashid Joyner MD at 24 0656       Manju Graff MD at 24 1225          .    [facility] MEDICINE SERVICE  DAILY PROGRESS NOTE    NAME: Kenny Cruz  : 1965  MRN: [id number]      LOS: 0 days     PROVIDER OF SERVICE: Manju Graff MD    Chief Complaint: Atrial fibrillation with RVR    Subjective:     Interval History:  History taken from: patient    Seen and examined at bedside, resting comfortably with wife and daughter.  Discussed pathophysiology of A-fib at great length.  All question concerns addressed.        Review of Systems:   Review of Systems negative except as mentioned above    Objective:     Vital Signs  Temp:  [98.3 °F (36.8 °C)-99.3 °F (37.4 °C)] 99.3 °F (37.4 °C)  Heart Rate:  [] 107  Resp:  [18-20] 18  BP: (104-137)/(64-88) 111/72   Body mass index is 35.58 kg/m².    Physical Exam  Physical Exam  General: Awake, alert, NAD  HEENT: NC/AT, PERRL, EOMI, conjunctivae are clear, mucous membrane moist, oropharynx clear, Trachea midline   Cardiovascular: Regular rate and irregularly irregular rhythm, no murmurs  Respiratory: Clear to auscultation bilaterally, no wheezing or rales, unlabored breathing  Abdomen: Soft, nontender, positive bowel sounds, no guarding  Neurologic: A&O, CN grossly intact, moves all extremities  spontaneously  Musculoskeletal: Normal range of motion, no other gross deformities  Skin: Warm, dry        Diagnostic Data    Results from last 7 days   Lab Units 11/16/24  0554   WBC 10*3/mm3 10.06   HEMOGLOBIN g/dL 13.6   HEMATOCRIT % 41.2   PLATELETS 10*3/mm3 202   GLUCOSE mg/dL 118*   CREATININE mg/dL 1.05   BUN mg/dL 12   SODIUM mmol/L 138   POTASSIUM mmol/L 4.3   AST (SGOT) U/L 19   ALT (SGPT) U/L 12   ALK PHOS U/L 62   BILIRUBIN mg/dL 1.7*   ANION GAP mmol/L 10.0       No radiology results for the last day      I reviewed the patient's new clinical results.    Assessment/Plan:     Active and Resolved Problems  Active Hospital Problems    Diagnosis  POA    **Atrial fibrillation with RVR [I48.91]  Yes      Resolved Hospital Problems   No resolved problems to display.       New onset A-fib RVR  -Heart rate in the 150s initially, improved with Cardizem drip, continue to wean down as tolerated  -Lovenox twice daily for anticoagulation for now   -echo ordered  -BP cardiology consulted, appreciate involvement/input.     Hypertension   -controlled currently, hold home losartan/HCTZ  -Monitor, resume when able     Bronchitis, improved   -recently diagnosed, seems to be doing well, chest x-ray clear, no significant wheezing noted on exam   -Continue symptomatic treatment, DuoNebs as needed       VTE Prophylaxis:  Pharmacologic VTE prophylaxis orders are present.             Disposition Planning:     Barriers to Discharge: Ongoing care  Anticipated Date of Discharge: Pending clinical course  Place of Discharge: Home      Time: > 50 minutes     Code Status and Medical Interventions: CPR (Attempt to Resuscitate); Full Support   Ordered at: 11/15/24 1925     Code Status (Patient has no pulse and is not breathing):    CPR (Attempt to Resuscitate)     Medical Interventions (Patient has pulse or is breathing):    Full Support       Signature: Electronically signed by Manju Graff MD, 11/16/24, 12:25 FAINA.  [facility]  Hospitalist Team      Electronically signed by Manju Graff MD at 24 1227       Suzan Beyer MD at 24 3740              [facility] MEDICINE SERVICE  DAILY PROGRESS NOTE    NAME: Kenny Cruz  : 1965  MRN: [id number]      LOS: 0 days     PROVIDER OF SERVICE: Suzan Beyer MD    Chief Complaint: Atrial fibrillation with RVR    Subjective:     Interval History:    Patient seen and evaluated at bedside.   H&P, consults, labs and imaging reviewed patient went to see urgent care provider for acute bronchitis but did not get his antibiotic filled.  Still complaining of cough with sputum production.  Treatment plan discussed with patient. All questions addressed.     Review of Systems:   All 21 ROS were negative except mentioned above.    Objective:     Vital Signs  Temp:  [98.3 °F (36.8 °C)-99.3 °F (37.4 °C)] 99.3 °F (37.4 °C)  Heart Rate:  [] 107  Resp:  [18-20] 18  BP: (104-137)/(64-88) 111/72   Body mass index is 35.58 kg/m².    Physical Exam   General: No acute distress, appears stated age  Neuro: Awake and alert, oriented x3, no focal deficits appreciated  Head: atraumatic, normocephalic  HEENT: EOMI, anicteric, normal sclera and conjunctivae, moist mucus membranes  Neck: supple, no lymphadenopathy  CV: IRR R tachycardia, soft heart sounds, no murmurs appreciated, no peripheral edema  Pulm: Decreased breath sounds, no increased work of breathing, no adventitious sounds  Abd: Soft, nontender, nondistended  Skin: Warm, dry and intact  Psych: Appropriate mood and affect    Scheduled Meds   apixaban, 5 mg, Oral, Q12H  dilTIAZem, 60 mg, Oral, Q8H  dronedarone, 400 mg, Oral, BID With Meals  guaiFENesin, 600 mg, Oral, Q12H  sodium chloride, 10 mL, Intravenous, Q12H       PRN Meds     acetaminophen **OR** acetaminophen **OR** acetaminophen    senna-docusate sodium **AND** polyethylene glycol **AND** bisacodyl **AND** bisacodyl    Calcium Replacement - Follow Nurse / BPA Driven Protocol     influenza vaccine    ipratropium-albuterol    Magnesium Cardiology Dose Replacement - Follow Nurse / BPA Driven Protocol    nitroglycerin    ondansetron ODT **OR** ondansetron    Phosphorus Replacement - Follow Nurse / BPA Driven Protocol    Potassium Replacement - Follow Nurse / BPA Driven Protocol    [COMPLETED] Insert Peripheral IV **AND** sodium chloride    sodium chloride    sodium chloride   Infusions         Diagnostic Data    Results from last 7 days   Lab Units 11/16/24  0554   WBC 10*3/mm3 10.06   HEMOGLOBIN g/dL 13.6   HEMATOCRIT % 41.2   PLATELETS 10*3/mm3 202   GLUCOSE mg/dL 118*   CREATININE mg/dL 1.05   BUN mg/dL 12   SODIUM mmol/L 138   POTASSIUM mmol/L 4.3   AST (SGOT) U/L 19   ALT (SGPT) U/L 12   ALK PHOS U/L 62   BILIRUBIN mg/dL 1.7*   ANION GAP mmol/L 10.0       No radiology results for the last day    Interval results reviewed.    Assessment/Plan:     New onset atrial flutter  Acute bronchitis  Hypertension    Cardiology following the patient, 2D echo pending.  Labs pending  Patient on Cardizem IV started on oral calcium channel blocker and donator on by cardiology.  Cardiology also started patient on Eliquis.    Continue Cardizem for blood pressure.    Will start patient on Amoxil 875 twice daily for bronchitis.      Treatment plan discussed with RN.     VTE Prophylaxis:  Pharmacologic VTE prophylaxis orders are present.         Code status is   Code Status and Medical Interventions: CPR (Attempt to Resuscitate); Full Support   Ordered at: 11/15/24 1925     Code Status (Patient has no pulse and is not breathing):    CPR (Attempt to Resuscitate)     Medical Interventions (Patient has pulse or is breathing):    Full Support       Plan for disposition:     Barriers to Discharge: Atrial fibrillation with RVR  Anticipated Date of Discharge: 11/17/2024  Place of Discharge: Home      Time: 40 minutes     Signature: Electronically signed by Suzan Beyer MD, 11/16/24, 11:40 EST.  [facility]  Hospitalist Team     Electronically signed by Suzan Beyer MD at 11/16/24 1144          Consult Notes (last 72 hours)        Angelito Garcia MD at 11/16/24 0812          CC--- palpitations    Sub  59-year-old male patient complains of mild cough since last few days and with productive phlegm and also noticed increased heart rate with palpitations.  Patient was seen and was found to have atrial arrhythmia and started on intravenous Cardizem and cardiology consultation requested.  No prior history of arrhythmias.  Patient denies any chest pain no dizziness or syncope.  Denies any alcohol use or thyroid abnormalities in the past.  No history of stroke.  He is currently comfortable with intravenous Cardizem and atrial arrhythmia rate is fairly well-controlled.  Patient has prior history of hypertension        Past Medical History:   Diagnosis Date    Hypertension      History reviewed. No pertinent surgical history.  Family History   Problem Relation Age of Onset    Valvular heart disease Mother     Other Mother         pacemaker    Coronary artery disease Father         CABG x 2     Diabetes Sister     Obesity Sister      Social History     Tobacco Use    Smoking status: Never     Passive exposure: Never    Smokeless tobacco: Never   Vaping Use    Vaping status: Never Used   Substance Use Topics    Alcohol use: Yes     Comment: rare    Drug use: No     Medications Prior to Admission   Medication Sig Dispense Refill Last Dose/Taking    acetaminophen (TYLENOL) 500 MG tablet Take 1 tablet by mouth Every 6 (Six) Hours As Needed for Mild Pain.   11/15/2024 at 11:00 AM    losartan-hydrochlorothiazide (HYZAAR) 50-12.5 MG per tablet TAKE 1 TABLET DAILY 90 tablet 3 11/15/2024 at 10:00 AM    albuterol sulfate  (90 Base) MCG/ACT inhaler Inhale 2 puffs Every 4 (Four) Hours As Needed for Wheezing or Shortness of Air. (Patient not taking: Reported on 11/15/2024) 8 g 0 Not Taking    benzonatate (TESSALON) 200 MG capsule  Take 1 capsule by mouth 3 (Three) Times a Day As Needed for Cough. (Patient not taking: Reported on 11/15/2024) 30 capsule 0     predniSONE (DELTASONE) 10 MG (21) dose pack Take  by mouth Daily for 6 days. Use as directed on package (Patient not taking: Reported on 11/15/2024) 21 tablet 0      Allergies:  Patient has no known allergies.    Review of Systems   General:  positive for fatigue and tiredness  Eyes: No redness  Cardiovascular: No chest pain      Physical Exam    General:      well developed, well nourished, in no acute distress.    Head:      normocephalic and atraumatic.    Eyes:      PERRL/EOM intact, conjunctivae and sclerae clear without nystagmus.    Neck:      no  thyromegaly, trachea central with normal respiratory effort  Lungs:      clear bilaterally to auscultation.    Heart:       irregular rate and rhythm, S1, S2 without murmurs, rubs, or gallops  Skin:      intact without lesions or rashes.    Psych:      alert and cooperative; normal mood and affect; normal attention span and concentration.            CBC    Results from last 7 days   Lab Units 11/16/24  0554 11/15/24  1703   WBC 10*3/mm3 10.06 11.22*   HEMOGLOBIN g/dL 13.6 14.2   PLATELETS 10*3/mm3 202 215     BMP   Results from last 7 days   Lab Units 11/16/24  0554 11/15/24  1703   SODIUM mmol/L 138 138   POTASSIUM mmol/L 4.3 3.9   CHLORIDE mmol/L 102 101   CO2 mmol/L 26.0 25.7   BUN mg/dL 12 11   CREATININE mg/dL 1.05 1.15   GLUCOSE mg/dL 118* 103*   MAGNESIUM mg/dL 2.0 2.0   PHOSPHORUS mg/dL 2.5  --      CMP   Results from last 7 days   Lab Units 11/16/24  0554 11/15/24  1703   SODIUM mmol/L 138 138   POTASSIUM mmol/L 4.3 3.9   CHLORIDE mmol/L 102 101   CO2 mmol/L 26.0 25.7   BUN mg/dL 12 11   CREATININE mg/dL 1.05 1.15   GLUCOSE mg/dL 118* 103*   ALBUMIN g/dL 4.0  --    BILIRUBIN mg/dL 1.7*  --    ALK PHOS U/L 62  --    AST (SGOT) U/L 19  --    ALT (SGPT) U/L 12  --          Assessment and plan    New onset classical atrial  flutter  Check echocardiography  Check D-dimer, BNP  Intravenous Cardizem  Start oral Cardizem  Start oral dronedarone   Options for recurrent atrial flutter like medical treatment need for ablation in future discussed with the patient  Stop Lovenox  Start Eliquis  Patient has symptoms of bronchitis to be evaluated by hospitalist    TSH is normal    Electronically signed by Angelito Garcia MD, 11/16/24, 8:16 AM EST.      Electronically signed by Angelito Garcia MD at 11/16/24 0816

## 2024-11-18 NOTE — PROGRESS NOTES
Transitions of Care (test claim):    Patient insurance type: Commercial Insurance - they are eligible to use a monthly  discount card in the future  Is patient signed up for M2B? yes    Drug eliquis:   Covered/PA Required: Covered without PA  Copay: $30 for a 21 day supply. Insurance will pay for only 21 days at a time.  Is the medication eligible for a trial card/copay card? [facility]      For billing questions please call [facility] Pharmacist at ext: [phone]  For M2B questions please call [facility] at ext: [phone]     Teresa PennyD, BCPS

## 2024-11-18 NOTE — CASE MANAGEMENT/SOCIAL WORK
Discharge Planning Assessment   Shreyas     Patient Name: Kenny Cruz  MRN: [id number]  Today's Date: 11/18/2024    Admit Date: 11/15/2024    Plan: Anticipate routine home with family.   Discharge Needs Assessment       Row Name 11/18/24 1400       Living Environment    People in Home spouse    Name(s) of People in Home Spouse- Sharonda    Current Living Arrangements home    Potentially Unsafe Housing Conditions none    In the past 12 months has the electric, gas, oil, or water company threatened to shut off services in your home? No    Primary Care Provided by self    Provides Primary Care For no one    Family Caregiver if Needed spouse    Quality of Family Relationships helpful;involved;supportive    Able to Return to Prior Arrangements yes       Resource/Environmental Concerns    Resource/Environmental Concerns none    Transportation Concerns none       Transportation Needs    In the past 12 months, has lack of transportation kept you from medical appointments or from getting medications? no    In the past 12 months, has lack of transportation kept you from meetings, work, or from getting things needed for daily living? No       Food Insecurity    Within the past 12 months, you worried that your food would run out before you got the money to buy more. Never true    Within the past 12 months, the food you bought just didn't last and you didn't have money to get more. Never true       Transition Planning    Patient/Family Anticipates Transition to home with family    Patient/Family Anticipated Services at Transition none    Transportation Anticipated car, drives self;family or friend will provide       Discharge Needs Assessment    Readmission Within the Last 30 Days no previous admission in last 30 days    Equipment Currently Used at Home none    Concerns to be Addressed denies needs/concerns at this time    Anticipated Changes Related to Illness none    Equipment Needed After Discharge none    Provided Post Acute  Provider List? N/A                   Discharge Plan       Row Name 11/18/24 1400       Plan    Plan Anticipate routine home with family.    Patient/Family in Agreement with Plan yes    Plan Comments CM met with patient and family at bedside to discuss dc planning. PCP and pharmacy confirmed, reported no trouble affording food/medications, and declined needs at this time for any DME/HH/PT services. Patient confirmed that he works full-time and does not use any DME. DC Barriers: DELANEY cardioversion performed this morning, 11/18.                   Demographic Summary       Row Name 11/18/24 6266       General Information    Admission Type inpatient    Arrived From emergency department    Referral Source admission list    Reason for Consult discharge planning    Preferred Language English       Contact Information    Permission Granted to Share Info With                    Functional Status       Row Name 11/18/24 9401       Functional Status    Usual Activity Tolerance good    Current Activity Tolerance good       Functional Status, IADL    Medications independent    Meal Preparation independent    Housekeeping independent    Laundry independent    Shopping independent       Employment/    Employment Status employed full-time                Kya Sow RN     Office Phone: [phone]  Office Cell: [phone]

## 2024-11-18 NOTE — PROGRESS NOTES
[facility] MEDICINE SERVICE  DAILY PROGRESS NOTE    NAME: Kenny Cruz  : 1965  MRN: [id number]      LOS: 1 day     PROVIDER OF SERVICE: Elis Singh MD    Chief Complaint: Atrial fibrillation with RVR    Subjective:     Interval History:    Patient seen and evaluated at bedside. DELANEY with CV this morning. Tolerated well and now in NSR.    Treatment plan discussed with patient. All questions addressed.     Review of Systems:   Denies fevers, chills  Denies chest pain, edema  +cough, +congestion  Denies nausea, vomiting, diarrhea  Denies dysuria, hematuria    Objective:     Vital Signs  Temp:  [98.1 °F (36.7 °C)-101.1 °F (38.4 °C)] 98.6 °F (37 °C)  Heart Rate:  [] 90  Resp:  [12-28] 18  BP: ()/(61-81) 122/81  Flow (L/min) (Oxygen Therapy):  [10] 10   Body mass index is 34.91 kg/m².    Physical Exam   General: No acute distress, appears stated age  Neuro: Awake and alert, oriented x3, no focal deficits appreciated  HEENT: EOMI, moist mucus membranes  CV: RRR, no murmurs appreciated, no peripheral edema  Pulm: CTAB, upper airway congestion that clears with cough, no increased work of breathing  Abd: Soft, nontender, nondistended  Skin: Warm, dry and intact  Psych: Appropriate mood and affect    Scheduled Meds   amoxicillin, 875 mg, Oral, Q12H  apixaban, 5 mg, Oral, Q12H  budesonide-formoterol, 2 puff, Inhalation, BID - RT  dilTIAZem, 60 mg, Oral, Q8H  dronedarone, 400 mg, Oral, BID With Meals  gentamicin, 2 drop, Right Eye, 4x Daily  guaiFENesin, 600 mg, Oral, Q12H  ipratropium-albuterol, 3 mL, Nebulization, Q6H While Awake - RT  predniSONE, 30 mg, Oral, Daily   Followed by  [START ON 2024] predniSONE, 20 mg, Oral, Daily   Followed by  [START ON 2024] predniSONE, 10 mg, Oral, Daily  sodium chloride, 10 mL, Intravenous, Q12H       PRN Meds     acetaminophen **OR** acetaminophen **OR** acetaminophen    senna-docusate sodium **AND** polyethylene glycol **AND** bisacodyl **AND**  bisacodyl    Calcium Replacement - Follow Nurse / BPA Driven Protocol    guaiFENesin-codeine    influenza vaccine    Magnesium Cardiology Dose Replacement - Follow Nurse / BPA Driven Protocol    nitroglycerin    ondansetron ODT **OR** ondansetron    Phosphorus Replacement - Follow Nurse / BPA Driven Protocol    Potassium Replacement - Follow Nurse / BPA Driven Protocol    [COMPLETED] Insert Peripheral IV **AND** sodium chloride    sodium chloride    sodium chloride   Infusions         Diagnostic Data    Results from last 7 days   Lab Units 11/18/24  0435 11/17/24  1003 11/16/24  0554   WBC 10*3/mm3 10.25   < > 10.06   HEMOGLOBIN g/dL 12.3*   < > 13.6   HEMATOCRIT % 37.5   < > 41.2   PLATELETS 10*3/mm3 221   < > 202   GLUCOSE mg/dL 99   < > 118*   CREATININE mg/dL 1.31*   < > 1.05   BUN mg/dL 23*   < > 12   SODIUM mmol/L 134*   < > 138   POTASSIUM mmol/L 3.9   < > 4.3   AST (SGOT) U/L  --   --  19   ALT (SGPT) U/L  --   --  12   ALK PHOS U/L  --   --  62   BILIRUBIN mg/dL  --   --  1.7*   ANION GAP mmol/L 10.2   < > 10.0    < > = values in this interval not displayed.       No radiology results for the last day    Interval results reviewed.    Assessment/Plan:     New onset atrial flutter  - Cardiology consulted, appreciate recs  - S/p DELANEY w/ CV, in NSR  - Continue eliquis, dronedarone  - Per notes, may require ablation in the future    Acute bronchitis  Acute conjunctivitis  - Continue ammox, gentamicin drops, steroids  - Symptomatic treatment    Hypertension  - Controlled at this time, continue to monitor    Treatment plan discussed with nursing staff, case management.     VTE Prophylaxis:  Pharmacologic VTE prophylaxis orders are present.    Code status is   Code Status and Medical Interventions: CPR (Attempt to Resuscitate); Full Support   Ordered at: 11/15/24 1925     Code Status (Patient has no pulse and is not breathing):    CPR (Attempt to Resuscitate)     Medical Interventions (Patient has pulse or is  breathing):    Full Support       Plan for disposition: Home 11/19/24    Barriers to discharge: Cardiology following, monitoring on tele following CV    Time: 35+ minutes     Signature: Electronically signed by Elis Singh MD, 11/18/24, 12:00 EST.  [facility] Hospitalist Team

## 2024-11-18 NOTE — ANESTHESIA PREPROCEDURE EVALUATION
Anesthesia Evaluation     Patient summary reviewed   no history of anesthetic complications:   NPO Solid Status: > 8 hours  NPO Liquid Status: > 8 hours           Airway   Mallampati: II  TM distance: >3 FB  Neck ROM: full  Dental - normal exam     Pulmonary - normal exam   (-) not a smoker  Cardiovascular - normal exam    ECG reviewed    (+) hypertension, dysrhythmias Atrial Fib      Neuro/Psych- negative ROS  GI/Hepatic/Renal/Endo    (+) obesity    Musculoskeletal     Abdominal    Substance History   (-) alcohol use     OB/GYN          Other                      Anesthesia Plan    ASA 3     general   total IV anesthesia  intravenous induction     Anesthetic plan, risks, benefits, and alternatives have been provided, discussed and informed consent has been obtained with: patient.    Plan discussed with CRNA.    CODE STATUS:    Code Status (Patient has no pulse and is not breathing): CPR (Attempt to Resuscitate)  Medical Interventions (Patient has pulse or is breathing): Full Support

## 2024-11-18 NOTE — PLAN OF CARE
Goal Outcome Evaluation:              Outcome Evaluation: Patient currently resting abed, no distress noted. Patient had a breathing treatment during the evening and his heart rate remained in the 140s for some time after. Patient's heart rate 80-100s while asleep or resting. Patient makes and movement and his heart rate goes 110 - 150s. Patient currently NPO in preporation for DELANEY today.

## 2024-11-18 NOTE — ANESTHESIA POSTPROCEDURE EVALUATION
Patient: Kenny Cruz    Procedure Summary       Date: 11/18/24 Room / Location: [facility]    Anesthesia Start: 1002 Anesthesia Stop: 1043    Procedures:       CARDIOVERSION EXTERNAL IN CARDIOLOGY DEPARTMENT      ADULT TRANSESOPHAGEAL ECHO (DELANEY) W/ CONT IF NECESSARY PER PROTOCOL Diagnosis:       (atrial flutter)      (Arrhythmia)    Scheduled Providers: Dajuan De Leon MD Provider: Carol Ann Ng MD    Anesthesia Type: general ASA Status: 3            Anesthesia Type: general    Vitals  Vitals Value Taken Time   /81 11/18/24 1116   Temp     Pulse 71 11/18/24 1121   Resp 18 11/18/24 1055   SpO2 95 % 11/18/24 1121   Vitals shown include unfiled device data.        Post Anesthesia Care and Evaluation    Patient location during evaluation: PACU  Patient participation: complete - patient participated  Level of consciousness: awake and alert  Pain management: satisfactory to patient    Airway patency: patent  Anesthetic complications: No anesthetic complications  PONV Status: none  Cardiovascular status: acceptable  Respiratory status: acceptable  Hydration status: acceptable

## 2024-11-19 ENCOUNTER — READMISSION MANAGEMENT (OUTPATIENT)
Dept: CALL CENTER | Facility: HOSPITAL | Age: 59
End: 2024-11-19
Payer: COMMERCIAL

## 2024-11-19 VITALS
HEIGHT: 72 IN | OXYGEN SATURATION: 97 % | DIASTOLIC BLOOD PRESSURE: 72 MMHG | RESPIRATION RATE: 18 BRPM | WEIGHT: 261 LBS | SYSTOLIC BLOOD PRESSURE: 129 MMHG | TEMPERATURE: 98.9 F | BODY MASS INDEX: 35.35 KG/M2 | HEART RATE: 85 BPM

## 2024-11-19 LAB
ANION GAP SERPL CALCULATED.3IONS-SCNC: 9.4 MMOL/L (ref 5–15)
BASOPHILS # BLD AUTO: 0.04 10*3/MM3 (ref 0–0.2)
BASOPHILS NFR BLD AUTO: 0.4 % (ref 0–1.5)
BUN SERPL-MCNC: 20 MG/DL (ref 6–20)
BUN/CREAT SERPL: 20.4 (ref 7–25)
CALCIUM SPEC-SCNC: 8.9 MG/DL (ref 8.6–10.5)
CHLORIDE SERPL-SCNC: 100 MMOL/L (ref 98–107)
CO2 SERPL-SCNC: 25.6 MMOL/L (ref 22–29)
CREAT SERPL-MCNC: 0.98 MG/DL (ref 0.76–1.27)
DEPRECATED RDW RBC AUTO: 43.9 FL (ref 37–54)
EGFRCR SERPLBLD CKD-EPI 2021: 89.9 ML/MIN/1.73
EOSINOPHIL # BLD AUTO: 0.02 10*3/MM3 (ref 0–0.4)
EOSINOPHIL NFR BLD AUTO: 0.2 % (ref 0.3–6.2)
ERYTHROCYTE [DISTWIDTH] IN BLOOD BY AUTOMATED COUNT: 13.2 % (ref 12.3–15.4)
GLUCOSE SERPL-MCNC: 122 MG/DL (ref 65–99)
HCT VFR BLD AUTO: 35.6 % (ref 37.5–51)
HGB BLD-MCNC: 12.1 G/DL (ref 13–17.7)
IMM GRANULOCYTES # BLD AUTO: 0.15 10*3/MM3 (ref 0–0.05)
IMM GRANULOCYTES NFR BLD AUTO: 1.4 % (ref 0–0.5)
LYMPHOCYTES # BLD AUTO: 1.3 10*3/MM3 (ref 0.7–3.1)
LYMPHOCYTES NFR BLD AUTO: 12.1 % (ref 19.6–45.3)
MCH RBC QN AUTO: 30.6 PG (ref 26.6–33)
MCHC RBC AUTO-ENTMCNC: 34 G/DL (ref 31.5–35.7)
MCV RBC AUTO: 89.9 FL (ref 79–97)
MONOCYTES # BLD AUTO: 1.39 10*3/MM3 (ref 0.1–0.9)
MONOCYTES NFR BLD AUTO: 13 % (ref 5–12)
NEUTROPHILS NFR BLD AUTO: 7.82 10*3/MM3 (ref 1.7–7)
NEUTROPHILS NFR BLD AUTO: 72.9 % (ref 42.7–76)
NRBC BLD AUTO-RTO: 0 /100 WBC (ref 0–0.2)
PLATELET # BLD AUTO: 236 10*3/MM3 (ref 140–450)
PMV BLD AUTO: 11 FL (ref 6–12)
POTASSIUM SERPL-SCNC: 4.4 MMOL/L (ref 3.5–5.2)
RBC # BLD AUTO: 3.96 10*6/MM3 (ref 4.14–5.8)
SODIUM SERPL-SCNC: 135 MMOL/L (ref 136–145)
WBC NRBC COR # BLD AUTO: 10.72 10*3/MM3 (ref 3.4–10.8)

## 2024-11-19 PROCEDURE — 99232 SBSQ HOSP IP/OBS MODERATE 35: CPT | Performed by: INTERNAL MEDICINE

## 2024-11-19 PROCEDURE — 94799 UNLISTED PULMONARY SVC/PX: CPT

## 2024-11-19 PROCEDURE — 90656 IIV3 VACC NO PRSV 0.5 ML IM: CPT | Performed by: INTERNAL MEDICINE

## 2024-11-19 PROCEDURE — 85025 COMPLETE CBC W/AUTO DIFF WBC: CPT | Performed by: INTERNAL MEDICINE

## 2024-11-19 PROCEDURE — G0008 ADMIN INFLUENZA VIRUS VAC: HCPCS | Performed by: INTERNAL MEDICINE

## 2024-11-19 PROCEDURE — 25010000002 INFLUENZA VIRUS VACC SPLIT PF 0.5 ML SUSPENSION PREFILLED SYRINGE: Performed by: INTERNAL MEDICINE

## 2024-11-19 PROCEDURE — 94664 DEMO&/EVAL PT USE INHALER: CPT

## 2024-11-19 PROCEDURE — 63710000001 PREDNISONE PER 1 MG: Performed by: INTERNAL MEDICINE

## 2024-11-19 PROCEDURE — 80048 BASIC METABOLIC PNL TOTAL CA: CPT | Performed by: INTERNAL MEDICINE

## 2024-11-19 PROCEDURE — 94761 N-INVAS EAR/PLS OXIMETRY MLT: CPT

## 2024-11-19 RX ORDER — PREDNISONE 10 MG/1
TABLET ORAL
Qty: 4 TABLET | Refills: 0 | Status: SHIPPED | OUTPATIENT
Start: 2024-11-20 | End: 2024-11-21

## 2024-11-19 RX ORDER — GENTAMICIN SULFATE 3 MG/ML
2 SOLUTION/ DROPS OPHTHALMIC 4 TIMES DAILY
Qty: 5 ML | Refills: 0 | Status: SHIPPED | OUTPATIENT
Start: 2024-11-19 | End: 2024-11-21

## 2024-11-19 RX ORDER — METOPROLOL SUCCINATE 25 MG/1
25 TABLET, EXTENDED RELEASE ORAL
Qty: 30 TABLET | Refills: 2 | Status: SHIPPED | OUTPATIENT
Start: 2024-11-19 | End: 2025-02-17

## 2024-11-19 RX ORDER — GUAIFENESIN 600 MG/1
600 TABLET, EXTENDED RELEASE ORAL EVERY 12 HOURS SCHEDULED
Qty: 14 TABLET | Refills: 0 | Status: SHIPPED | OUTPATIENT
Start: 2024-11-19 | End: 2024-11-21

## 2024-11-19 RX ORDER — AMOXICILLIN 875 MG/1
875 TABLET, COATED ORAL EVERY 12 HOURS SCHEDULED
Qty: 3 TABLET | Refills: 0 | Status: SHIPPED | OUTPATIENT
Start: 2024-11-19 | End: 2024-11-21

## 2024-11-19 RX ORDER — METOPROLOL SUCCINATE 25 MG/1
25 TABLET, EXTENDED RELEASE ORAL
Status: DISCONTINUED | OUTPATIENT
Start: 2024-11-19 | End: 2024-11-19 | Stop reason: HOSPADM

## 2024-11-19 RX ADMIN — GENTAMICIN SULFATE 2 DROP: 3 SOLUTION OPHTHALMIC at 09:16

## 2024-11-19 RX ADMIN — IPRATROPIUM BROMIDE AND ALBUTEROL SULFATE 3 ML: .5; 3 SOLUTION RESPIRATORY (INHALATION) at 11:53

## 2024-11-19 RX ADMIN — GUAIFENESIN 600 MG: 600 TABLET, EXTENDED RELEASE ORAL at 09:16

## 2024-11-19 RX ADMIN — PREDNISONE 20 MG: 20 TABLET ORAL at 09:16

## 2024-11-19 RX ADMIN — AMOXICILLIN 875 MG: 875 TABLET, FILM COATED ORAL at 09:16

## 2024-11-19 RX ADMIN — APIXABAN 5 MG: 5 TABLET, FILM COATED ORAL at 09:16

## 2024-11-19 RX ADMIN — ACETAMINOPHEN 650 MG: 325 TABLET, FILM COATED ORAL at 09:21

## 2024-11-19 RX ADMIN — GUAIFENESIN AND CODEINE PHOSPHATE 5 ML: 100; 10 SOLUTION ORAL at 09:21

## 2024-11-19 RX ADMIN — Medication 10 ML: at 09:16

## 2024-11-19 RX ADMIN — INFLUENZA A VIRUS A/VICTORIA/4897/2022 IVR-238 (H1N1) ANTIGEN (FORMALDEHYDE INACTIVATED), INFLUENZA A VIRUS A/CALIFORNIA/122/2022 SAN-022 (H3N2) ANTIGEN (FORMALDEHYDE INACTIVATED), AND INFLUENZA B VIRUS B/MICHIGAN/01/2021 ANTIGEN (FORMALDEHYDE INACTIVATED) 0.5 ML: 15; 15; 15 INJECTION, SUSPENSION INTRAMUSCULAR at 13:40

## 2024-11-19 RX ADMIN — DRONEDARONE 400 MG: 400 TABLET, FILM COATED ORAL at 09:16

## 2024-11-19 RX ADMIN — GENTAMICIN SULFATE 2 DROP: 3 SOLUTION OPHTHALMIC at 13:41

## 2024-11-19 NOTE — CASE MANAGEMENT/SOCIAL WORK
Case Management Discharge Note             Selected Continued Care - Discharged on 11/19/2024 Admission date: 11/15/2024 - Discharge disposition: Home or Self Care          Transportation Services  Private: Car    Final Discharge Disposition Code: 01 - home or self-care

## 2024-11-19 NOTE — PAYOR COMM NOTE
"This is clinical for Kenny Cruz.  Reference/Auth#: [id number]     EXTENDED AUTHORIZATION PENDING:     Please call or fax determination to contact below.   Thank you.    Joana Wolf RN, CCM  Utilization Review Nurse  [address] [address]   [phone]  Fx [phone]         Kenny Cruz (59 y.o. Male)       Date of Birth   1965    Social Security Number       Address   [address]  [place] [address]    Home Phone   [phone]    MRN   [id number]       Lutheran   Denominational    Marital Status                               Admission Date   11/15/24    Admission Type   Emergency    Admitting Provider       Attending Provider   Elis Singh MD    Department, Room/Bed   [facility] CARE, 2132/1       Discharge Date       Discharge Disposition       Discharge Destination                                 Attending Provider: Elis Singh MD    Allergies: No Known Allergies    Isolation: None   Infection: None   Code Status: CPR    Ht: 182.9 cm (72\")   Wt: 118 kg (261 lb)    Admission Cmt: None   Principal Problem: Atrial fibrillation with RVR [I48.91]                   Active Insurance as of 11/15/2024       Primary Coverage       Payor Plan Insurance Group Employer/Plan Group    ANTHEM BLUE CROSS [facility] [employer] CROSS BLUE SHIELD PPO [id number]       Payor Plan Address Payor Plan Phone Number Payor Plan Fax Number Effective Dates    [address] [phone]  1/1/2018 - None Entered    [place] [address]         Subscriber Name Subscriber Birth Date Member ID       KENNY CRUZ 1965 [id number]                     Emergency Contacts        (Rel.) Home Phone Work Phone Mobile Phone    TAMRA MINAYA (Spouse) -- -- [phone]              Operative/Procedure Notes (last 24 hours)  Notes from 11/18/24 1032 through 11/19/24 1032   No notes of this type exist for this encounter.        "   Physician Progress Notes (last 24 hours)        Elis Singh MD at [address] [place] [address] MEDICINE SERVICE  DAILY PROGRESS NOTE    NAME: Kenny Cruz  : 1965  MRN: [id number]      LOS: 1 day     PROVIDER OF SERVICE: Elis Singh MD    Chief Complaint: Atrial fibrillation with RVR    Subjective:     Interval History:    Patient seen and evaluated at bedside. DELANEY with CV this morning. Tolerated well and now in NSR.    Treatment plan discussed with patient. All questions addressed.     Review of Systems:   Denies fevers, chills  Denies chest pain, edema  +cough, +congestion  Denies nausea, vomiting, diarrhea  Denies dysuria, hematuria    Objective:     Vital Signs  Temp:  [98.1 °F (36.7 °C)-101.1 °F (38.4 °C)] 98.6 °F (37 °C)  Heart Rate:  [] 90  Resp:  [12-28] 18  BP: ()/(61-81) 122/81  Flow (L/min) (Oxygen Therapy):  [10] 10   Body mass index is 34.91 kg/m².    Physical Exam   General: No acute distress, appears stated age  Neuro: Awake and alert, oriented x3, no focal deficits appreciated  HEENT: EOMI, moist mucus membranes  CV: RRR, no murmurs appreciated, no peripheral edema  Pulm: CTAB, upper airway congestion that clears with cough, no increased work of breathing  Abd: Soft, nontender, nondistended  Skin: Warm, dry and intact  Psych: Appropriate mood and affect    Scheduled Meds   amoxicillin, 875 mg, Oral, Q12H  apixaban, 5 mg, Oral, Q12H  budesonide-formoterol, 2 puff, Inhalation, BID - RT  dilTIAZem, 60 mg, Oral, Q8H  dronedarone, 400 mg, Oral, BID With Meals  gentamicin, 2 drop, Right Eye, 4x Daily  guaiFENesin, 600 mg, Oral, Q12H  ipratropium-albuterol, 3 mL, Nebulization, Q6H While Awake - RT  predniSONE, 30 mg, Oral, Daily   Followed by  [START ON 2024] predniSONE, 20 mg, Oral, Daily   Followed by  [START ON 2024] predniSONE, 10 mg, Oral, Daily  sodium chloride, 10 mL, Intravenous, Q12H       PRN Meds     acetaminophen **OR** acetaminophen  **OR** acetaminophen    senna-docusate sodium **AND** polyethylene glycol **AND** bisacodyl **AND** bisacodyl    Calcium Replacement - Follow Nurse / BPA Driven Protocol    guaiFENesin-codeine    influenza vaccine    Magnesium Cardiology Dose Replacement - Follow Nurse / BPA Driven Protocol    nitroglycerin    ondansetron ODT **OR** ondansetron    Phosphorus Replacement - Follow Nurse / BPA Driven Protocol    Potassium Replacement - Follow Nurse / BPA Driven Protocol    [COMPLETED] Insert Peripheral IV **AND** sodium chloride    sodium chloride    sodium chloride   Infusions         Diagnostic Data    Results from last 7 days   Lab Units 11/18/24  0435 11/17/24  1003 11/16/24  0554   WBC 10*3/mm3 10.25   < > 10.06   HEMOGLOBIN g/dL 12.3*   < > 13.6   HEMATOCRIT % 37.5   < > 41.2   PLATELETS 10*3/mm3 221   < > 202   GLUCOSE mg/dL 99   < > 118*   CREATININE mg/dL 1.31*   < > 1.05   BUN mg/dL 23*   < > 12   SODIUM mmol/L 134*   < > 138   POTASSIUM mmol/L 3.9   < > 4.3   AST (SGOT) U/L  --   --  19   ALT (SGPT) U/L  --   --  12   ALK PHOS U/L  --   --  62   BILIRUBIN mg/dL  --   --  1.7*   ANION GAP mmol/L 10.2   < > 10.0    < > = values in this interval not displayed.       No radiology results for the last day    Interval results reviewed.    Assessment/Plan:     New onset atrial flutter  - Cardiology consulted, appreciate recs  - S/p DELANEY w/ CV, in NSR  - Continue eliquis, dronedarone  - Per notes, may require ablation in the future    Acute bronchitis  Acute conjunctivitis  - Continue ammox, gentamicin drops, steroids  - Symptomatic treatment    Hypertension  - Controlled at this time, continue to monitor    Treatment plan discussed with nursing staff, case management.     VTE Prophylaxis:  Pharmacologic VTE prophylaxis orders are present.    Code status is   Code Status and Medical Interventions: CPR (Attempt to Resuscitate); Full Support   Ordered at: 11/15/24 1925     Code Status (Patient has no pulse and is not  breathing):    CPR (Attempt to Resuscitate)     Medical Interventions (Patient has pulse or is breathing):    Full Support       Plan for disposition: Home 11/19/24    Barriers to discharge: Cardiology following, monitoring on tele following CV    Time: 35+ minutes     Signature: Electronically signed by Elis Singh MD, 11/18/24, 12:00 EST.  [facility]      Electronically signed by Elis Singh MD at 11/18/24 1719       Consult Notes (last 24 hours)  Notes from 11/18/24 1032 through 11/19/24 1032   No notes of this type exist for this encounter.

## 2024-11-19 NOTE — PROGRESS NOTES
[facility] CARDIOLOGY  [facility]        LOS:  LOS: 2 days   Patient Name: Kenny Cruz  Age/Sex: 59 y.o. male  : 1965  MRN: [id number]    Day of Service: 24   Length of Stay: 2  Encounter Provider: UMESH Green  Place of Service: [facility] CARDIOLOGY  Patient Care Team:  Cherelle Bernabe APRN as PCP - General (Nurse Practitioner)    Subjective:     Chief Complaint:  F/U AF    Subjective:   Patient coughing and complaining of some exertional shortness of breath.  He has been up walking to the bathroom okay.  Denies dizziness.    Current Medications:   Scheduled Meds:amoxicillin, 875 mg, Oral, Q12H  apixaban, 5 mg, Oral, Q12H  budesonide-formoterol, 2 puff, Inhalation, BID - RT  dronedarone, 400 mg, Oral, BID With Meals  gentamicin, 2 drop, Right Eye, 4x Daily  guaiFENesin, 600 mg, Oral, Q12H  ipratropium-albuterol, 3 mL, Nebulization, Q6H While Awake - RT  predniSONE, 20 mg, Oral, Daily   Followed by  [START ON 2024] predniSONE, 10 mg, Oral, Daily  sodium chloride, 10 mL, Intravenous, Q12H      Continuous Infusions:     Allergies:  No Known Allergies    ROS    Objective:     Temp:  [98.1 °F (36.7 °C)-98.9 °F (37.2 °C)] 98.9 °F (37.2 °C)  Heart Rate:  [49-91] 85  Resp:  [13-26] 18  BP: (112-129)/(70-80) 129/72     Intake/Output Summary (Last 24 hours) at 2024 1218  Last data filed at 2024 0427  Gross per 24 hour   Intake 480 ml   Output 1050 ml   Net -570 ml     Body mass index is 35.4 kg/m².      24  0616 24  0841 24  0601   Weight: 117 kg (257 lb 6.4 oz) 117 kg (257 lb 6.2 oz) 118 kg (261 lb)         Physical Exam:  Neuro:  CV:  Resp:  GI:  Ext:  Tele: AAOx3, no gross deficits  S1S2 RRR, no murmur  Nonlabored, CTA  BS+, abd soft  Pedal pulses palp, mild non-pitting BLE edema  SR/SB                                                   Lab Review:   Results from last 7 days   Lab Units  "11/19/24  0435 11/18/24  0435 11/17/24  1003 11/16/24  0554   SODIUM mmol/L 135* 134*   < > 138   POTASSIUM mmol/L 4.4 3.9   < > 4.3   CHLORIDE mmol/L 100 97*   < > 102   CO2 mmol/L 25.6 26.8   < > 26.0   BUN mg/dL 20 23*   < > 12   CREATININE mg/dL 0.98 1.31*   < > 1.05   GLUCOSE mg/dL 122* 99   < > 118*   CALCIUM mg/dL 8.9 8.8   < > 9.0   AST (SGOT) U/L  --   --   --  19   ALT (SGPT) U/L  --   --   --  12    < > = values in this interval not displayed.         Results from last 7 days   Lab Units 11/19/24  0435 11/18/24  0435   WBC 10*3/mm3 10.72 10.25   HEMOGLOBIN g/dL 12.1* 12.3*   HEMATOCRIT % 35.6* 37.5   PLATELETS 10*3/mm3 236 221         Results from last 7 days   Lab Units 11/16/24  0554 11/15/24  1703   MAGNESIUM mg/dL 2.0 2.0           Invalid input(s): \"LDLCALC\"  Results from last 7 days   Lab Units 11/16/24  0554   PROBNP pg/mL 434.0     Results from last 7 days   Lab Units 11/15/24  1703   TSH uIU/mL 2.800       Recent Radiology:  Imaging Results (Most Recent)       None            ECHOCARDIOGRAM:    Results for orders placed during the hospital encounter of 11/15/24    Adult Transesophageal Echo (DELANEY) W/ Cont if Necessary Per Protocol (Cardiology Department)    Interpretation Summary    Left ventricular systolic function is normal. Left ventricular ejection fraction appears to be 56 - 60%.    Left ventricular wall thickness is consistent with mild concentric hypertrophy.    Left ventricular diastolic function was normal.    Saline test results are negative.        I reviewed the patient's new clinical results.    EKG:      Assessment:       Atrial fibrillation with RVR    Atrial flutter    1) Atrial flutter s/p cardioversion 11/18 --> SR  - DELANEY 11/18 showed EF 56-60% with mild MR  - on Multaq  - TSH WNL  - on Eliquis for anticoagulation    2) Acute bronchitis  - per primary team    3) Conjuctivitis    4) HTN      Plan:   Tele shows SR/SB down to 40s nocturnally and asymptomatic.  As d/w Dr. Garcia, " will stop Multaq and start low dose beta blocker.  Continue on Eliquis for anticoagulation.  F/u with Dr. Garcia in 4 weeks.  Patient will be considered for aflutter ablation as an outpatient.          Electronically signed by UMESH Green, 11/19/24, 12:29 PM EST.       Patient seen and examined.  Patient in sinus rhythm after cardioversion      Physical Exam    General:      well developed, well nourished, in no acute distress.    Head:      normocephalic and atraumatic.    Eyes:      PERRL/EOM intact, conjunctivae and sclerae clear without nystagmus.    Neck:      no  thyromegaly, trachea central with normal respiratory effort  Lungs:      clear bilaterally to auscultation.    Heart:       regular rate and rhythm, S1, S2 without murmurs, rubs, or gallops  Skin:      intact without lesions or rashes.    Psych:      alert and cooperative; normal mood and affect; normal attention span and concentration.        Patient can be discharged home on Eliquis for 4 weeks  Start low-dose of beta-blockers and follow-up in our office in 1 month      Electronically signed by Angelito Garcia MD, 11/19/24, 12:47 PM EST.

## 2024-11-19 NOTE — PLAN OF CARE
Goal Outcome Evaluation:              Outcome Evaluation: Patient currently resting abed, no distress noted. Patient alert and able to make needs known. Patient voiced no complaints. Patient's heart rate a couple times went into the 40, 50, and 60s. When his rate rate would go low his O2 would go into the 80s. It would recover quickly and patient showed no distress.

## 2024-11-19 NOTE — OUTREACH NOTE
Prep Survey      Flowsheet Row Responses   [facility] patient discharged from? [facility]   Is LACE score < 7 ? No   Eligibility [facility]   Date of Admission 11/15/24   Date of Discharge 11/19/24   Discharge Disposition Home or Self Care   Discharge diagnosis Atrial fibrillation with RVR   Does the patient have one of the following disease processes/diagnoses(primary or secondary)? Other   Does the patient have Home health ordered? No   Is there a DME ordered? No   Comments regarding appointments Follow-up with Specified Provider: Cardiology,  1 Month   Medication alerts for this patient see AVS   Prep survey completed? Yes            Tabatha RIVAS - Registered Nurse

## 2024-11-19 NOTE — PLAN OF CARE
Problem: Adult Inpatient Plan of Care  Goal: Plan of Care Review  Outcome: Met  Goal: Patient-Specific Goal (Individualized)  Outcome: Met  Goal: Absence of Hospital-Acquired Illness or Injury  Outcome: Met  Intervention: Identify and Manage Fall Risk  Recent Flowsheet Documentation  Taken 11/19/2024 1200 by Devi Cardona RN  Safety Promotion/Fall Prevention:   safety round/check completed   assistive device/personal items within reach   clutter free environment maintained  Taken 11/19/2024 1000 by Devi Cardona RN  Safety Promotion/Fall Prevention:   safety round/check completed   clutter free environment maintained   assistive device/personal items within reach  Taken 11/19/2024 0800 by Devi Cardona RN  Safety Promotion/Fall Prevention:   safety round/check completed   clutter free environment maintained   assistive device/personal items within reach  Intervention: Prevent Infection  Recent Flowsheet Documentation  Taken 11/19/2024 1200 by Devi Cardona RN  Infection Prevention: personal protective equipment utilized  Taken 11/19/2024 1000 by Devi Cardona RN  Infection Prevention: personal protective equipment utilized  Taken 11/19/2024 0800 by Devi Cardona RN  Infection Prevention: personal protective equipment utilized  Goal: Optimal Comfort and Wellbeing  Outcome: Met  Intervention: Provide Person-Centered Care  Recent Flowsheet Documentation  Taken 11/19/2024 1200 by Devi Cardona RN  Trust Relationship/Rapport:   care explained   choices provided  Taken 11/19/2024 0800 by Devi Cardona RN  Trust Relationship/Rapport:   choices provided   care explained   questions encouraged   reassurance provided   thoughts/feelings acknowledged  Goal: Readiness for Transition of Care  Outcome: Met   Goal Outcome Evaluation:

## 2024-11-20 ENCOUNTER — TRANSITIONAL CARE MANAGEMENT TELEPHONE ENCOUNTER (OUTPATIENT)
Dept: CALL CENTER | Facility: HOSPITAL | Age: 59
End: 2024-11-20
Payer: COMMERCIAL

## 2024-11-20 NOTE — PAYOR COMM NOTE
"This is discharge notification for Kenny Cruz   Reference/Auth # [id number]   Pt discharged on 24    Omayra Redman RN, BSN  Utilization Review Nurse  [facility]  Direct & confidential phone # [phone]  Fax # [phone]      Kenny Cruz (59 y.o. Male)       Date of Birth   1965    Social Security Number       Address   [address] [place] [address]  [place] [address]    Home Phone   [phone]    MRN   [id number]       [employer]   Mandaen    Marital Status                               Admission Date   11/15/24    Admission Type   Emergency    Admitting Provider       Attending Provider       Department, Room/Bed   [facility] PROGRESS CARE,        Discharge Date   2024    Discharge Disposition   Home or Self Care    Discharge Destination                                 Attending Provider: (none)   Allergies: No Known Allergies    Isolation: None   Infection: None   Code Status: Prior    Ht: 182.9 cm (72\")   Wt: 118 kg (261 lb)    Admission Cmt: None   Principal Problem: Atrial fibrillation with RVR [I48.91]                   Active Insurance as of 11/15/2024       Primary Coverage       Payor Plan Insurance Group Employer/Plan Group    [employer]HEM BLUE CROSS AN[employer]M BLUE CROSS BLUE [employer]IELD PPO [id number]       Payor Plan Address Payor Plan Phone Number Payor Plan Fax Number Effective Dates    [phone]  2018 - None Entered    [place] [address]         Subscriber Name Subscriber Birth Date Member ID       KENNY CRUZ 1965 [id number]                     Emergency Contacts        (Rel.) Home Phone Work Phone Mobile Phone    TAMRA MINAYA (Spouse) -- -- [phone]                 Discharge Summary        Elis Singh MD at 24 0828                       [facility] Medicine Services  Discharge Summary    Date of Service: 2024  Patient Name: Kenny Cruz  : 1965  MRN: [id number]    Date of " "Admission: 11/15/2024  Discharge Diagnosis: Atrial fibrillation with rapid ventricular response  Date of Discharge: 11/19/2024  Primary Care Physician: Cherelle Bernabe APRN    Presenting Problem:   Atrial fibrillation with rapid ventricular response [I48.91]  Atrial fibrillation with RVR [I48.91]  Atrial flutter [I48.92]    Active and Resolved Hospital Problems:  Active Hospital Problems    Diagnosis POA    **Atrial fibrillation with RVR [I48.91] Yes    Atrial flutter [I48.92] Yes      Resolved Hospital Problems   No resolved problems to display.         Hospital Course     HPI:  Per the H&P written by Brendon Cho, dated 11/15/2024:  \"Kenny Cruz is a 59 y.o. male with past medical history of hypertension who presented to [facility] on 11/15/2024 from urgent care with complaints of rapid heartbeat.  Patient states she has been dealing with cough and congestion recently and went to urgent care and was diagnosed with bronchitis. He was taking TheraFlu he took some last night today he noticed his heart was running fast. He had with the lightheadedness and dizziness. He went to urgent care and he was referred here for heart rate that is running in the 150s. He denies any chest pain neck arm jaw pain or shortness of breath.  Noted to be in A-fib RVR, new onset.  Started on Cardizem drip. \"    Hospital Course:  Patient admitted as above.  Cardiology consulted.  Patient initially started on Cardizem drip.  Underwent DELANEY with cardioversion on 11/18/2024 without complication.  Remained in normal sinus rhythm.  Started on metoprolol and Eliquis for atrial fibrillation.  During admission, also treated for bronchitis and acute conjunctivitis.  Given antibiotics and eyedrops at time of discharge.  Plans for discharge today with outpatient follow-up with PCP and cardiology as below.    DISCHARGE Follow Up Recommendations for labs and diagnostics:  - Follow-up with PCP within 2 weeks of discharge  - Recommend " outpatient sleep study as patient exhibited multiple signs of obstructive sleep apnea during admission  - Follow-up with cardiology within 2 weeks of discharge    Reasons For Change In Medications and Indications for New Medications:  Stop losartan/hydrochlorothiazide  Continue prednisone taper  Continue amoxicillin and gentamicin eyedrops  Continue Eliquis and metoprolol for atrial fibrillation    Day of Discharge     Vital Signs:  Temp:  [98.1 °F (36.7 °C)-98.9 °F (37.2 °C)] 98.9 °F (37.2 °C)  Heart Rate:  [49-90] 85  Resp:  [13-26] 18  BP: (112-129)/(70-80) 129/72    Physical Exam:  General: No acute distress, appears stated age  Neuro: Awake and alert, oriented x3, no focal deficits appreciated  HEENT: EOMI, moist mucus membranes  CV: RRR, no murmurs appreciated, no peripheral edema  Pulm: CTAB, upper airway congestion that clears with cough, no increased work of breathing  Abd: Soft, nontender, nondistended  Skin: Warm, dry and intact  Psych: Appropriate mood and affect    Pertinent  and/or Most Recent Results     LAB RESULTS:      Lab 11/19/24 0435 11/18/24 0435 11/17/24  1003 11/16/24  0922 11/16/24  0554 11/15/24  1703   WBC 10.72 10.25 10.15  --  10.06 11.22*   HEMOGLOBIN 12.1* 12.3* 12.8*  --  13.6 14.2   HEMATOCRIT 35.6* 37.5 37.9  --  41.2 42.3   PLATELETS 236 221 202  --  202 215   NEUTROS ABS 7.82* 7.21* 7.14*  --   --  7.54*   IMMATURE GRANS (ABS) 0.15* 0.05 0.04  --   --  0.03   LYMPHS ABS 1.30 1.28 1.41  --   --  1.67   MONOS ABS 1.39* 1.68* 1.43*  --   --  1.67*   EOS ABS 0.02 0.01 0.09  --   --  0.24   MCV 89.9 89.5 89.4  --  89.6 89.4   D DIMER QUANT  --   --   --  0.58*  --   --          Lab 11/19/24  0435 11/18/24  0435 11/17/24  1003 11/16/24  0554 11/15/24  1703   SODIUM 135* 134* 135* 138 138   POTASSIUM 4.4 3.9 3.9 4.3 3.9   CHLORIDE 100 97* 99 102 101   CO2 25.6 26.8 26.9 26.0 25.7   ANION GAP 9.4 10.2 9.1 10.0 11.3   BUN 20 23* 14 12 11   CREATININE 0.98 1.31* 1.12 1.05 1.15   EGFR 89.9  63.5 76.6 82.8 74.2   GLUCOSE 122* 99 113* 118* 103*   CALCIUM 8.9 8.8 8.5* 9.0 9.3   MAGNESIUM  --   --   --  2.0 2.0   PHOSPHORUS  --   --   --  2.5  --    TSH  --   --   --   --  2.800         Lab 11/16/24  0554   TOTAL PROTEIN 7.0   ALBUMIN 4.0   GLOBULIN 3.0   ALT (SGPT) 12   AST (SGOT) 19   BILIRUBIN 1.7*   ALK PHOS 62         Lab 11/16/24  0554   PROBNP 434.0                 Brief Urine Lab Results       None          Microbiology Results (last 10 days)       Procedure Component Value - Date/Time    Respiratory Panel PCR w/COVID-19(SARS-CoV-2) LISA/JIMMY/NATE/PAD/COR/WALI In-House, NP Swab in UTM/VTM, 2 HR TAT - Swab, Nasopharynx [[id number]]  (Normal) Collected: 11/17/24 1544    Lab Status: Final result Specimen: Swab from Nasopharynx Updated: 11/17/24 1656     ADENOVIRUS, PCR Not Detected     Coronavirus 229E Not Detected     Coronavirus HKU1 Not Detected     Coronavirus NL63 Not Detected     Coronavirus OC43 Not Detected     COVID19 Not Detected     Human Metapneumovirus Not Detected     Human Rhinovirus/Enterovirus Not Detected     Influenza A PCR Not Detected     Influenza B PCR Not Detected     Parainfluenza Virus 1 Not Detected     Parainfluenza Virus 2 Not Detected     Parainfluenza Virus 3 Not Detected     Parainfluenza Virus 4 Not Detected     RSV, PCR Not Detected     Bordetella pertussis pcr Not Detected     Bordetella parapertussis PCR Not Detected     Chlamydophila pneumoniae PCR Not Detected     Mycoplasma pneumo by PCR Not Detected    Narrative:      In the setting of a positive respiratory panel with a viral infection PLUS a negative procalcitonin without other underlying concern for bacterial infection, consider observing off antibiotics or discontinuation of antibiotics and continue supportive care. If the respiratory panel is positive for atypical bacterial infection (Bordetella pertussis, Chlamydophila pneumoniae, or Mycoplasma pneumoniae), consider antibiotic de-escalation to target atypical  bacterial infection.          XR Chest 2 View    Result Date: 11/13/2024  Impression: Impression: No acute intrathoracic finding. Electronically Signed: Rohit ALEXANDRIA Gomez  11/13/2024 6:29 PM EST  Workstation ID: [id number]       Results for orders placed during the hospital encounter of 11/15/24    Adult Transesophageal Echo (DELANEY) W/ Cont if Necessary Per Protocol (Cardiology Department)    Interpretation Summary    Left ventricular systolic function is normal. Left ventricular ejection fraction appears to be 56 - 60%.    Left ventricular wall thickness is consistent with mild concentric hypertrophy.    Left ventricular diastolic function was normal.    Saline test results are negative.      Labs Pending at Discharge:   Pending Results       None            Procedures Performed   DELANEY with cardioversion       Consults:   Consults       Date and Time Order Name Status Description    11/15/2024  8:03 PM Inpatient Cardiology Consult      11/15/2024  6:23 PM Hospitalist (on-call MD unless specified)              Discharge Details        Discharge Medications        New Medications        Instructions Start Date   amoxicillin 875 MG tablet  Commonly known as: AMOXIL   875 mg, Oral, Every 12 Hours Scheduled      Eliquis 5 MG tablet tablet  Generic drug: apixaban   5 mg, Oral, Every 12 Hours Scheduled      gentamicin 0.3 % ophthalmic solution  Commonly known as: GARAMYCIN   2 drops, Right Eye, 4 Times Daily      guaiFENesin 600 MG 12 hr tablet  Commonly known as: MUCINEX   600 mg, Oral, Every 12 Hours Scheduled      metoprolol succinate XL 25 MG 24 hr tablet  Commonly known as: TOPROL-XL   25 mg, Oral, Every 24 Hours Scheduled      predniSONE 10 MG tablet  Commonly known as: DELTASONE  Replaces: predniSONE 10 MG (21) dose pack   Take 2 tablets by mouth Daily for 1 day, THEN 1 tablet Daily for 2 days.   Start Date: November 20, 2024            Continue These Medications        Instructions Start Date   acetaminophen 500 MG  tablet  Commonly known as: TYLENOL   500 mg, Every 6 Hours PRN             Stop These Medications      losartan-hydrochlorothiazide 50-12.5 MG per tablet  Commonly known as: HYZAAR     predniSONE 10 MG (21) dose pack  Commonly known as: DELTASONE  Replaced by: predniSONE 10 MG tablet            ASK your doctor about these medications        Instructions Start Date   albuterol sulfate  (90 Base) MCG/ACT inhaler  Commonly known as: PROVENTIL HFA;VENTOLIN HFA;PROAIR HFA   2 puffs, Inhalation, Every 4 Hours PRN      benzonatate 200 MG capsule  Commonly known as: TESSALON   200 mg, Oral, 3 Times Daily PRN               No Known Allergies    Discharge Disposition:   Home    Diet:  Heart healthy    Discharge Activity:   As tolerated    CODE STATUS:  Code Status and Medical Interventions: CPR (Attempt to Resuscitate); Full Support   Ordered at: 11/15/24 1925     Code Status (Patient has no pulse and is not breathing):    CPR (Attempt to Resuscitate)     Medical Interventions (Patient has pulse or is breathing):    Full Support       Future Appointments   Date Time Provider Department Center   12/12/2024  9:30 AM [place]ito [place] [facility] [place]       Additional Instructions for the Follow-ups that You Need to Schedule       Discharge Follow-up with PCP   As directed       Currently Documented PCP:    Cherelle Bernabe APRN    PCP Phone Number:    [phone]     Follow Up Details: Follow up with PCP within 2 weeks of discharge        Discharge Follow-up with Specified Provider: Cardiology; 1 Month   As directed      To: Cardiology   Follow Up: 1 Month                Time spent on Discharge including face to face service:  >30 minutes    Signature: Electronically signed by Elis Singh MD, 11/19/24, 15:10 EST.  [facility] Hospitalist Team      Electronically signed by Elis Singh MD at 11/19/24 6202

## 2024-11-20 NOTE — OUTREACH NOTE
Call Center TCM Note      Flowsheet Row Responses   [facility] patient discharged from? [facility]   Does the patient have one of the following disease processes/diagnoses(primary or secondary)? Other   TCM attempt successful? Yes  [VR for Sharonda Thompson]   Call start time 1224   Call end time 1240   Discharge diagnosis Atrial fibrillation with RVR   Medication alerts for this patient Eliquis   Meds reviewed with patient/caregiver? Yes   Is the patient having any side effects they believe may be caused by any medication additions or changes? No   Does the patient have all medications ordered at discharge? Yes   Is the patient taking all medications as directed (includes completed medication regime)? Yes   Comments Hospital f/u 11/21/24@@230pm   Does the patient have an appointment with their PCP within 7-14 days of discharge? Yes   Has home health visited the patient within 72 hours of discharge? N/A   Psychosocial issues? No   Comments Made pt aware of need for f/u with OP sleep study   Did the patient receive a copy of their discharge instructions? Yes   Nursing interventions Reviewed instructions with patient   What is the patient's perception of their health status since discharge? Same  [pt still  with cough throughout the call, reports worse with talking and when he takes a deep breath.  Pt is taking meds as ordered.  Pt also w/new diagnosis of afib, aware to monitor for return of symptomatic rapid afib and seek care prn.]   Is the patient/caregiver able to teach back signs and symptoms related to disease process for when to call PCP? Yes   Is the patient/caregiver able to teach back signs and symptoms related to disease process for when to call 911? Yes   TCM call completed? Yes   Call end time 1240            Marsha Lynch RN    11/20/2024, 12:46 EST

## 2024-11-21 ENCOUNTER — OFFICE VISIT (OUTPATIENT)
Dept: FAMILY MEDICINE CLINIC | Facility: CLINIC | Age: 59
End: 2024-11-21
Payer: COMMERCIAL

## 2024-11-21 VITALS
SYSTOLIC BLOOD PRESSURE: 118 MMHG | HEIGHT: 72 IN | OXYGEN SATURATION: 95 % | DIASTOLIC BLOOD PRESSURE: 70 MMHG | BODY MASS INDEX: 35.21 KG/M2 | WEIGHT: 260 LBS | HEART RATE: 67 BPM | RESPIRATION RATE: 18 BRPM

## 2024-11-21 DIAGNOSIS — R06.83 SNORING: ICD-10-CM

## 2024-11-21 DIAGNOSIS — Z09 HOSPITAL DISCHARGE FOLLOW-UP: Primary | ICD-10-CM

## 2024-11-21 DIAGNOSIS — I48.91 ATRIAL FIBRILLATION WITH RVR: ICD-10-CM

## 2024-11-21 DIAGNOSIS — R40.0 HAS DAYTIME DROWSINESS: ICD-10-CM

## 2024-11-21 NOTE — PROGRESS NOTES
"Transitional Care Follow Up Visit  Subjective     Kenny Cruz is a 59 y.o. male who presents for a transitional care management visit.    Within 48 business hours after discharge our office contacted him via telephone to coordinate his care and needs.      I reviewed and discussed the details of that call along with the discharge summary, hospital problems, inpatient lab results, inpatient diagnostic studies, and consultation reports with Kenny.     Current outpatient and discharge medications have been reconciled for the patient.  Reviewed by: UMESH Nichols          11/19/2024     5:23 PM   Date of TCM Phone Call   [facility]   Date of Admission 11/15/2024   Date of Discharge 11/19/2024   Discharge Disposition Home or Self Care     Risk for Readmission (LACE) Score: 7 (11/19/2024  6:00 AM)      History of Present Illness   Course During Hospital Stay:  pt comes in today for follow up from recent hospital visit. Was seen in the office on 11/15 with racing heart. Was sent to ER and was found to be in Afib/flutter with RVR. Was started on cardizem gtt and had cardioversion. Also had DELANEY there.   Was discharged home on eliquis and metoprolol. He is feeling ok other than having extreme fatigue. He is set to see cardiology in a couple of weeks for follow up.      The following portions of the patient's history were reviewed and updated as appropriate: allergies, current medications, past family history, past medical history, past social history, past surgical history, and problem list.    Review of Systems    Objective   /70   Pulse 67   Resp 18   Ht 182.9 cm (72.01\")   Wt 118 kg (260 lb)   SpO2 95%   BMI 35.25 kg/m²   Physical Exam  Constitutional:       Appearance: He is well-developed.   Eyes:      Pupils: Pupils are equal, round, and reactive to light.   Cardiovascular:      Rate and Rhythm: Normal rate and regular rhythm.   Pulmonary:      Effort: Pulmonary effort is normal.    "   Breath sounds: Normal breath sounds.   Neurological:      Mental Status: He is alert and oriented to person, place, and time.         Assessment & Plan   Diagnoses and all orders for this visit:    1. Hospital discharge follow-up (Primary)  -     Ambulatory Referral to Sleep Medicine    2. Snoring  -     Ambulatory Referral to Sleep Medicine    3. Has daytime drowsiness  -     Ambulatory Referral to Sleep Medicine    4. Atrial fibrillation with RVR  -     Ambulatory Referral to Sleep Medicine      Reviewed hospital records  Cont meds  Follow up with EP  Referral to sleep medicine. Most likely has [id number]  During this office visit, we discussed the pertinent aspects of the visit and treatment recommendations. Pt verbalizes understanding. Follow up was discussed. Patient was given the opportunity to ask questions and discuss other concerns.

## 2024-12-03 ENCOUNTER — HOSPITAL ENCOUNTER (OUTPATIENT)
Facility: HOSPITAL | Age: 59
Setting detail: OBSERVATION
Discharge: HOME | End: 2024-12-04
Attending: INTERNAL MEDICINE | Admitting: INTERNAL MEDICINE
Payer: COMMERCIAL

## 2024-12-03 ENCOUNTER — APPOINTMENT (OUTPATIENT)
Dept: GENERAL RADIOLOGY | Facility: HOSPITAL | Age: 59
End: 2024-12-03
Payer: COMMERCIAL

## 2024-12-03 DIAGNOSIS — R00.0 RAPID HEART RATE: ICD-10-CM

## 2024-12-03 DIAGNOSIS — I48.92 ATRIAL FLUTTER, UNSPECIFIED TYPE: Primary | ICD-10-CM

## 2024-12-03 LAB
ALBUMIN SERPL-MCNC: 3.9 G/DL (ref 3.5–5.2)
ALBUMIN/GLOB SERPL: 1.4 G/DL
ALP SERPL-CCNC: 58 U/L (ref 39–117)
ALT SERPL W P-5'-P-CCNC: 33 U/L (ref 1–41)
ANION GAP SERPL CALCULATED.3IONS-SCNC: 9.8 MMOL/L (ref 5–15)
AST SERPL-CCNC: 20 U/L (ref 1–40)
BASOPHILS # BLD AUTO: 0.06 10*3/MM3 (ref 0–0.2)
BASOPHILS NFR BLD AUTO: 0.5 % (ref 0–1.5)
BILIRUB SERPL-MCNC: 0.3 MG/DL (ref 0–1.2)
BUN SERPL-MCNC: 20 MG/DL (ref 6–20)
BUN/CREAT SERPL: 19.8 (ref 7–25)
CALCIUM SPEC-SCNC: 9.5 MG/DL (ref 8.6–10.5)
CHLORIDE SERPL-SCNC: 99 MMOL/L (ref 98–107)
CHOLEST SERPL-MCNC: 142 MG/DL (ref 0–200)
CO2 SERPL-SCNC: 26.2 MMOL/L (ref 22–29)
CREAT SERPL-MCNC: 1.01 MG/DL (ref 0.76–1.27)
DEPRECATED RDW RBC AUTO: 46.7 FL (ref 37–54)
EGFRCR SERPLBLD CKD-EPI 2021: 86.7 ML/MIN/1.73
EOSINOPHIL # BLD AUTO: 0.23 10*3/MM3 (ref 0–0.4)
EOSINOPHIL NFR BLD AUTO: 2 % (ref 0.3–6.2)
ERYTHROCYTE [DISTWIDTH] IN BLOOD BY AUTOMATED COUNT: 14 % (ref 12.3–15.4)
GEN 5 1HR TROPONIN T REFLEX: 7 NG/L
GLOBULIN UR ELPH-MCNC: 2.8 GM/DL
GLUCOSE SERPL-MCNC: 119 MG/DL (ref 65–99)
HBA1C MFR BLD: 6.21 % (ref 4.8–5.6)
HCT VFR BLD AUTO: 41.7 % (ref 37.5–51)
HDLC SERPL-MCNC: 42 MG/DL (ref 40–60)
HGB BLD-MCNC: 13.2 G/DL (ref 13–17.7)
HOLD SPECIMEN: NORMAL
HOLD SPECIMEN: NORMAL
IMM GRANULOCYTES # BLD AUTO: 0.14 10*3/MM3 (ref 0–0.05)
IMM GRANULOCYTES NFR BLD AUTO: 1.2 % (ref 0–0.5)
INR PPP: 1.2 (ref 0.9–1.1)
LDLC SERPL CALC-MCNC: 77 MG/DL (ref 0–100)
LDLC/HDLC SERPL: 1.78 {RATIO}
LYMPHOCYTES # BLD AUTO: 2.02 10*3/MM3 (ref 0.7–3.1)
LYMPHOCYTES NFR BLD AUTO: 18 % (ref 19.6–45.3)
MCH RBC QN AUTO: 29 PG (ref 26.6–33)
MCHC RBC AUTO-ENTMCNC: 31.7 G/DL (ref 31.5–35.7)
MCV RBC AUTO: 91.6 FL (ref 79–97)
MONOCYTES # BLD AUTO: 1.01 10*3/MM3 (ref 0.1–0.9)
MONOCYTES NFR BLD AUTO: 9 % (ref 5–12)
NEUTROPHILS NFR BLD AUTO: 69.3 % (ref 42.7–76)
NEUTROPHILS NFR BLD AUTO: 7.77 10*3/MM3 (ref 1.7–7)
NRBC BLD AUTO-RTO: 0 /100 WBC (ref 0–0.2)
NT-PROBNP SERPL-MCNC: 1459 PG/ML (ref 0–900)
PLATELET # BLD AUTO: 374 10*3/MM3 (ref 140–450)
PMV BLD AUTO: 9.9 FL (ref 6–12)
POTASSIUM SERPL-SCNC: 4 MMOL/L (ref 3.5–5.2)
PROT SERPL-MCNC: 6.7 G/DL (ref 6–8.5)
PROTHROMBIN TIME: 15.2 SECONDS (ref 11.7–14.2)
QT INTERVAL: 285 MS
QTC INTERVAL: 446 MS
RBC # BLD AUTO: 4.55 10*6/MM3 (ref 4.14–5.8)
SODIUM SERPL-SCNC: 135 MMOL/L (ref 136–145)
TRIGL SERPL-MCNC: 126 MG/DL (ref 0–150)
TROPONIN T DELTA: 0 NG/L
TROPONIN T SERPL HS-MCNC: 7 NG/L
VLDLC SERPL-MCNC: 23 MG/DL (ref 5–40)
WBC NRBC COR # BLD AUTO: 11.23 10*3/MM3 (ref 3.4–10.8)
WHOLE BLOOD HOLD COAG: NORMAL
WHOLE BLOOD HOLD SPECIMEN: NORMAL

## 2024-12-03 PROCEDURE — 85610 PROTHROMBIN TIME: CPT

## 2024-12-03 PROCEDURE — 84484 ASSAY OF TROPONIN QUANT: CPT

## 2024-12-03 PROCEDURE — 80061 LIPID PANEL: CPT | Performed by: NURSE PRACTITIONER

## 2024-12-03 PROCEDURE — 36415 COLL VENOUS BLD VENIPUNCTURE: CPT

## 2024-12-03 PROCEDURE — 83036 HEMOGLOBIN GLYCOSYLATED A1C: CPT | Performed by: NURSE PRACTITIONER

## 2024-12-03 PROCEDURE — 83880 ASSAY OF NATRIURETIC PEPTIDE: CPT | Performed by: NURSE PRACTITIONER

## 2024-12-03 PROCEDURE — 99285 EMERGENCY DEPT VISIT HI MDM: CPT

## 2024-12-03 PROCEDURE — 96365 THER/PROPH/DIAG IV INF INIT: CPT

## 2024-12-03 PROCEDURE — 80053 COMPREHEN METABOLIC PANEL: CPT

## 2024-12-03 PROCEDURE — G0378 HOSPITAL OBSERVATION PER HR: HCPCS

## 2024-12-03 PROCEDURE — 96366 THER/PROPH/DIAG IV INF ADDON: CPT

## 2024-12-03 PROCEDURE — 71045 X-RAY EXAM CHEST 1 VIEW: CPT

## 2024-12-03 PROCEDURE — 93005 ELECTROCARDIOGRAM TRACING: CPT | Performed by: INTERNAL MEDICINE

## 2024-12-03 PROCEDURE — 25010000002 AMIODARONE IN DEXTROSE 5% 150-4.21 MG/100ML-% SOLUTION: Performed by: INTERNAL MEDICINE

## 2024-12-03 PROCEDURE — 93005 ELECTROCARDIOGRAM TRACING: CPT

## 2024-12-03 PROCEDURE — 85025 COMPLETE CBC W/AUTO DIFF WBC: CPT

## 2024-12-03 RX ORDER — DILTIAZEM HYDROCHLORIDE 120 MG/1
120 CAPSULE, COATED, EXTENDED RELEASE ORAL
Status: DISCONTINUED | OUTPATIENT
Start: 2024-12-03 | End: 2024-12-03

## 2024-12-03 RX ORDER — METOPROLOL SUCCINATE 25 MG/1
25 TABLET, EXTENDED RELEASE ORAL EVERY 12 HOURS SCHEDULED
Status: DISCONTINUED | OUTPATIENT
Start: 2024-12-03 | End: 2024-12-04 | Stop reason: HOSPADM

## 2024-12-03 RX ORDER — BISACODYL 5 MG/1
5 TABLET, DELAYED RELEASE ORAL DAILY PRN
Status: DISCONTINUED | OUTPATIENT
Start: 2024-12-03 | End: 2024-12-04 | Stop reason: HOSPADM

## 2024-12-03 RX ORDER — ACETAMINOPHEN 325 MG/1
650 TABLET ORAL EVERY 4 HOURS PRN
Status: DISCONTINUED | OUTPATIENT
Start: 2024-12-03 | End: 2024-12-04 | Stop reason: HOSPADM

## 2024-12-03 RX ORDER — BENZONATATE 200 MG/1
200 CAPSULE ORAL 3 TIMES DAILY PRN
COMMUNITY
Start: 2024-11-25

## 2024-12-03 RX ORDER — ONDANSETRON 2 MG/ML
4 INJECTION INTRAMUSCULAR; INTRAVENOUS EVERY 6 HOURS PRN
Status: DISCONTINUED | OUTPATIENT
Start: 2024-12-03 | End: 2024-12-04 | Stop reason: HOSPADM

## 2024-12-03 RX ORDER — ALBUTEROL SULFATE 90 UG/1
2 INHALANT RESPIRATORY (INHALATION) EVERY 4 HOURS PRN
COMMUNITY
Start: 2024-11-25 | End: 2024-12-12

## 2024-12-03 RX ORDER — SODIUM CHLORIDE 9 MG/ML
40 INJECTION, SOLUTION INTRAVENOUS AS NEEDED
Status: DISCONTINUED | OUTPATIENT
Start: 2024-12-03 | End: 2024-12-04 | Stop reason: HOSPADM

## 2024-12-03 RX ORDER — ASPIRIN 81 MG/1
324 TABLET, CHEWABLE ORAL ONCE
Status: COMPLETED | OUTPATIENT
Start: 2024-12-03 | End: 2024-12-03

## 2024-12-03 RX ORDER — METOPROLOL SUCCINATE 25 MG/1
25 TABLET, EXTENDED RELEASE ORAL
Status: DISCONTINUED | OUTPATIENT
Start: 2024-12-03 | End: 2024-12-03

## 2024-12-03 RX ORDER — POLYETHYLENE GLYCOL 3350 17 G/17G
17 POWDER, FOR SOLUTION ORAL DAILY PRN
Status: DISCONTINUED | OUTPATIENT
Start: 2024-12-03 | End: 2024-12-04 | Stop reason: HOSPADM

## 2024-12-03 RX ORDER — SODIUM CHLORIDE 0.9 % (FLUSH) 0.9 %
10 SYRINGE (ML) INJECTION AS NEEDED
Status: DISCONTINUED | OUTPATIENT
Start: 2024-12-03 | End: 2024-12-04 | Stop reason: HOSPADM

## 2024-12-03 RX ORDER — SODIUM CHLORIDE 0.9 % (FLUSH) 0.9 %
10 SYRINGE (ML) INJECTION EVERY 12 HOURS SCHEDULED
Status: DISCONTINUED | OUTPATIENT
Start: 2024-12-03 | End: 2024-12-04 | Stop reason: HOSPADM

## 2024-12-03 RX ORDER — AMOXICILLIN 250 MG
2 CAPSULE ORAL 2 TIMES DAILY PRN
Status: DISCONTINUED | OUTPATIENT
Start: 2024-12-03 | End: 2024-12-04 | Stop reason: HOSPADM

## 2024-12-03 RX ORDER — SODIUM CHLORIDE 9 MG/ML
50 INJECTION, SOLUTION INTRAVENOUS CONTINUOUS
Status: DISCONTINUED | OUTPATIENT
Start: 2024-12-03 | End: 2024-12-03

## 2024-12-03 RX ORDER — DILTIAZEM HCL/D5W 125 MG/125
5-15 PLASTIC BAG, INJECTION (ML) INTRAVENOUS CONTINUOUS
Status: DISCONTINUED | OUTPATIENT
Start: 2024-12-03 | End: 2024-12-03

## 2024-12-03 RX ORDER — NITROGLYCERIN 0.4 MG/1
0.4 TABLET SUBLINGUAL
Status: DISCONTINUED | OUTPATIENT
Start: 2024-12-03 | End: 2024-12-04 | Stop reason: HOSPADM

## 2024-12-03 RX ORDER — BISACODYL 10 MG
10 SUPPOSITORY, RECTAL RECTAL DAILY PRN
Status: DISCONTINUED | OUTPATIENT
Start: 2024-12-03 | End: 2024-12-04 | Stop reason: HOSPADM

## 2024-12-03 RX ORDER — AMIODARONE HYDROCHLORIDE 200 MG/1
200 TABLET ORAL EVERY 12 HOURS SCHEDULED
Status: DISCONTINUED | OUTPATIENT
Start: 2024-12-03 | End: 2024-12-04 | Stop reason: HOSPADM

## 2024-12-03 RX ADMIN — Medication 10 ML: at 08:11

## 2024-12-03 RX ADMIN — Medication 10 ML: at 21:09

## 2024-12-03 RX ADMIN — AMIODARONE HYDROCHLORIDE 200 MG: 200 TABLET ORAL at 21:08

## 2024-12-03 RX ADMIN — Medication 7.5 MG/HR: at 03:02

## 2024-12-03 RX ADMIN — Medication 15 MG/HR: at 09:49

## 2024-12-03 RX ADMIN — DILTIAZEM HYDROCHLORIDE 120 MG: 120 CAPSULE, COATED, EXTENDED RELEASE ORAL at 16:37

## 2024-12-03 RX ADMIN — ASPIRIN 81 MG CHEWABLE TABLET 243 MG: 81 TABLET CHEWABLE at 02:47

## 2024-12-03 RX ADMIN — AMIODARONE HYDROCHLORIDE 150 MG: 1.5 INJECTION, SOLUTION INTRAVENOUS at 17:59

## 2024-12-03 RX ADMIN — Medication 5 MG/HR: at 02:48

## 2024-12-03 RX ADMIN — APIXABAN 5 MG: 5 TABLET, FILM COATED ORAL at 08:10

## 2024-12-03 RX ADMIN — APIXABAN 5 MG: 5 TABLET, FILM COATED ORAL at 21:08

## 2024-12-03 RX ADMIN — METOPROLOL SUCCINATE 25 MG: 25 TABLET, EXTENDED RELEASE ORAL at 08:10

## 2024-12-03 NOTE — PROGRESS NOTES
[facility] MEDICINE SERVICE  DAILY PROGRESS NOTE    NAME: Kenny Cruz  : 1965  MRN: [id number]      LOS: 0 days     PROVIDER OF SERVICE: Timothy Duane Brammell, MD    Chief Complaint: Atrial fibrillation with RVR    Subjective:     Interval History:  History taken from: patient    Patient without any further palpitations.  Ongoing IV diltiazem drip.  Cardiology with noted EP evaluation pending recommendations.  Denies any shortness of breath.  Denies any chest pain.  Had upper respiratory complaints of now resolved with initial A-fib in November.  Denies any other additional acute issues.        Review of Systems:   Review of Systems   All other systems reviewed and are negative.      Objective:     Vital Signs  Temp:  [97.8 °F (36.6 °C)] 97.8 °F (36.6 °C)  Heart Rate:  [] 104  Resp:  [16-18] 16  BP: ()/(61-94) 110/77  Flow (L/min) (Oxygen Therapy):  [2] 2   Body mass index is 35.67 kg/m².    Physical Exam  Physical Exam  Vitals and nursing note reviewed.   HENT:      Head: Normocephalic.   Cardiovascular:      Rate and Rhythm: Normal rate. Rhythm irregular.   Pulmonary:      Effort: Pulmonary effort is normal.      Breath sounds: Normal breath sounds.   Abdominal:      Palpations: Abdomen is soft.   Musculoskeletal:         General: No swelling.   Neurological:      Mental Status: He is alert. Mental status is at baseline.            Diagnostic Data    Results from last 7 days   Lab Units 24  0230   WBC 10*3/mm3 11.23*   HEMOGLOBIN g/dL 13.2   HEMATOCRIT % 41.7   PLATELETS 10*3/mm3 374   GLUCOSE mg/dL 119*   CREATININE mg/dL 1.01   BUN mg/dL 20   SODIUM mmol/L 135*   POTASSIUM mmol/L 4.0   AST (SGOT) U/L 20   ALT (SGPT) U/L 33   ALK PHOS U/L 58   BILIRUBIN mg/dL 0.3   ANION GAP mmol/L 9.8       XR Chest 1 View    Result Date: 12/3/2024  Impression: No acute cardiopulmonary abnormality. Electronically Signed: Kyle Segura MD  12/3/2024 3:38 AM EST  Workstation ID: [id number]            Assessment:    A-fib with RVR.     Plan: Ongoing IV diltiazem.  Electrophysiology with pending recommendations.        Active and Resolved Problems  Active Hospital Problems    Diagnosis  POA    **Atrial fibrillation with RVR [I48.91]  Yes      Resolved Hospital Problems   No resolved problems to display.           VTE Prophylaxis:  Pharmacologic & mechanical VTE prophylaxis orders are present.             Disposition Planning:     Barriers to Discharge:cardiology clearance  Anticipated Date of Discharge: 12/5  Place of Discharge: home      Time: 30 minutes     Code Status and Medical Interventions: CPR (Attempt to Resuscitate); Full Support   Ordered at: 12/03/24 0437     Level Of Support Discussed With:    Patient     Code Status (Patient has no pulse and is not breathing):    CPR (Attempt to Resuscitate)     Medical Interventions (Patient has pulse or is breathing):    Full Support       Signature: Electronically signed by Timothy Duane Brammell, MD, 12/03/24, 15:43 EST.  [facility] Hospitalist Team

## 2024-12-03 NOTE — PLAN OF CARE
Goal Outcome Evaluation:   Pt admitted to room from ED. Pt is on cardizem gtt. Pt denies pain or soa at this time. Pt will be NPO after midnight for possible procedure with cardiology on 12/4/24. Safety precautions in place.

## 2024-12-03 NOTE — Clinical Note
Level of Care: Telemetry [5]   Admitting Physician: DORYS FUNEZ [[id number]]   Attending Physician: DORYS FUNEZ [[id number]]

## 2024-12-03 NOTE — PAYOR COMM NOTE
"    Kenny Cruz (59 y.o. Male)       Date of Birth   1965    Social Security Number       Address   [address] [place] [address] [place] [address]    Home Phone   [phone]    MRN   [id number]       Tenriism   Orthodoxy    Marital Status                               Admission Date   12/3/24    Admission Type   Emergency    Admitting Provider   Brammell, Timothy Duane, MD    Attending Provider   Brammell, Timothy Duane, MD    Department, Room/Bed   [facility] EMERGENCY DEPARTMENT,        Discharge Date       Discharge Disposition       Discharge Destination                                 Attending Provider: Brammell, Timothy Duane, MD    Allergies: No Known Allergies    Isolation: None   Infection: None   Code Status: CPR    Ht: 182.9 cm (72\")   Wt: 119 kg (263 lb)    Admission Cmt: None   Principal Problem: Atrial fibrillation with RVR [I48.91]                   Active Insurance as of 12/3/2024       Primary Coverage       Payor Plan Insurance Group Employer/Plan Group    [employer] BLUE [employer]OSS [employer] BLUE CROSS BLUE [employer]IELD PPO [id number]       Payor Plan Address Payor Plan Phone Number Payor Plan Fax Number Effective Dates    [address] [phone]  2018 - None Entered    [place] [address]         Subscriber Name Subscriber Birth Date Member ID       KENNY CRUZ 1965 [id number]                     Emergency Contacts        (Rel.) Home Phone Work Phone Mobile Phone    TAMRA MINAYA (Spouse) -- -- [phone]                 History & Physical        Teresa Subramanian APRN at 24 0437       Attestation signed by Brendon Cho DO at 24 0506    Attending Attestation:    This visit was performed by an NP/PA. I reviewed all aspects of the MDM as documented otherwise stated by my note.                         [facility] Medicine Services  History & Physical    Patient Name: Kenny Cruz  : 1965  MRN: [id number]  Primary Care " Physician:  Cherelle Bernabe APRN  Date of admission: 12/3/2024  Date and Time of Service: 12/3/2024 at 0400    Subjective      Chief Complaint: fast heartrate    History of Present Illness: Kenny Cruz is a 59 y.o. male with a CMH of HTN, recently new atrial fibrillation on eliquis who presented to [facility] on 12/3/2024 with tachycardia. Lives at home with wife, independent with ADLs. Last hospitalization 11/15-11/19/2024 for new onset atrial fibrillation/flutter with RVR. Underwent DELANEY with cardioversion on 11/18 with conversion to NSR. Discharged home with metoprolol and eliquis. Also with bronchitis and acute conjunctivitis treated with amoxicillin and gentamicin eye drops. Was to follow up with EP Dr. Garcia on 12/12.   Presented to ED tonight for tachycardia. States stopped drinking caffeine after discharge. Over past 2 days with 2 glasses of soda. Endorses compliance with home metoprolol and eliquis. States was lying in bed when hear heartbeat against pillow. Home BP monitoring revealed 's. Denied CP, dyspnea, palpitations. On arrival to ED VS: 97.7-556-/94-94% room air. Was initiated on diltiazem infusion and medicated with  mg po.     Upon evaluation, awake, alert, resting in bed, wife at bedside. Current VS: 128-/74-95% room air. Bedside heart monitoring Afib. Denies CP, dyspnea, diaphoresis.   EKG reveals atrial tachycardia. Rate 147. . Qtc 446  CXR negative  Blood work reveals WBC 11.23, Hgb 13.2, Hct 41.7, sodium 135, troponin negative at 7, INR 1.2, PT 15.2      Review of Systems   Constitutional:  Positive for fatigue. Negative for chills and fever.   Respiratory:  Negative for shortness of breath.    Cardiovascular:  Positive for palpitations. Negative for chest pain.   Gastrointestinal:  Negative for abdominal pain.   Genitourinary:  Negative for dysuria.       Personal History     Past Medical History:   Diagnosis Date    Hypertension        No past  surgical history on file.    Family History: family history includes Coronary artery disease in his father; Diabetes in his sister; Obesity in his sister; Other in his mother; Valvular heart disease in his mother. Otherwise pertinent FHx was reviewed and not pertinent to current issue.    Social History:  reports that he has never smoked. He has never been exposed to tobacco smoke. He has never used smokeless tobacco. He reports current alcohol use. He reports that he does not use drugs.    Home Medications:  Prior to Admission Medications       Prescriptions Last Dose Informant Patient Reported? Taking?    acetaminophen (TYLENOL) 500 MG tablet   Yes No    Take 1 tablet by mouth Every 6 (Six) Hours As Needed for Mild Pain.    apixaban (ELIQUIS) 5 MG tablet tablet   No No    Take 1 tablet by mouth Every 12 (Twelve) Hours for 90 days. Indications: Atrial Fibrillation    metoprolol succinate XL (TOPROL-XL) 25 MG 24 hr tablet   No No    Take 1 tablet by mouth Daily for 90 days.              Allergies:  No Known Allergies    Objective      Vitals:   Temp:  [97.8 °F (36.6 °C)] 97.8 °F (36.6 °C)  Heart Rate:  [] 118  Resp:  [18] 18  BP: ()/(61-94) 104/69  Body mass index is 35.7 kg/m².  Physical Exam  Constitutional:       Appearance: Normal appearance.   HENT:      Head: Normocephalic and atraumatic.      Mouth/Throat:      Mouth: Mucous membranes are moist.   Eyes:      General: No scleral icterus.     Extraocular Movements: Extraocular movements intact.      Pupils: Pupils are equal, round, and reactive to light.   Cardiovascular:      Rate and Rhythm: Tachycardia present. Rhythm irregular.      Pulses: Normal pulses.      Heart sounds: Normal heart sounds.      Comments: Denies chest pain or reproducible CP   Pulmonary:      Effort: Pulmonary effort is normal.      Breath sounds: Normal breath sounds.   Abdominal:      General: Bowel sounds are normal. There is no distension.      Palpations: Abdomen is  soft.      Tenderness: There is no abdominal tenderness.   Musculoskeletal:      Right lower leg: Edema present.      Left lower leg: Edema present.      Comments: 1+ pitting edema bilateral ankles.    Skin:     General: Skin is warm and dry.   Neurological:      Mental Status: He is alert.         Diagnostic Data:  Lab Results (last 24 hours)       Procedure Component Value Units Date/Time    High Sensitivity Troponin T 2Hr [[id number]] Collected: 12/03/24 0430    Specimen: Blood from Arm, Left Updated: 12/03/24 0432    Comprehensive Metabolic Panel [[id number]]  (Abnormal) Collected: 12/03/24 0230    Specimen: Blood Updated: 12/03/24 0300     Glucose 119 mg/dL      BUN 20 mg/dL      Creatinine 1.01 mg/dL      Sodium 135 mmol/L      Potassium 4.0 mmol/L      Chloride 99 mmol/L      CO2 26.2 mmol/L      Calcium 9.5 mg/dL      Total Protein 6.7 g/dL      Albumin 3.9 g/dL      ALT (SGPT) 33 U/L      AST (SGOT) 20 U/L      Alkaline Phosphatase 58 U/L      Total Bilirubin 0.3 mg/dL      Globulin 2.8 gm/dL      A/G Ratio 1.4 g/dL      BUN/Creatinine Ratio 19.8     Anion Gap 9.8 mmol/L      eGFR 86.7 mL/min/1.73     Narrative:      GFR Normal >60  Chronic Kidney Disease <60  Kidney Failure <15      High Sensitivity Troponin T [[id number]]  (Normal) Collected: 12/03/24 0230    Specimen: Blood Updated: 12/03/24 0300     HS Troponin T 7 ng/L     Narrative:      High Sensitive Troponin T Reference Range:  <14.0 ng/L- Negative Female for AMI  <22.0 ng/L- Negative Male for AMI  >=14 - Abnormal Female indicating possible myocardial injury.  >=22 - Abnormal Male indicating possible myocardial injury.   Clinicians would have to utilize clinical acumen, EKG, Troponin, and serial changes to determine if it is an Acute Myocardial Infarction or myocardial injury due to an underlying chronic condition.         [place] Draw [[id number]] Collected: 12/03/24 0230    Specimen: Blood Updated: 12/03/24 0245    Narrative:      The following  orders were created for panel order [place] Draw.  Procedure                               Abnormality         Status                     ---------                               -----------         ------                     Green Top (Gel)[[id number]]                                  Final result               Lavender Top[[id number]]                                     Final result               Gold Top - SST[[id number]]                                   Final result               Light Blue Top[[id number]]                                   Final result                 Please view results for these tests on the individual orders.    Green Top (Gel) [[id number]] Collected: 12/03/24 0230    Specimen: Blood Updated: 12/03/24 0245     Extra Tube Hold for add-ons.     Comment: Auto resulted.       Lavender Top [[id number]] Collected: 12/03/24 0230    Specimen: Blood Updated: 12/03/24 0245     Extra Tube hold for add-on     Comment: Auto resulted       Gold Top - SST [[id number]] Collected: 12/03/24 0230    Specimen: Blood Updated: 12/03/24 0245     Extra Tube Hold for add-ons.     Comment: Auto resulted.       Light Blue Top [[id number]] Collected: 12/03/24 0230    Specimen: Blood Updated: 12/03/24 0245     Extra Tube Hold for add-ons.     Comment: Auto resulted       Protime-INR [[id number]]  (Abnormal) Collected: 12/03/24 0230    Specimen: Blood Updated: 12/03/24 0245     Protime 15.2 Seconds      INR 1.20    CBC & Differential [[id number]]  (Abnormal) Collected: 12/03/24 0230    Specimen: Blood Updated: 12/03/24 0235    Narrative:      The following orders were created for panel order CBC & Differential.  Procedure                               Abnormality         Status                     ---------                               -----------         ------                     CBC Auto Differential[[id number]]        Abnormal            Final result                 Please view results for these tests on the individual orders.     CBC Auto Differential [[id number]]  (Abnormal) Collected: 12/03/24 0230    Specimen: Blood Updated: 12/03/24 0235     WBC 11.23 10*3/mm3      RBC 4.55 10*6/mm3      Hemoglobin 13.2 g/dL      Hematocrit 41.7 %      MCV 91.6 fL      MCH 29.0 pg      MCHC 31.7 g/dL      RDW 14.0 %      RDW-SD 46.7 fl      MPV 9.9 fL      Platelets 374 10*3/mm3      Neutrophil % 69.3 %      Lymphocyte % 18.0 %      Monocyte % 9.0 %      Eosinophil % 2.0 %      Basophil % 0.5 %      Immature Grans % 1.2 %      Neutrophils, Absolute 7.77 10*3/mm3      Lymphocytes, Absolute 2.02 10*3/mm3      Monocytes, Absolute 1.01 10*3/mm3      Eosinophils, Absolute 0.23 10*3/mm3      Basophils, Absolute 0.06 10*3/mm3      Immature Grans, Absolute 0.14 10*3/mm3      nRBC 0.0 /100 WBC              Imaging Results (Last 24 Hours)       Procedure Component Value Units Date/Time    XR Chest 1 View [[id number]] Collected: 12/03/24 0337     Updated: 12/03/24 0340    Narrative:      XR CHEST 1 VW    Date of Exam: 12/3/2024 2:43 AM EST    Indication: Chest Pain Protocol    Comparison: 11/13/2024.    Findings:  There is no pneumothorax, pleural effusion or focal airspace consolidation. Heart size and pulmonary vasculature appear within normal limits. Regional bones appear intact.      Impression:      Impression:  No acute cardiopulmonary abnormality.        Electronically Signed: Kyle Segura MD    12/3/2024 3:38 AM EST    Workstation ID: [id number]              Assessment & Plan        This is a 59 y.o. male with PMH of HTN and recently diagnosed Afib with RVR on 11/15 s/p DELANEY with cardioversion to NSR on 11/18. Discharged home on metoprolol and eliquis. Returns to ED tonight with tachycardia. Work up revealed afib/flutter with RVR. Initiated on diltiazem infusion in ED. Will require admission for treatment of RVR and cardiology consultation.         Active and Resolved Problems  Active Hospital Problems    Diagnosis  POA    **Atrial fibrillation with RVR  [I48.91]  Yes      Resolved Hospital Problems   No resolved problems to display.     Atrial Fibrillation/Flutter with RVR  -EKG: atrial tachycardia. Rate 147. . Qtc 446  -serial troponins pending  -TSH 2.8 on 11/15/2024  -s/p DELANEY with cardioversion on 11/18: EF 56-60%, mild concentric hypertrophy. Saline test negative. Post cardioversion displaced NSR.   -continue telemetry monitoring  -s/p diltiazem 10 mg IVP x 1, initiated on diltiazem infusion. Titrate for HR < 90.   -[id number] score 1, will continue eliquis.   -cardiology consult for further evaluation and management  -will likely need EP consult in AM.     Hypertension:  -trend BP  -continue metoprolol               VTE Prophylaxis:  Mechanical VTE prophylaxis orders are present.        The patient desires to be as follows:    CODE STATUS:    Level Of Support Discussed With: Patient  Code Status (Patient has no pulse and is not breathing): CPR (Attempt to Resuscitate)  Medical Interventions (Patient has pulse or is breathing): Full Support        Admission Status:  I believe this patient meets IP status.    Expected Length of Stay: 3 days    PDMP and Medication Dispenses via Sidebar reviewed and consistent with patient reported medications.    I discussed the patient's findings and my recommendations with patient, family, and nursing staff.      Signature:     This document has been electronically signed by UMESH Camara on December 3, 2024 04:39 [facility]    Electronically signed by Brendon Cho DO at 12/03/24 0507          Emergency Department Notes        Alycia Murray APRN at 12/03/24 0330       Attestation signed by Stewart Ferraro MD at 12/03/24 0749        SHARED APC NON FACE TO FACE: I performed a substantive part of the MDM during the patient's E/M visit. I personally made or approved the documented management plan and acknowledge its risk of complications.   Stewart Ferraro MD 12/3/2024 07:33  EST                         Subjective   Chief Complaint   Patient presents with    Rapid Heart Rate       History of Present Illness  Patient is a 59-year-old man who reports heart palpitations that got worse today.  He has a history of A-fib and was just here approximately 2 weeks ago for A-fib RVR.  States that he has an appointment coming up with Dr. Garcia on 1212 to discuss an ablation.  States he was lying in bed when he started feeling his heart race and could hear his heart beats against his pillow.  Denies any nausea vomiting chest pain shortness of breath or other symptom or complication.  Reports compliance with all medications.  Review of Systems   Constitutional: Negative.    HENT: Negative.     Eyes: Negative.    Respiratory: Negative.     Cardiovascular:  Positive for palpitations.   Gastrointestinal: Negative.    Endocrine: Negative.    Genitourinary: Negative.    Musculoskeletal: Negative.    Skin: Negative.    Allergic/Immunologic: Negative.    Neurological: Negative.    Hematological: Negative.    Psychiatric/Behavioral: Negative.         Past Medical History:   Diagnosis Date    Hypertension        No Known Allergies    No past surgical history on file.    Family History   Problem Relation Age of Onset    Valvular heart disease Mother     Other Mother         pacemaker    Coronary artery disease Father         CABG x 2     Diabetes Sister     Obesity Sister        Social History     Socioeconomic History    Marital status:     Number of children: 1   Tobacco Use    Smoking status: Never     Passive exposure: Never    Smokeless tobacco: Never   Vaping Use    Vaping status: Never Used   Substance and Sexual Activity    Alcohol use: Yes     Comment: rare    Drug use: No    Sexual activity: Defer           Objective   Physical Exam  Vitals and nursing note reviewed.   Constitutional:       General: He is not in acute distress.     Appearance: Normal appearance. He is not ill-appearing,  "toxic-appearing or diaphoretic.   HENT:      Head: Normocephalic and atraumatic.      Right Ear: External ear normal.      Left Ear: External ear normal.      Nose: Nose normal.      Mouth/Throat:      Mouth: Mucous membranes are moist.      Pharynx: Oropharynx is clear.   Eyes:      Extraocular Movements: Extraocular movements intact.      Conjunctiva/sclera: Conjunctivae normal.      Pupils: Pupils are equal, round, and reactive to light.   Cardiovascular:      Rate and Rhythm: Regular rhythm. Tachycardia present.      Heart sounds:      Gallop present.   Pulmonary:      Effort: Pulmonary effort is normal.      Breath sounds: Normal breath sounds.   Abdominal:      General: Bowel sounds are normal.      Palpations: Abdomen is soft.   Musculoskeletal:         General: Normal range of motion.      Cervical back: Normal range of motion and neck supple.   Skin:     General: Skin is warm and dry.      Capillary Refill: Capillary refill takes less than 2 seconds.   Neurological:      General: No focal deficit present.      Mental Status: He is alert.   Psychiatric:         Mood and Affect: Mood normal.         Behavior: Behavior normal.         Thought Content: Thought content normal.         Judgment: Judgment normal.         Procedures          ED Course      /71   Pulse (!) 154   Temp 97.8 °F (36.6 °C)   Resp 18   Ht 182.9 cm (72\")   Wt 119 kg (263 lb 3.7 oz)   SpO2 94%   BMI 35.70 kg/m²   Labs Reviewed   COMPREHENSIVE METABOLIC PANEL - Abnormal; Notable for the following components:       Result Value    Glucose 119 (*)     Sodium 135 (*)     All other components within normal limits    Narrative:     GFR Normal >60  Chronic Kidney Disease <60  Kidney Failure <15     PROTIME-INR - Abnormal; Notable for the following components:    Protime 15.2 (*)     INR 1.20 (*)     All other components within normal limits   CBC WITH AUTO DIFFERENTIAL - Abnormal; Notable for the following components:    WBC 11.23 (*) "     Lymphocyte % 18.0 (*)     Immature Grans % 1.2 (*)     Neutrophils, Absolute 7.77 (*)     Monocytes, Absolute 1.01 (*)     Immature Grans, Absolute 0.14 (*)     All other components within normal limits   TROPONIN - Normal    Narrative:     High Sensitive Troponin T Reference Range:  <14.0 ng/L- Negative Female for AMI  <22.0 ng/L- Negative Male for AMI  >=14 - Abnormal Female indicating possible myocardial injury.  >=22 - Abnormal Male indicating possible myocardial injury.   Clinicians would have to utilize clinical acumen, EKG, Troponin, and serial changes to determine if it is an Acute Myocardial Infarction or myocardial injury due to an underlying chronic condition.        [place] DRAW    Narrative:     The following orders were created for panel order [place] Draw.  Procedure                               Abnormality         Status                     ---------                               -----------         ------                     Green Top (Gel)[[id number]]                                  Final result               Lavender Top[[id number]]                                     Final result               Gold Top - SST[[id number]]                                   Final result               Light Blue Top[[id number]]                                   Final result                 Please view results for these tests on the individual orders.   HIGH SENSITIVITIY TROPONIN T 2HR   CBC AND DIFFERENTIAL    Narrative:     The following orders were created for panel order CBC & Differential.  Procedure                               Abnormality         Status                     ---------                               -----------         ------                     CBC Auto Differential[[id number]]        Abnormal            Final result                 Please view results for these tests on the individual orders.   GREEN TOP   LAVENDER TOP   GOLD TOP - SST   LIGHT BLUE TOP     .edds  XR Chest 1 View    Result Date:  12/3/2024  Impression: No acute cardiopulmonary abnormality. Electronically Signed: Kyle Segura MD  12/3/2024 3:38 AM EST  Workstation ID: [id number]                                                    Medical Decision Making  Patient presents to the ED for the above complaint, underwent the above exam and workup.    EKG reviewed: EKG is interpreted by Dr. Ferraro and reviewed by myself shows atrial flutter with 2-1 conduction rate of 147.  Previous EKG from 11/18/2024 shows sinus rhythm with a probable left atrial enlargement incomplete right bundle branch block with a rate of 72.    Reviewed external records from 11/21/2024 with [facility] for hospital discharge follow-up and A-fib RVR.    Reviewed internal records dated 11/15/2024 for atrial fibrillation with RVR admission.    Differential diagnosis considered: A-fib RVR, MI, ACS    Overall presentation is consistent with a flutter    Patient was treated with the following while in the ED:  Medications   sodium chloride 0.9 % flush 10 mL (has no administration in time range)   dilTIAZem (CARDIZEM) 125 mg in 125 mL D5W infusion (10 mg/hr Intravenous Rate/Dose Change 12/3/24 0351)   aspirin chewable tablet 324 mg (243 mg Oral Given 12/3/24 0247)   dilTIAZem (CARDIZEM) bolus from bag 1 mg/mL solution 10 mg (10 mg Intravenous Bolus from Bag 12/3/24 0246)     Evaluation patient IV established labs imaging EKG were obtained.  Results as above.  Labs are essentially unremarkable initial troponin was 7 repeat troponin pending at time of admission.  CMP essentially unremarkable sodium 135 glucose 119.  PT/INR within normal limits.  CBC shows a white count of 11.23.  Patient x-ray shows no acute abnormalities.  Patient started on Cardizem drip and initially was responsive however then became tachycardic again.  Dose was increased and hospitalist provider was called for admission.  Discussed this with patient who is agreeable to admission so that he can potentially see  cardiology sooner to discuss ablation.    Discussed case with UMESH Moore, who agreed to admit patient.      Final diagnoses:   Atrial flutter, unspecified type   Rapid heart rate       ED Disposition  ED Disposition       ED Disposition   Decision to Admit    Condition   --    Comment   Level of Care: Telemetry [5]   Admitting Physician: DORYS FUNEZ [[id number]]   Attending Physician: DORYS FUNEZ [[id number]]                 No follow-up provider specified.       Medication List      No changes were made to your prescriptions during this visit.            Alycia Murray APRN  12/03/24 0413      Electronically signed by Stewart Ferraro MD at 12/03/24 0785       Vital Signs (last 2 days)       Date/Time Temp Temp src Pulse Resp BP SpO2    12/03/24 1100 -- -- 104 16 110/77 94    12/03/24 0900 -- -- 78 -- 112/71 92    12/03/24 0812 97.8 (36.6) Oral 99 17 104/67 94    12/03/24 0705 -- -- 127 18 118/76 94    12/03/24 0511 -- -- 106 -- 94/67 90    12/03/24 0509 -- -- 99 -- -- 94    12/03/24 0504 -- -- 119 -- -- 94    12/03/24 0501 -- -- 101 -- 100/63 91    12/03/24 0454 -- -- 137 -- 96/73 94    12/03/24 0433 -- -- 118 -- -- 95    12/03/24 0432 -- -- 150 -- 104/69 94    12/03/24 0429 -- -- 125 -- -- 94    12/03/24 0422 -- -- 148 -- -- 96    12/03/24 0421 -- -- 125 -- 103/77 90    12/03/24 0417 -- -- 149 -- -- 95    12/03/24 0412 -- -- 152 18 109/74 95    12/03/24 0351 -- -- 154 -- 111/71 94    12/03/24 0336 -- -- 103 -- -- 92    12/03/24 0335 -- -- 153 -- -- 94    12/03/24 0334 -- -- 96 -- -- 94    12/03/24 0331 -- -- 128 18 100/68 91    12/03/24 0321 -- -- 101 18 98/70 90    12/03/24 0316 -- -- 118 -- -- 93    12/03/24 0312 -- -- 155 -- 118/61 94    12/03/24 0302 -- -- 149 18 113/65 94    12/03/24 0246 -- -- 151 -- 116/86 --    12/03/24 0206 97.8 (36.6) -- 145 18 129/94 94          Facility-Administered Medications as of 12/3/2024   Medication Dose Route Frequency Provider Last Rate Last Admin    acetaminophen  (TYLENOL) tablet 650 mg  650 mg Oral Q4H PRN Teresa Subramanian APRN        apixaban (ELIQUIS) tablet 5 mg  5 mg Oral Q12H Teresa Subramanian APRN   5 mg at 12/03/24 0810    [COMPLETED] aspirin chewable tablet 324 mg  324 mg Oral Once Alycia Murray APRN   243 mg at 12/03/24 0247    sennosides-docusate (PERICOLACE) 8.6-50 MG per tablet 2 tablet  2 tablet Oral BID PRN Teresa Subramanian APRN        And    polyethylene glycol (MIRALAX) packet 17 g  17 g Oral Daily PRN Teresa Subramanian APRN        And    bisacodyl (DULCOLAX) EC tablet 5 mg  5 mg Oral Daily PRN Teresa Subramanian APRN        And    bisacodyl (DULCOLAX) suppository 10 mg  10 mg Rectal Daily PRN Teresa Subramanian APRN        dilTIAZem (CARDIZEM) 125 mg in 125 mL D5W infusion  5-15 mg/hr Intravenous Continuous Alycia Murray APRN 10 mL/hr at 12/03/24 1204 10 mg/hr at 12/03/24 1204    [COMPLETED] dilTIAZem (CARDIZEM) bolus from bag 1 mg/mL solution 10 mg  10 mg Intravenous Once Alycia Murray APRN   10 mg at 12/03/24 0246    Magnesium Standard Dose Replacement - Follow Nurse / BPA Driven Protocol   Not Applicable PRN Teresa Subramanian APRN        metoprolol succinate XL (TOPROL-XL) 24 hr tablet 25 mg  25 mg Oral Q24H Teresa Subramanian APRN   25 mg at 12/03/24 0810    nitroglycerin (NITROSTAT) SL tablet 0.4 mg  0.4 mg Sublingual Q5 Min PRN Teresa Subramanian APRN        ondansetron (ZOFRAN) injection 4 mg  4 mg Intravenous Q6H PRN Teresa Subramanian APRN        Phosphorus Replacement - Follow Nurse / BPA Driven Protocol   Not Applicable PRN Teresa Subramanian APRN        Potassium Replacement - Follow Nurse / BPA Driven Protocol   Not Applicable PRN Teresa Subramanian APRN        sodium chloride 0.9 % flush 10 mL  10 mL Intravenous PRN Alycia Murray APRN        sodium chloride 0.9 % flush 10 mL  10 mL Intravenous Q12H Teresa Subramanian APRN   10 mL at 12/03/24 0811    sodium chloride 0.9 % flush 10 mL  10 mL Intravenous PRN  Teresa Subramanian APRN        sodium chloride 0.9 % infusion 40 mL  40 mL Intravenous PRN Teresa Subramanian APRN         Operative/Procedure Notes (all)    No notes of this type exist for this encounter.       Physician Progress Notes (all)    No notes of this type exist for this encounter.          Consult Notes (all)        Paige Guadalupe APRN at 12/03/24 0856        Consult Orders    1. Inpatient Cardiology Consult [[id number]] ordered by Teresa Subramanian APRN at 12/03/24 0437                 Cardiology [place]        Subjective:     Encounter Date:12/03/2024    Subjective  Patient ID: Kenny Cruz is a 59 y.o. male.    Chief Complaint: elevated heart rate     Referring Physician: Teresa Subramanian APRN    HPI:  Kenny Cruz is a 59 y.o. male who presents with elevated heart rate. Mr. Cruz was seen by Dr. Garcia when hospitalized from 11/15-11/19. He also has a scheduled appointment with Dr. De Leon. Pmh includes HTN and newly diagnosed atrial fibrillation. He is on Eliquis.    He was recently hospitalized 11/15-11/19 with atrial fibrillation, new onset. He was also treated for bronchitis and conjunctivitis. He was cardioverted and started on multaq but had nocturnal bradycardia therefore multaq stopped. He was placed on beta blockers and discharged home.    He presented to ER again with tachycardia. He noted his heart racing last night as he laid down to go to sleep. He denies chest pain or shortness of breath. No s/s of HF, no edema, PND orthopnea. He has been in his usual state of health, he did have bronchitis and conjunctivitis on prior admission.       Past Medical History:   Diagnosis Date    Hypertension        History reviewed. No pertinent surgical history.    Family History   Problem Relation Age of Onset    Valvular heart disease Mother     Other Mother         pacemaker    Coronary artery disease Father         CABG x 2     Diabetes Sister     Obesity Sister        Social History  "    Socioeconomic History    Marital status:     Number of children: 1   Tobacco Use    Smoking status: Never     Passive exposure: Never    Smokeless tobacco: Never   Vaping Use    Vaping status: Never Used   Substance and Sexual Activity    Alcohol use: Yes     Comment: rare    Drug use: No    Sexual activity: Defer         No Known Allergies    Current Medications:   Scheduled Meds:apixaban, 5 mg, Oral, Q12H  metoprolol succinate XL, 25 mg, Oral, Q24H  sodium chloride, 10 mL, Intravenous, Q12H      Continuous Infusions:dilTIAZem, 5-15 mg/hr, Last Rate: 15 mg/hr (12/03/24 0747)        Review of Systems   Constitutional: Negative for chills, diaphoresis and malaise/fatigue.   Cardiovascular:  Positive for palpitations. Negative for chest pain, dyspnea on exertion, irregular heartbeat, leg swelling, near-syncope, orthopnea, paroxysmal nocturnal dyspnea and syncope.   Respiratory:  Negative for cough, shortness of breath, sleep disturbances due to breathing and sputum production.    Gastrointestinal:  Negative for change in bowel habit.   Genitourinary:  Negative for urgency.   Neurological:  Negative for dizziness and headaches.   Psychiatric/Behavioral:  Negative for altered mental status.          Objective:   Objective      /67   Pulse 99   Temp 97.8 °F (36.6 °C) (Oral)   Resp 17   Ht 182.9 cm (72\")   Wt 119 kg (263 lb)   SpO2 94%   BMI 35.67 kg/m²     Physical Exam:  General Appearance:    Alert, cooperative, in no acute distress                                Head: Atraumatic, normocephalic, PERRLA               Neck:   supple, trachea midline, no thyromegaly, no carotid bruit, no JVD   Lungs:     Clear to auscultation,respirations regular, even and               unlabored    Heart:    Regular rhythm and normal rate, normal S1 and S2   Abdomen:     Normal bowel sounds, no masses, no organomegaly, soft  nontender, nondistended, no guarding, no rebound  tenderness   Extremities:   Moves all " extremities well, no edema, no cyanosis, no  redness   Pulses:   Pulses palpable and equal bilaterally   Skin:   No bleeding, bruising or rash   Neurologic:   Awake, alert, oriented x3                 ASCVD Risk Score::  The 10-year ASCVD risk score (Nicolás ROUSSEAU, et al., 2019) is: 4.4%    Values used to calculate the score:      Age: 59 years      Sex: Male      Is Non- : No      Diabetic: No      Tobacco smoker: No      Systolic Blood Pressure: 104 mmHg      Is BP treated: Yes      HDL Cholesterol: 42 mg/dL      Total Cholesterol: 142 mg/dL      Lab Review:     Results from last 7 days   Lab Units 12/03/24  0230   SODIUM mmol/L 135*   POTASSIUM mmol/L 4.0   CHLORIDE mmol/L 99   CO2 mmol/L 26.2   BUN mg/dL 20   CREATININE mg/dL 1.01   GLUCOSE mg/dL 119*   CALCIUM mg/dL 9.5   AST (SGOT) U/L 20   ALT (SGPT) U/L 33     Results from last 7 days   Lab Units 12/03/24  0430 12/03/24  0230   HSTROP T ng/L 7 7     Results from last 7 days   Lab Units 12/03/24  0230   WBC 10*3/mm3 11.23*   HEMOGLOBIN g/dL 13.2   HEMATOCRIT % 41.7   PLATELETS 10*3/mm3 374     Results from last 7 days   Lab Units 12/03/24  0230   INR  1.20*         Results from last 7 days   Lab Units 12/03/24  0430   CHOLESTEROL mg/dL 142   TRIGLYCERIDES mg/dL 126   HDL CHOL mg/dL 42     Results from last 7 days   Lab Units 12/03/24  0430   PROBNP pg/mL 1,459.0*           Recent Radiology:  Imaging Results (Most Recent)       Procedure Component Value Units Date/Time    XR Chest 1 View [[id number]] Collected: 12/03/24 0337     Updated: 12/03/24 0340    Narrative:      XR CHEST 1 VW    Date of Exam: 12/3/2024 2:43 AM EST    Indication: Chest Pain Protocol    Comparison: 11/13/2024.    Findings:  There is no pneumothorax, pleural effusion or focal airspace consolidation. Heart size and pulmonary vasculature appear within normal limits. Regional bones appear intact.      Impression:      Impression:  No acute cardiopulmonary  abnormality.        Electronically Signed: Kyle Segura MD    12/3/2024 3:38 AM EST    Workstation ID: [id number]              ECHOCARDIOGRAM:    Results for orders placed during the hospital encounter of 11/15/24    Adult Transesophageal Echo (DELANEY) W/ Cont if Necessary Per Protocol (Cardiology Department)    Interpretation Summary    Left ventricular systolic function is normal. Left ventricular ejection fraction appears to be 56 - 60%.    Left ventricular wall thickness is consistent with mild concentric hypertrophy.    Left ventricular diastolic function was normal.    Saline test results are negative.                 Assessment:   Assessment      Active Hospital Problems    Diagnosis  POA    **Atrial fibrillation with RVR [I48.91]  Yes     Atrial Flutter with RVR    2. Atrial fibrillation with RVR s/p cardioversion 11/18/2024  Anticoagulation with Eliquis   Was on toprol XL 25mg daily  TSH WNL    3. Recent h/o bronchitis / conjunctivitis    4. HTN    5. Normal LVEF    Plan:   Patient currently on diltiazem gtt   Atrial flutter is rate controlled  Recent admission with Afib new onset, s/p CV  A/c with Eliquis  On toprol XL 25mg at home  Consult EP for ablation               UMESH Solomon  12/03/24  08:57 EST      Electronically signed by Paige Guadalupe APRN at 12/03/24 113

## 2024-12-03 NOTE — PLAN OF CARE
Problem: Adult Inpatient Plan of Care  Goal: Readiness for Transition of Care  Intervention: Mutually Develop Transition Plan  Description: Identify available resources for support (e.g., family, friends, community).  Identify and address barriers to ongoing treatment and home management (e.g., environmental, financial).  Provide opportunities to practice self-management skills.  Assess and monitor emotional readiness for transition.  Establish or reconnect linkage with outpatient providers or community-based services.  Recent Flowsheet Documentation  Taken 12/3/2024 4057 by Kellen Gonsalves, RN  Transportation Anticipated: family or friend will provide  Patient/Family Anticipated Services at Transition: none  Patient/Family Anticipates Transition to: home with family  Taken 12/3/2024 5801 by Kellen Gonsalves, RN  Equipment Currently Used at Home: none   Goal Outcome Evaluation:

## 2024-12-03 NOTE — CONSULTS
Cardiology [place]        Subjective:     Encounter Date:12/03/2024      Patient ID: Kenny Cruz is a 59 y.o. male.    Chief Complaint: elevated heart rate     Referring Physician: Teresa Subramanian APRN    HPI:  Kenny Cruz is a 59 y.o. male who presents with elevated heart rate. Mr. Cruz was seen by Dr. Garcia when hospitalized from 11/15-11/19. He also has a scheduled appointment with Dr. De Leon. Pmh includes HTN and newly diagnosed atrial fibrillation. He is on Eliquis.    He was recently hospitalized 11/15-11/19 with atrial fibrillation, new onset. He was also treated for bronchitis and conjunctivitis. He was cardioverted and started on multaq but had nocturnal bradycardia therefore multaq stopped. He was placed on beta blockers and discharged home.    He presented to ER again with tachycardia. He noted his heart racing last night as he laid down to go to sleep. He denies chest pain or shortness of breath. No s/s of HF, no edema, PND orthopnea. He has been in his usual state of health, he did have bronchitis and conjunctivitis on prior admission.       Past Medical History:   Diagnosis Date    Hypertension        History reviewed. No pertinent surgical history.    Family History   Problem Relation Age of Onset    Valvular heart disease Mother     Other Mother         pacemaker    Coronary artery disease Father         CABG x 2     Diabetes Sister     Obesity Sister        Social History     Socioeconomic History    Marital status:     Number of children: 1   Tobacco Use    Smoking status: Never     Passive exposure: Never    Smokeless tobacco: Never   Vaping Use    Vaping status: Never Used   Substance and Sexual Activity    Alcohol use: Yes     Comment: rare    Drug use: No    Sexual activity: Defer         No Known Allergies    Current Medications:   Scheduled Meds:apixaban, 5 mg, Oral, Q12H  metoprolol succinate XL, 25 mg, Oral, Q12H  sodium chloride, 10 mL, Intravenous, Q12H      Continuous  "Infusions:dilTIAZem, 5-15 mg/hr, Last Rate: 5 mg/hr (12/03/24 1331)        Review of Systems   Constitutional: Negative for chills, diaphoresis and malaise/fatigue.   Cardiovascular:  Positive for palpitations. Negative for chest pain, dyspnea on exertion, irregular heartbeat, leg swelling, near-syncope, orthopnea, paroxysmal nocturnal dyspnea and syncope.   Respiratory:  Negative for cough, shortness of breath, sleep disturbances due to breathing and sputum production.    Gastrointestinal:  Negative for change in bowel habit.   Genitourinary:  Negative for urgency.   Neurological:  Negative for dizziness and headaches.   Psychiatric/Behavioral:  Negative for altered mental status.           Objective:         /77   Pulse 104   Temp 97.8 °F (36.6 °C) (Oral)   Resp 16   Ht 182.9 cm (72\")   Wt 119 kg (263 lb)   SpO2 94%   BMI 35.67 kg/m²     Physical Exam:  General Appearance:    Alert, cooperative, in no acute distress                                Head: Atraumatic, normocephalic, PERRLA               Neck:   supple, trachea midline, no thyromegaly, no carotid bruit, no JVD   Lungs:     Clear to auscultation,respirations regular, even and               unlabored    Heart:    Regular rhythm and normal rate, normal S1 and S2   Abdomen:     Normal bowel sounds, no masses, no organomegaly, soft  nontender, nondistended, no guarding, no rebound  tenderness   Extremities:   Moves all extremities well, no edema, no cyanosis, no  redness   Pulses:   Pulses palpable and equal bilaterally   Skin:   No bleeding, bruising or rash   Neurologic:   Awake, alert, oriented x3                 ASCVD Risk Score::  The 10-year ASCVD risk score ([place] DK, et al., 2019) is: 4.8%    Values used to calculate the score:      Age: 59 years      Sex: Male      Is Non- : No      Diabetic: No      Tobacco smoker: No      Systolic Blood Pressure: 110 mmHg      Is BP treated: Yes      HDL Cholesterol: 42 " mg/dL      Total Cholesterol: 142 mg/dL      Lab Review:     Results from last 7 days   Lab Units 12/03/24  0230   SODIUM mmol/L 135*   POTASSIUM mmol/L 4.0   CHLORIDE mmol/L 99   CO2 mmol/L 26.2   BUN mg/dL 20   CREATININE mg/dL 1.01   GLUCOSE mg/dL 119*   CALCIUM mg/dL 9.5   AST (SGOT) U/L 20   ALT (SGPT) U/L 33     Results from last 7 days   Lab Units 12/03/24  0430 12/03/24  0230   HSTROP T ng/L 7 7     Results from last 7 days   Lab Units 12/03/24  0230   WBC 10*3/mm3 11.23*   HEMOGLOBIN g/dL 13.2   HEMATOCRIT % 41.7   PLATELETS 10*3/mm3 374     Results from last 7 days   Lab Units 12/03/24  0230   INR  1.20*         Results from last 7 days   Lab Units 12/03/24  0430   CHOLESTEROL mg/dL 142   TRIGLYCERIDES mg/dL 126   HDL CHOL mg/dL 42     Results from last 7 days   Lab Units 12/03/24  0430   PROBNP pg/mL 1,459.0*           Recent Radiology:  Imaging Results (Most Recent)       Procedure Component Value Units Date/Time    XR Chest 1 View [[id number]] Collected: 12/03/24 0337     Updated: 12/03/24 0340    Narrative:      XR CHEST 1 VW    Date of Exam: 12/3/2024 2:43 AM EST    Indication: Chest Pain Protocol    Comparison: 11/13/2024.    Findings:  There is no pneumothorax, pleural effusion or focal airspace consolidation. Heart size and pulmonary vasculature appear within normal limits. Regional bones appear intact.      Impression:      Impression:  No acute cardiopulmonary abnormality.        Electronically Signed: Kyle Segura MD    12/3/2024 3:38 AM EST    Workstation ID: [id number]              ECHOCARDIOGRAM:    Results for orders placed during the hospital encounter of 11/15/24    Adult Transesophageal Echo (DELANEY) W/ Cont if Necessary Per Protocol (Cardiology Department)    Interpretation Summary    Left ventricular systolic function is normal. Left ventricular ejection fraction appears to be 56 - 60%.    Left ventricular wall thickness is consistent with mild concentric hypertrophy.    Left ventricular  diastolic function was normal.    Saline test results are negative.                  Assessment:         Active Hospital Problems    Diagnosis  POA    **Atrial fibrillation with RVR [I48.91]  Yes     Atrial Flutter with RVR    2. Atrial fibrillation with RVR s/p cardioversion 11/18/2024  Anticoagulation with Eliquis   Was on toprol XL 25mg daily  TSH WNL    3. Recent h/o bronchitis / conjunctivitis    4. HTN    5. Normal LVEF     Plan:   Patient currently on diltiazem gtt   Atrial flutter is rate controlled  Recent admission with Afib new onset, s/p CV  A/c with Eliquis  On toprol XL 25mg at home  Consult EP for ablation    Patient is seen and examined and findings are verified.  All data is reviewed by me personally.  Assessment and plan formulated by APC was done after discussion with attending.  I spent more than 50% of time in taking care of the patient.    Patient is admitted with palpitation and was noted to be in atrial fibrillation with RVR.      Normal S1 and S2.  Heart rate is controlled on IV Cardizem.  Decreased breath sound.  Normal S1 and S2.  No pericardial rub or murmur    At this stage, I would increase Toprol-XL to 25 mg twice daily.  Continue Eliquis.  Once the heart rate is controlled patient can be discharged.  Patient will need possible ablation as an outpatient.  Patient follows with Dr. Garcia.    Electronically signed by Dajuan De Leon MD, 12/03/24, 2:15 PM EST.                 Dajuan De Leon MD  12/03/24  14:15 EST

## 2024-12-03 NOTE — PLAN OF CARE
Problem: Adult Inpatient Plan of Care  Goal: Plan of Care Review  Outcome: Progressing  Goal: Readiness for Transition of Care  Intervention: Mutually Develop Transition Plan  Description: Identify available resources for support (e.g., family, friends, community).  Identify and address barriers to ongoing treatment and home management (e.g., environmental, financial).  Provide opportunities to practice self-management skills.  Assess and monitor emotional readiness for transition.  Establish or reconnect linkage with outpatient providers or community-based services.  Recent Flowsheet Documentation  Taken 12/3/2024 7657 by Kellen Gonsalves, RN  Transportation Anticipated: family or friend will provide  Patient/Family Anticipated Services at Transition: none  Patient/Family Anticipates Transition to: home with family  Taken 12/3/2024 3965 by Kellen Gonsalves, RN  Equipment Currently Used at Home: none   Goal Outcome Evaluation:

## 2024-12-03 NOTE — PLAN OF CARE
Problem: Adult Inpatient Plan of Care  Goal: Plan of Care Review  12/3/2024 0638 by Kellen Gonsalves, RN  Outcome: Progressing  Flowsheets (Taken 12/3/2024 0638)  Progress: no change  Plan of Care Reviewed With: patient  12/3/2024 0601 by Kellen Gonsalves RN  Outcome: Progressing  Goal: Patient-Specific Goal (Individualized)  Outcome: Progressing  Goal: Absence of Hospital-Acquired Illness or Injury  Outcome: Progressing  Goal: Optimal Comfort and Wellbeing  Outcome: Progressing  Goal: Readiness for Transition of Care  Outcome: Progressing  Intervention: Mutually Develop Transition Plan  Description: Identify available resources for support (e.g., family, friends, community).  Identify and address barriers to ongoing treatment and home management (e.g., environmental, financial).  Provide opportunities to practice self-management skills.  Assess and monitor emotional readiness for transition.  Establish or reconnect linkage with outpatient providers or community-based services.  Recent Flowsheet Documentation  Taken 12/3/2024 0557 by Kellen Gonsalves, RN  Transportation Anticipated: family or friend will provide  Patient/Family Anticipated Services at Transition: none  Patient/Family Anticipates Transition to: home with family  Taken 12/3/2024 0550 by Kellen Gonsalves, RN  Equipment Currently Used at Home: none   Goal Outcome Evaluation:  Plan of Care Reviewed With: patient        Progress: no change

## 2024-12-03 NOTE — ED PROVIDER NOTES
Subjective   Chief Complaint   Patient presents with    Rapid Heart Rate       History of Present Illness  Patient is a 59-year-old man who reports heart palpitations that got worse today.  He has a history of A-fib and was just here approximately 2 weeks ago for A-fib RVR.  States that he has an appointment coming up with Dr. Garcia on 1212 to discuss an ablation.  States he was lying in bed when he started feeling his heart race and could hear his heart beats against his pillow.  Denies any nausea vomiting chest pain shortness of breath or other symptom or complication.  Reports compliance with all medications.  Review of Systems   Constitutional: Negative.    HENT: Negative.     Eyes: Negative.    Respiratory: Negative.     Cardiovascular:  Positive for palpitations.   Gastrointestinal: Negative.    Endocrine: Negative.    Genitourinary: Negative.    Musculoskeletal: Negative.    Skin: Negative.    Allergic/Immunologic: Negative.    Neurological: Negative.    Hematological: Negative.    Psychiatric/Behavioral: Negative.         Past Medical History:   Diagnosis Date    Hypertension        No Known Allergies    No past surgical history on file.    Family History   Problem Relation Age of Onset    Valvular heart disease Mother     Other Mother         pacemaker    Coronary artery disease Father         CABG x 2     Diabetes Sister     Obesity Sister        Social History     Socioeconomic History    Marital status:     Number of children: 1   Tobacco Use    Smoking status: Never     Passive exposure: Never    Smokeless tobacco: Never   Vaping Use    Vaping status: Never Used   Substance and Sexual Activity    Alcohol use: Yes     Comment: rare    Drug use: No    Sexual activity: Defer           Objective   Physical Exam  Vitals and nursing note reviewed.   Constitutional:       General: He is not in acute distress.     Appearance: Normal appearance. He is not ill-appearing, toxic-appearing or diaphoretic.  "  HENT:      Head: Normocephalic and atraumatic.      Right Ear: External ear normal.      Left Ear: External ear normal.      Nose: Nose normal.      Mouth/Throat:      Mouth: Mucous membranes are moist.      Pharynx: Oropharynx is clear.   Eyes:      Extraocular Movements: Extraocular movements intact.      Conjunctiva/sclera: Conjunctivae normal.      Pupils: Pupils are equal, round, and reactive to light.   Cardiovascular:      Rate and Rhythm: Regular rhythm. Tachycardia present.      Heart sounds:      Gallop present.   Pulmonary:      Effort: Pulmonary effort is normal.      Breath sounds: Normal breath sounds.   Abdominal:      General: Bowel sounds are normal.      Palpations: Abdomen is soft.   Musculoskeletal:         General: Normal range of motion.      Cervical back: Normal range of motion and neck supple.   Skin:     General: Skin is warm and dry.      Capillary Refill: Capillary refill takes less than 2 seconds.   Neurological:      General: No focal deficit present.      Mental Status: He is alert.   Psychiatric:         Mood and Affect: Mood normal.         Behavior: Behavior normal.         Thought Content: Thought content normal.         Judgment: Judgment normal.         Procedures           ED Course      /71   Pulse (!) 154   Temp 97.8 °F (36.6 °C)   Resp 18   Ht 182.9 cm (72\")   Wt 119 kg (263 lb 3.7 oz)   SpO2 94%   BMI 35.70 kg/m²   Labs Reviewed   COMPREHENSIVE METABOLIC PANEL - Abnormal; Notable for the following components:       Result Value    Glucose 119 (*)     Sodium 135 (*)     All other components within normal limits    Narrative:     GFR Normal >60  Chronic Kidney Disease <60  Kidney Failure <15     PROTIME-INR - Abnormal; Notable for the following components:    Protime 15.2 (*)     INR 1.20 (*)     All other components within normal limits   CBC WITH AUTO DIFFERENTIAL - Abnormal; Notable for the following components:    WBC 11.23 (*)     Lymphocyte % 18.0 (*)     " Immature Grans % 1.2 (*)     Neutrophils, Absolute 7.77 (*)     Monocytes, Absolute 1.01 (*)     Immature Grans, Absolute 0.14 (*)     All other components within normal limits   TROPONIN - Normal    Narrative:     High Sensitive Troponin T Reference Range:  <14.0 ng/L- Negative Female for AMI  <22.0 ng/L- Negative Male for AMI  >=14 - Abnormal Female indicating possible myocardial injury.  >=22 - Abnormal Male indicating possible myocardial injury.   Clinicians would have to utilize clinical acumen, EKG, Troponin, and serial changes to determine if it is an Acute Myocardial Infarction or myocardial injury due to an underlying chronic condition.        RAINBOW DRAW    Narrative:     The following orders were created for panel order [place] Draw.  Procedure                               Abnormality         Status                     ---------                               -----------         ------                     Green Top (Gel)[[id number]]                                  Final result               Lavender Top[[id number]]                                     Final result               Gold Top - SST[[id number]]                                   Final result               Light Blue Top[[id number]]                                   Final result                 Please view results for these tests on the individual orders.   HIGH SENSITIVITIY TROPONIN T 2HR   CBC AND DIFFERENTIAL    Narrative:     The following orders were created for panel order CBC & Differential.  Procedure                               Abnormality         Status                     ---------                               -----------         ------                     CBC Auto Differential[[id number]]        Abnormal            Final result                 Please view results for these tests on the individual orders.   GREEN TOP   LAVENDER TOP   GOLD TOP - SST   LIGHT BLUE TOP     .edds  XR Chest 1 View    Result Date: 12/3/2024  Impression: No  acute cardiopulmonary abnormality. Electronically Signed: Kyle Segura MD  12/3/2024 3:38 AM EST  Workstation ID: [id number]                                                    Medical Decision Making  Patient presents to the ED for the above complaint, underwent the above exam and workup.    EKG reviewed: EKG is interpreted by Dr. Ferraro and reviewed by myself shows atrial flutter with 2-1 conduction rate of 147.  Previous EKG from 11/18/2024 shows sinus rhythm with a probable left atrial enlargement incomplete right bundle branch block with a rate of 72.    Reviewed external records from 11/21/2024 with family medicine for hospital discharge follow-up and A-fib RVR.    Reviewed internal records dated 11/15/2024 for atrial fibrillation with RVR admission.    Differential diagnosis considered: A-fib RVR, MI, ACS    Overall presentation is consistent with a flutter    Patient was treated with the following while in the ED:  Medications   sodium chloride 0.9 % flush 10 mL (has no administration in time range)   dilTIAZem (CARDIZEM) 125 mg in 125 mL D5W infusion (10 mg/hr Intravenous Rate/Dose Change 12/3/24 0351)   aspirin chewable tablet 324 mg (243 mg Oral Given 12/3/24 0247)   dilTIAZem (CARDIZEM) bolus from bag 1 mg/mL solution 10 mg (10 mg Intravenous Bolus from Bag 12/3/24 0246)     Evaluation patient IV established labs imaging EKG were obtained.  Results as above.  Labs are essentially unremarkable initial troponin was 7 repeat troponin pending at time of admission.  CMP essentially unremarkable sodium 135 glucose 119.  PT/INR within normal limits.  CBC shows a white count of 11.23.  Patient x-ray shows no acute abnormalities.  Patient started on Cardizem drip and initially was responsive however then became tachycardic again.  Dose was increased and hospitalist provider was called for admission.  Discussed this with patient who is agreeable to admission so that he can potentially see cardiology sooner to  discuss ablation.    Discussed case with UMESH Moore, who agreed to admit patient.      Final diagnoses:   Atrial flutter, unspecified type   Rapid heart rate       ED Disposition  ED Disposition       ED Disposition   Decision to Admit    Condition   --    Comment   Level of Care: Telemetry [5]   Admitting Physician: DORYS FUNEZ [[id number]]   Attending Physician: DORYS FUNEZ [[id number]]                 No follow-up provider specified.       Medication List      No changes were made to your prescriptions during this visit.            Alycia Murray APRN  12/03/24 0413

## 2024-12-03 NOTE — CASE MANAGEMENT/SOCIAL WORK
Discharge Planning Assessment   Shreyas     Patient Name: Kenny Cruz  MRN: [id number]  Today's Date: 12/3/2024    Admit Date: 12/3/2024    Plan: From home w/family.   Discharge Needs Assessment       Row Name 12/03/24 1128       Living Environment    People in Home spouse    Name(s) of People in Home Wife- Sharonda    Current Living Arrangements home    Potentially Unsafe Housing Conditions none    In the past 12 months has the electric, gas, oil, or water company threatened to shut off services in your home? No    Primary Care Provided by self    Provides Primary Care For child(zak)    Caregiving Concerns Daughter in the home.    Family Caregiver if Needed spouse    Family Caregiver Names Wife- Sharonda    Quality of Family Relationships helpful;involved;supportive    Able to Return to Prior Arrangements yes       Resource/Environmental Concerns    Resource/Environmental Concerns none    Transportation Concerns none       Transportation Needs    In the past 12 months, has lack of transportation kept you from medical appointments or from getting medications? no    In the past 12 months, has lack of transportation kept you from meetings, work, or from getting things needed for daily living? No       Food Insecurity    Within the past 12 months, you worried that your food would run out before you got the money to buy more. Never true    Within the past 12 months, the food you bought just didn't last and you didn't have money to get more. Never true       Transition Planning    Patient/Family Anticipates Transition to home with family    Patient/Family Anticipated Services at Transition none    Transportation Anticipated family or friend will provide       Discharge Needs Assessment    Readmission Within the Last 30 Days no previous admission in last 30 days    Equipment Currently Used at Home none    Concerns to be Addressed discharge planning    Anticipated Changes Related to Illness none    Equipment Needed After  Discharge none                   Discharge Plan       Row Name 12/03/24 1130       Plan    Plan From home w/family.    Patient/Family in Agreement with Plan yes    Plan Comments Patient lives at home with his wife, Sharonda. Patient does drive and his wife will transport at discharge. Patient can do ADL. PCP ([facility]) and pharmacy ([facility]) confirmed. Patient denies finanical assistance needs with medications and/or food. Denies PT and/or HH needs. Discharge barriers: cardio following, cardizem gtt, electrolyte management, increased PT/INR, increased WBC, trend BP.                  Continued Care and Services - Admitted Since 12/3/2024    No active coordination exists for this encounter.          Demographic Summary       Row Name 12/03/24 1128       General Information    Admission Type inpatient    Arrived From emergency department    Referral Source admission list    Reason for Consult discharge planning    Preferred Language English       Contact Information    Permission Granted to Share Info With                    Functional Status       Row Name 12/03/24 1128       Functional Status    Usual Activity Tolerance good    Current Activity Tolerance good       Functional Status, IADL    Medications independent    Meal Preparation independent    Housekeeping independent    Laundry independent    Shopping independent                 Met with patient in room wearing PPE: mask.    Maintained distance greater than six feet and spent less than 15 minutes in the room.       Anneliese Arguello RN

## 2024-12-03 NOTE — H&P
[facility] Medicine Services  History & Physical    Patient Name: Kenny Cruz  : 1965  MRN: [id number]  Primary Care Physician:  Cherelle Bernabe APRN  Date of admission: 12/3/2024  Date and Time of Service: 12/3/2024 at 0400    Subjective      Chief Complaint: fast heartrate    History of Present Illness: Kenny Cruz is a 59 y.o. male with a CMH of HTN, recently new atrial fibrillation on eliquis who presented to [facility] on 12/3/2024 with tachycardia. Lives at home with wife, independent with ADLs. Last hospitalization 11/ for new onset atrial fibrillation/flutter with RVR. Underwent DELANEY with cardioversion on  with conversion to NSR. Discharged home with metoprolol and eliquis. Also with bronchitis and acute conjunctivitis treated with amoxicillin and gentamicin eye drops. Was to follow up with EP Dr. Garcia on .   Presented to ED tonight for tachycardia. States stopped drinking caffeine after discharge. Over past 2 days with 2 glasses of soda. Endorses compliance with home metoprolol and eliquis. States was lying in bed when hear heartbeat against pillow. Home BP monitoring revealed 's. Denied CP, dyspnea, palpitations. On arrival to ED VS: 97.2-116-/94-94% room air. Was initiated on diltiazem infusion and medicated with  mg po.     Upon evaluation, awake, alert, resting in bed, wife at bedside. Current VS: 128-/74-95% room air. Bedside heart monitoring Afib. Denies CP, dyspnea, diaphoresis.   EKG reveals atrial tachycardia. Rate 147. . Qtc 446  CXR negative  Blood work reveals WBC 11.23, Hgb 13.2, Hct 41.7, sodium 135, troponin negative at 7, INR 1.2, PT 15.2      Review of Systems   Constitutional:  Positive for fatigue. Negative for chills and fever.   Respiratory:  Negative for shortness of breath.    Cardiovascular:  Positive for palpitations. Negative for chest pain.   Gastrointestinal:  Negative for abdominal pain.    Genitourinary:  Negative for dysuria.       Personal History     Past Medical History:   Diagnosis Date    Hypertension        No past surgical history on file.    Family History: family history includes Coronary artery disease in his father; Diabetes in his sister; Obesity in his sister; Other in his mother; Valvular heart disease in his mother. Otherwise pertinent FHx was reviewed and not pertinent to current issue.    Social History:  reports that he has never smoked. He has never been exposed to tobacco smoke. He has never used smokeless tobacco. He reports current alcohol use. He reports that he does not use drugs.    Home Medications:  Prior to Admission Medications       Prescriptions Last Dose Informant Patient Reported? Taking?    acetaminophen (TYLENOL) 500 MG tablet   Yes No    Take 1 tablet by mouth Every 6 (Six) Hours As Needed for Mild Pain.    apixaban (ELIQUIS) 5 MG tablet tablet   No No    Take 1 tablet by mouth Every 12 (Twelve) Hours for 90 days. Indications: Atrial Fibrillation    metoprolol succinate XL (TOPROL-XL) 25 MG 24 hr tablet   No No    Take 1 tablet by mouth Daily for 90 days.              Allergies:  No Known Allergies    Objective      Vitals:   Temp:  [97.8 °F (36.6 °C)] 97.8 °F (36.6 °C)  Heart Rate:  [] 118  Resp:  [18] 18  BP: ()/(61-94) 104/69  Body mass index is 35.7 kg/m².  Physical Exam  Constitutional:       Appearance: Normal appearance.   HENT:      Head: Normocephalic and atraumatic.      Mouth/Throat:      Mouth: Mucous membranes are moist.   Eyes:      General: No scleral icterus.     Extraocular Movements: Extraocular movements intact.      Pupils: Pupils are equal, round, and reactive to light.   Cardiovascular:      Rate and Rhythm: Tachycardia present. Rhythm irregular.      Pulses: Normal pulses.      Heart sounds: Normal heart sounds.      Comments: Denies chest pain or reproducible CP   Pulmonary:      Effort: Pulmonary effort is normal.      Breath  sounds: Normal breath sounds.   Abdominal:      General: Bowel sounds are normal. There is no distension.      Palpations: Abdomen is soft.      Tenderness: There is no abdominal tenderness.   Musculoskeletal:      Right lower leg: Edema present.      Left lower leg: Edema present.      Comments: 1+ pitting edema bilateral ankles.    Skin:     General: Skin is warm and dry.   Neurological:      Mental Status: He is alert.         Diagnostic Data:  Lab Results (last 24 hours)       Procedure Component Value Units Date/Time    High Sensitivity Troponin T 2Hr [[id number]] Collected: 12/03/24 0430    Specimen: Blood from Arm, Left Updated: 12/03/24 0432    Comprehensive Metabolic Panel [[id number]]  (Abnormal) Collected: 12/03/24 0230    Specimen: Blood Updated: 12/03/24 0300     Glucose 119 mg/dL      BUN 20 mg/dL      Creatinine 1.01 mg/dL      Sodium 135 mmol/L      Potassium 4.0 mmol/L      Chloride 99 mmol/L      CO2 26.2 mmol/L      Calcium 9.5 mg/dL      Total Protein 6.7 g/dL      Albumin 3.9 g/dL      ALT (SGPT) 33 U/L      AST (SGOT) 20 U/L      Alkaline Phosphatase 58 U/L      Total Bilirubin 0.3 mg/dL      Globulin 2.8 gm/dL      A/G Ratio 1.4 g/dL      BUN/Creatinine Ratio 19.8     Anion Gap 9.8 mmol/L      eGFR 86.7 mL/min/1.73     Narrative:      GFR Normal >60  Chronic Kidney Disease <60  Kidney Failure <15      High Sensitivity Troponin T [[id number]]  (Normal) Collected: 12/03/24 0230    Specimen: Blood Updated: 12/03/24 0300     HS Troponin T 7 ng/L     Narrative:      High Sensitive Troponin T Reference Range:  <14.0 ng/L- Negative Female for AMI  <22.0 ng/L- Negative Male for AMI  >=14 - Abnormal Female indicating possible myocardial injury.  >=22 - Abnormal Male indicating possible myocardial injury.   Clinicians would have to utilize clinical acumen, EKG, Troponin, and serial changes to determine if it is an Acute Myocardial Infarction or myocardial injury due to an underlying chronic condition.          [place] Draw [[id number]] Collected: 12/03/24 0230    Specimen: Blood Updated: 12/03/24 0245    Narrative:      The following orders were created for panel order [place] Draw.  Procedure                               Abnormality         Status                     ---------                               -----------         ------                     Green Top (Gel)[[id number]]                                  Final result               Lavender Top[[id number]]                                     Final result               Gold Top - SST[[id number]]                                   Final result               Light Blue Top[[id number]]                                   Final result                 Please view results for these tests on the individual orders.    Green Top (Gel) [[id number]] Collected: 12/03/24 0230    Specimen: Blood Updated: 12/03/24 0245     Extra Tube Hold for add-ons.     Comment: Auto resulted.       Lavender Top [[id number]] Collected: 12/03/24 0230    Specimen: Blood Updated: 12/03/24 0245     Extra Tube hold for add-on     Comment: Auto resulted       Gold Top - SST [[id number]] Collected: 12/03/24 0230    Specimen: Blood Updated: 12/03/24 0245     Extra Tube Hold for add-ons.     Comment: Auto resulted.       Light Blue Top [[id number]] Collected: 12/03/24 0230    Specimen: Blood Updated: 12/03/24 0245     Extra Tube Hold for add-ons.     Comment: Auto resulted       Protime-INR [[id number]]  (Abnormal) Collected: 12/03/24 0230    Specimen: Blood Updated: 12/03/24 0245     Protime 15.2 Seconds      INR 1.20    CBC & Differential [[id number]]  (Abnormal) Collected: 12/03/24 0230    Specimen: Blood Updated: 12/03/24 0235    Narrative:      The following orders were created for panel order CBC & Differential.  Procedure                               Abnormality         Status                     ---------                               -----------         ------                     CBC Auto  Differential[[id number]]        Abnormal            Final result                 Please view results for these tests on the individual orders.    CBC Auto Differential [[id number]]  (Abnormal) Collected: 12/03/24 0230    Specimen: Blood Updated: 12/03/24 0235     WBC 11.23 10*3/mm3      RBC 4.55 10*6/mm3      Hemoglobin 13.2 g/dL      Hematocrit 41.7 %      MCV 91.6 fL      MCH 29.0 pg      MCHC 31.7 g/dL      RDW 14.0 %      RDW-SD 46.7 fl      MPV 9.9 fL      Platelets 374 10*3/mm3      Neutrophil % 69.3 %      Lymphocyte % 18.0 %      Monocyte % 9.0 %      Eosinophil % 2.0 %      Basophil % 0.5 %      Immature Grans % 1.2 %      Neutrophils, Absolute 7.77 10*3/mm3      Lymphocytes, Absolute 2.02 10*3/mm3      Monocytes, Absolute 1.01 10*3/mm3      Eosinophils, Absolute 0.23 10*3/mm3      Basophils, Absolute 0.06 10*3/mm3      Immature Grans, Absolute 0.14 10*3/mm3      nRBC 0.0 /100 WBC              Imaging Results (Last 24 Hours)       Procedure Component Value Units Date/Time    XR Chest 1 View [[id number]] Collected: 12/03/24 0337     Updated: 12/03/24 0340    Narrative:      XR CHEST 1 VW    Date of Exam: 12/3/2024 2:43 AM EST    Indication: Chest Pain Protocol    Comparison: 11/13/2024.    Findings:  There is no pneumothorax, pleural effusion or focal airspace consolidation. Heart size and pulmonary vasculature appear within normal limits. Regional bones appear intact.      Impression:      Impression:  No acute cardiopulmonary abnormality.        Electronically Signed: Kyle Segura MD    12/3/2024 3:38 AM EST    Workstation ID: [id number]              Assessment & Plan        This is a 59 y.o. male with PMH of HTN and recently diagnosed Afib with RVR on 11/15 s/p DELANEY with cardioversion to NSR on 11/18. Discharged home on metoprolol and eliquis. Returns to ED tonight with tachycardia. Work up revealed afib/flutter with RVR. Initiated on diltiazem infusion in ED. Will require admission for treatment of RVR and  cardiology consultation.         Active and Resolved Problems  Active Hospital Problems    Diagnosis  POA    **Atrial fibrillation with RVR [I48.91]  Yes      Resolved Hospital Problems   No resolved problems to display.     Atrial Fibrillation/Flutter with RVR  -EKG: atrial tachycardia. Rate 147. . Qtc 446  -serial troponins pending  -TSH 2.8 on 11/15/2024  -s/p DELANEY with cardioversion on 11/18: EF 56-60%, mild concentric hypertrophy. Saline test negative. Post cardioversion displaced NSR.   -continue telemetry monitoring  -s/p diltiazem 10 mg IVP x 1, initiated on diltiazem infusion. Titrate for HR < 90.   -[id number] score 1, will continue eliquis.   -cardiology consult for further evaluation and management  -will likely need EP consult in AM.     Hypertension:  -trend BP  -continue metoprolol               VTE Prophylaxis:  Mechanical VTE prophylaxis orders are present.        The patient desires to be as follows:    CODE STATUS:    Level Of Support Discussed With: Patient  Code Status (Patient has no pulse and is not breathing): CPR (Attempt to Resuscitate)  Medical Interventions (Patient has pulse or is breathing): Full Support        Admission Status:  I believe this patient meets IP status.    Expected Length of Stay: 3 days    PDMP and Medication Dispenses via Sidebar reviewed and consistent with patient reported medications.    I discussed the patient's findings and my recommendations with patient, family, and nursing staff.      Signature:     This document has been electronically signed by UMESH Camara on December 3, 2024 04:39 [facility]

## 2024-12-03 NOTE — CONSULTS
"       Name: Kenny Cruz ADMIT: 12/3/2024   : 1965  PCP: Cherelle Bernabe APRN    MRN: [id number] LOS: 0 days   AGE/SEX: 59 y.o. male  ROOM: 34/34     Chief Complaint   Patient presents with    Rapid Heart Rate       Subjective        History of present illness  Kenny Cruz is a 59 year old male with a past medical history of hypertension. He was seen by Dr. Garcia on 11/15/2024 for new onset atrial fibrillation. He was started on Eliquis and cardioverted during that hospitalization. Multaq was started but had to be discontinued due to nocturnal bradycardia. Beta blocker was started instead and he was discharged home.     Last night patient reports \"hearing\" his heart beat as he was trying to sleep. He took his blood pressure and his heart rate was in the 150s. In the ED, he was noted to be in atrial flutter. He denies shortness of breath, chest pain, palpitations.       Past Medical History:   Diagnosis Date    Hypertension      History reviewed. No pertinent surgical history.  Family History   Problem Relation Age of Onset    Valvular heart disease Mother     Other Mother         pacemaker    Coronary artery disease Father         CABG x 2     Diabetes Sister     Obesity Sister      Social History     Tobacco Use    Smoking status: Never     Passive exposure: Never    Smokeless tobacco: Never   Vaping Use    Vaping status: Never Used   Substance Use Topics    Alcohol use: Yes     Comment: rare    Drug use: No     (Not in a hospital admission)    Allergies:  Patient has no known allergies.    Review of systems    Constitutional: Negative.    Respiratory and cardiovascular: As detailed in HPI section.  Gastrointestinal: Negative for constipation, nausea and vomiting negative for abdominal distention, abdominal pain and diarrhea.   Genitourinary: Negative for difficulty urinating and flank pain.   Musculoskeletal: Negative for arthralgias, joint swelling and myalgias.   Skin: Negative for color change, " rash and wound.   Neurological: Negative for dizziness, syncope, weakness and headaches.   Hematological: Negative for adenopathy.   Psychiatric/Behavioral: Negative for confusion.   All other systems reviewed and are negative.       Physical Exam  VITALS REVIEWED    General:      well developed, in no acute distress.    Head:      normocephalic and atraumatic.    Eyes:      PERRL/EOM intact, conjunctiva and sclera clear with out nystagmus.    Neck:      no masses, thyromegaly,  trachea central with normal respiratory effort and PMI displaced laterally  Lungs:      Clear to auscultation bilaterally  Heart:       Regularly irregular heart rhythm  Msk:      no deformity or scoliosis noted of thoracic or lumbar spine.    Pulses:      pulses normal in all 4 extremities.    Extremities:       No lower extremity edema.    Neurologic:      no focal deficits.   alert oriented x3  Skin:      intact without lesions or rashes.    Psych:      alert and cooperative; normal mood and affect; normal attention span and concentration.      Result Review :    The following data was reviewed by: UMESH Kaufman on 12/03/2024:  Common labs          11/18/2024    04:35 11/19/2024    04:35 12/3/2024    02:30 12/3/2024    04:30   Common Labs   Glucose 99  122  119     BUN 23  20  20     Creatinine 1.31  0.98  1.01     Sodium 134  135  135     Potassium 3.9  4.4  4.0     Chloride 97  100  99     Calcium 8.8  8.9  9.5     Albumin   3.9     Total Bilirubin   0.3     Alkaline Phosphatase   58     AST (SGOT)   20     ALT (SGPT)   33     WBC 10.25  10.72  11.23     Hemoglobin 12.3  12.1  13.2     Hematocrit 37.5  35.6  41.7     Platelets 221  236  374     Total Cholesterol    142    Triglycerides    126    HDL Cholesterol    42    LDL Cholesterol     77    Hemoglobin A1C   6.21       CMP          11/18/2024    04:35 11/19/2024    04:35 12/3/2024    02:30   CMP   Glucose 99  122  119    BUN 23  20  20    Creatinine 1.31  0.98  1.01    EGFR  63.5  89.9  86.7    Sodium 134  135  135    Potassium 3.9  4.4  4.0    Chloride 97  100  99    Calcium 8.8  8.9  9.5    Total Protein   6.7    Albumin   3.9    Globulin   2.8    Total Bilirubin   0.3    Alkaline Phosphatase   58    AST (SGOT)   20    ALT (SGPT)   33    Albumin/Globulin Ratio   1.4    BUN/Creatinine Ratio 17.6  20.4  19.8    Anion Gap 10.2  9.4  9.8      CBC w/diff          11/18/2024    04:35 11/19/2024    04:35 12/3/2024    02:30   CBC w/Diff   WBC 10.25  10.72  11.23    RBC 4.19  3.96  4.55    Hemoglobin 12.3  12.1  13.2    Hematocrit 37.5  35.6  41.7    MCV 89.5  89.9  91.6    MCH 29.4  30.6  29.0    MCHC 32.8  34.0  31.7    RDW 13.3  13.2  14.0    Platelets 221  236  374    Neutrophil Rel % 70.3  72.9  69.3    Immature Granulocyte Rel % 0.5  1.4  1.2    Lymphocyte Rel % 12.5  12.1  18.0    Monocyte Rel % 16.4  13.0  9.0    Eosinophil Rel % 0.1  0.2  2.0    Basophil Rel % 0.2  0.4  0.5      Lipid Panel          12/3/2024    04:30   Lipid Panel   Total Cholesterol 142    Triglycerides 126    HDL Cholesterol 42    VLDL Cholesterol 23    LDL Cholesterol  77    LDL/HDL Ratio 1.78      TSH          11/15/2024    17:03   TSH   TSH 2.800      Most Recent A1C          12/3/2024    02:30   HGBA1C Most Recent   Hemoglobin A1C 6.21        Pertinent cardiac workup    EKG 12/03/2024, atrial flutter with rapid rate, 147 bpm  EKG 11/18/2024, sinus rhythm, RBBB, rate 72 bpm  DELANEY 11/18/2024, EF 56-60%, LV diastolic function normal  Cardioversion 11/18/2024, return to sinus rhythm        Assessment and Plan         Atrial fibrillation with RVR  Hypertension  Atrial flutter    Atrial flutter  -flutter on tele  -wean cardizem drip, change to PO  -continue beta blocker  -serial troponin negative  -proBNP 1459  -on Eliquis  -discussed cardioversion and ablation; patient agreeable to both procedures    Hypertension  -continue beta blocker      No follow-ups on file.  Patient was given instructions and counseling  regarding his condition or for health maintenance advice. Please see specific information pulled into the AVS if appropriate.     Alice Hessenter, UMESH  12/03/2024  03:04 PM EST      Addendum    Mr. Cruz was seen and examined today, I agree with the note outlined by nurse practitioner Alice Regalado and I would add the following.    Patient is a 59-year-old male with no apparent cardiac issues, presented to the hospital on November 15, 2024 with atrial flutter with rapid ventricular rates and cardioversion was performed on the 18th.  He was then discharged home on Toprol and Eliquis.    Patient presented back to the hospital with atrial flutter again with rapid ventricular rates, highly symptomatic.  He was started on IV Cardizem and likely self converted to sinus rhythm.    Patient will need to be scheduled for outpatient ablation.  He was seen by Dr. Garcia during previous admission and I will have his office schedule outpatient flutter ablation with them.  In the meantime in addition to the metoprolol and Eliquis we can start him on amiodarone to help maintain sinus rhythm until the procedure is done.  Patient is agreeable with the plan.      Electronically signed by Bj Lobato MD, 12/03/24, 5:26 PM EST.

## 2024-12-04 ENCOUNTER — READMISSION MANAGEMENT (OUTPATIENT)
Dept: CALL CENTER | Facility: HOSPITAL | Age: 59
End: 2024-12-04
Payer: COMMERCIAL

## 2024-12-04 ENCOUNTER — PREP FOR SURGERY (OUTPATIENT)
Dept: OTHER | Facility: HOSPITAL | Age: 59
End: 2024-12-04
Payer: COMMERCIAL

## 2024-12-04 VITALS
OXYGEN SATURATION: 96 % | WEIGHT: 252.21 LBS | HEART RATE: 63 BPM | HEIGHT: 72 IN | BODY MASS INDEX: 34.16 KG/M2 | RESPIRATION RATE: 16 BRPM | TEMPERATURE: 97.3 F | SYSTOLIC BLOOD PRESSURE: 101 MMHG | DIASTOLIC BLOOD PRESSURE: 55 MMHG

## 2024-12-04 DIAGNOSIS — I48.92 ATRIAL FLUTTER, UNSPECIFIED TYPE: Primary | ICD-10-CM

## 2024-12-04 PROCEDURE — G0378 HOSPITAL OBSERVATION PER HR: HCPCS

## 2024-12-04 RX ORDER — ACETAMINOPHEN 325 MG/1
650 TABLET ORAL EVERY 4 HOURS PRN
OUTPATIENT
Start: 2024-12-04

## 2024-12-04 RX ORDER — SODIUM CHLORIDE 0.9 % (FLUSH) 0.9 %
10 SYRINGE (ML) INJECTION AS NEEDED
OUTPATIENT
Start: 2024-12-04

## 2024-12-04 RX ORDER — NITROGLYCERIN 0.4 MG/1
0.4 TABLET SUBLINGUAL
Qty: 30 TABLET | Refills: 12 | Status: SHIPPED | OUTPATIENT
Start: 2024-12-04

## 2024-12-04 RX ORDER — SODIUM CHLORIDE 0.9 % (FLUSH) 0.9 %
10 SYRINGE (ML) INJECTION EVERY 12 HOURS SCHEDULED
OUTPATIENT
Start: 2024-12-04

## 2024-12-04 RX ORDER — AMIODARONE HYDROCHLORIDE 200 MG/1
200 TABLET ORAL EVERY 12 HOURS SCHEDULED
Qty: 60 TABLET | Refills: 0 | Status: SHIPPED | OUTPATIENT
Start: 2024-12-04 | End: 2024-12-12

## 2024-12-04 RX ORDER — ONDANSETRON 2 MG/ML
4 INJECTION INTRAMUSCULAR; INTRAVENOUS EVERY 6 HOURS PRN
OUTPATIENT
Start: 2024-12-04

## 2024-12-04 RX ADMIN — APIXABAN 5 MG: 5 TABLET, FILM COATED ORAL at 08:05

## 2024-12-04 RX ADMIN — Medication 10 ML: at 08:05

## 2024-12-04 RX ADMIN — METOPROLOL SUCCINATE 25 MG: 25 TABLET, EXTENDED RELEASE ORAL at 08:05

## 2024-12-04 RX ADMIN — AMIODARONE HYDROCHLORIDE 200 MG: 200 TABLET ORAL at 08:05

## 2024-12-04 NOTE — CASE MANAGEMENT/SOCIAL WORK
Case Management Discharge Note      Final Note: Routine home         Selected Continued Care - Discharged on 12/4/2024 Admission date: 12/3/2024 - Discharge disposition: Home or Self Care               Transportation Services  Private: Car    Final Discharge Disposition Code: 01 - home or self-care

## 2024-12-04 NOTE — CASE MANAGEMENT/SOCIAL WORK
Continued Stay Note  MALIA Pritchett     Patient Name: Kenny Cruz  MRN: [id number]  Today's Date: 12/4/2024    Admit Date: 12/3/2024    Plan: Home with family   Discharge Plan       Row Name 12/04/24 1130       Plan    Plan Home with family    Patient/Family in Agreement with Plan yes    Plan Comments CM met with patient at bedside to complete RA assessment. Pt plans on d/c home with family and f/u with Cardiology for outpt ablation. RA completed 12/4. Pt will d/c later today.             Expected Discharge Date and Time       Expected Discharge Date Expected Discharge Time    Dec 4, 2024         Rose Reyna RN    phone [phone]  fax [phone]

## 2024-12-04 NOTE — OUTREACH NOTE
Prep Survey      Flowsheet Row Responses   [facility] patient discharged from? [facility]   Is LACE score < 7 ? No   Eligibility [facility]   Date of Admission 12/03/24   Date of Discharge 12/04/24   Discharge Disposition Home or Self Care   Discharge diagnosis Atrial fibrillation with RVR   Does the patient have one of the following disease processes/diagnoses(primary or secondary)? Other   Does the patient have Home health ordered? No   Is there a DME ordered? No   Prep survey completed? Yes            Jerilyn SAMUEL - Registered Nurse

## 2024-12-04 NOTE — SIGNIFICANT NOTE
Case Management Readmission Assessment Note    Case Management Readmission Assessment (all recorded)       Readmission Interview       Row Name 12/04/24 1235             Readmission Indications    Is the patient and/or family able to complete the readmission assessment questions? Yes      Is this hospitalization related to the prior hospital diagnosis? Yes        Row Name 12/04/24 1235             Recommendation for rehospitalization    Did you speak with your physician prior to coming to the hospital No        Row Name 12/04/24 1235             Follow-up Appointments    Do you have a PCP? Yes      Did you have an appointment with PCP after your hospitalization? Yes      Did you go to appointment? No      Did you have an appointment with a Specialist? Yes      When was your appointment scheduled? --  pt readmitted prior to f/u appt      Did you go to appointment? No  pt readmitted prior to f/u appt      Are you current with the Pulmonary Clinic? No      Are you current with the CHF Clinic? No        Row Name 12/04/24 1235             Medications    Did you have newly prescribed medications at discharge? Yes  Eliquis and Metoprolol succinate XL      Did you understand the reasons for your medications at discharge and how to take them? Yes      Did you understand the side effects of your medications? Yes      Are you taking all of you prescribed medications? Yes      What pharmacy was used to fill prescription(s)? [facility]      Were medications picked up? Yes        Row Name 12/04/24 1235             Discharge Instructions    Did you understand your discharge instructions? Yes      Did your family/caregiver hear your instructions? Yes      Were you told to eat a special diet? No      Were you given a number of someone to call if you had questions or concerns? Yes        Row Name 12/04/24 1235             Index discharge location/services    Where did you go upon discharge? Home      Do you have supportive family or friends  in the home? Yes        Row Name 12/04/24 1235             Discharge Readiness    On a scale of 1-5 (5 being well prepared), how ready were you for discharge 5        Row Name 12/04/24 1235             Palliative Care/Hospice    Are you current with Palliative Care? No      Are you current with Hospice Care? No        Row Name 12/04/24 1235 12/03/24 0551 12/03/24 0548       Advance Directives (For Healthcare)    Pre-existing AND/MOST/POLST Order No No No    Advance Directive Status Patient does not have advance directive Patient does not have advance directive --    Have you reviewed your Advance Directive and is it valid for this stay? Not applicable No Not applicable    Literature Provided on Advance Directives No No No    Patient Requests Assistance on Advance Directives Patient Declined -- --      Row Name 12/04/24 1235             Readmission Assessment Final Comments    Final Comments PREVIOUS 11/15-11/19/2024 new onset Afib w/RVS, Brochitis. 11/18 DELANEY with cardioversion converted to NSR. Bronchitis treated with amoxicillin. D/C home with metoprolol and eliquis. D/C with f/u with Dr. Garcia on 12/12 for outpt Ablation. CURRENT: Afib/flutter with RVR. Compliant with Eliquis and Metoprolol since dc on 11/19. IV Cardizem rate controlled converted to NSR. Pt started on Amio po to maintain NSR. F/U appt w/Cardio on 12/12 for outpt Ablation. f/u w/PCP 1 week. (P)

## 2024-12-04 NOTE — PROGRESS NOTES
LOS: 0 days   Admitting Physician- No att. providers found    Reason For Followup:    Palpitation  Atrial fibrillation paroxysmal  Hypertension    Subjective     Patient is feeling better.    Objective     Patient converted into sinus rhythm    Review of Systems:   Review of Systems   Constitutional: Negative for chills and fever.   HENT:  Negative for ear discharge and nosebleeds.    Eyes:  Negative for discharge and redness.   Cardiovascular:  Negative for chest pain, orthopnea, palpitations, paroxysmal nocturnal dyspnea and syncope.   Respiratory:  Negative for cough, shortness of breath and wheezing.    Endocrine: Negative for heat intolerance.   Skin:  Negative for rash.   Musculoskeletal:  Negative for arthritis and myalgias.   Gastrointestinal:  Negative for abdominal pain, melena, nausea and vomiting.   Genitourinary:  Negative for dysuria and hematuria.   Neurological:  Negative for dizziness, light-headedness, numbness and tremors.   Psychiatric/Behavioral:  Negative for depression. The patient is not nervous/anxious.          Vital Signs  Vitals:    12/03/24 1900 12/04/24 0002 12/04/24 0345 12/04/24 1100   BP: 107/71 108/77 104/79 101/55   BP Location: Left arm Left arm Left arm Right arm   Patient Position: Lying Lying Lying Lying   Pulse: 58 62 65 63   Resp: 16 15 21 16   Temp: 97.4 °F (36.3 °C) 97.3 °F (36.3 °C) 97.5 °F (36.4 °C) 97.3 °F (36.3 °C)   TempSrc: Oral Oral Oral Oral   SpO2: 93% 96% 96% 96%   Weight:   114 kg (252 lb 3.3 oz)    Height:         Wt Readings from Last 1 Encounters:   12/04/24 114 kg (252 lb 3.3 oz)     No intake or output data in the 24 hours ending 12/04/24 1554  Physical Exam:  Constitutional:       Appearance: Well-developed.   Eyes:      General: No scleral icterus.        Right eye: No discharge.   HENT:      Head: Normocephalic and atraumatic.   Neck:      Thyroid: No thyromegaly.      Lymphadenopathy: No cervical adenopathy.   Pulmonary:      Effort: Pulmonary effort  is normal. No respiratory distress.      Breath sounds: Normal breath sounds. No wheezing. No rales.   Cardiovascular:      Normal rate. Regular rhythm.      No gallop.    Edema:     Peripheral edema absent.   Abdominal:      Tenderness: There is no abdominal tenderness.   Skin:     Findings: No erythema or rash.   Neurological:      Mental Status: Alert and oriented to person, place, and time.         Results Review:   Lab Results (last 24 hours)       ** No results found for the last 24 hours. **          Imaging Results (Last 72 Hours)       Procedure Component Value Units Date/Time    XR Chest 1 View [[id number]] Collected: 12/03/24 0337     Updated: 12/03/24 0340    Narrative:      XR CHEST 1 VW    Date of Exam: 12/3/2024 2:43 AM EST    Indication: Chest Pain Protocol    Comparison: 11/13/2024.    Findings:  There is no pneumothorax, pleural effusion or focal airspace consolidation. Heart size and pulmonary vasculature appear within normal limits. Regional bones appear intact.      Impression:      Impression:  No acute cardiopulmonary abnormality.        Electronically Signed: Kyle Segura MD    12/3/2024 3:38 AM EST    Workstation ID: [id number]          ECG/EMG Results (most recent)       Procedure Component Value Units Date/Time    ECG 12 Lead Tachycardia [[id number]] Collected: 12/03/24 0211     Updated: 12/03/24 0604     QT Interval 285 ms      QTC Interval 446 ms     Narrative:      HEART YZNI=760  bpm  RR Howmhwrf=766  ms  PA Gxbysvbz=441  ms  P Horizontal Axis=206  deg  P Front Axis=245  deg  QRSD Interval=91  ms  QT Zovmrywf=651  ms  TVqD=970  ms  QRS Axis=171  deg  T Wave Axis=Invalid  deg  - ABNORMAL ECG -  ARTIFACT FLUTTER  Right axis deviation  Incomplete right bundle branch block  Repol abnrm suggests ischemia, diffuse leads  When compared with ECG of 18-Nov-2024 11:15:25,  Significant change in rhythm: previously sinus  New or worsened ischemia or infarction  Significant repolarization  change  Significant axis, voltage or hypertrophy change  Electronically Signed By: Stewart Ferraro (NATE) 2024-12-03 06:04:06  Date and Time of Study:2024-12-03 02:11:44    Telemetry Scan [[id number]] Resulted: 12/03/24     Updated: 12/03/24 2145    Telemetry Scan [[id number]] Resulted: 12/03/24     Updated: 12/04/24 0020          CBC    Results from last 7 days   Lab Units 12/03/24  0230   WBC 10*3/mm3 11.23*   HEMOGLOBIN g/dL 13.2   PLATELETS 10*3/mm3 374     BMP   Results from last 7 days   Lab Units 12/03/24  0230   SODIUM mmol/L 135*   POTASSIUM mmol/L 4.0   CHLORIDE mmol/L 99   CO2 mmol/L 26.2   BUN mg/dL 20   CREATININE mg/dL 1.01   GLUCOSE mg/dL 119*     CMP   Results from last 7 days   Lab Units 12/03/24  0230   SODIUM mmol/L 135*   POTASSIUM mmol/L 4.0   CHLORIDE mmol/L 99   CO2 mmol/L 26.2   BUN mg/dL 20   CREATININE mg/dL 1.01   GLUCOSE mg/dL 119*   ALBUMIN g/dL 3.9   BILIRUBIN mg/dL 0.3   ALK PHOS U/L 58   AST (SGOT) U/L 20   ALT (SGPT) U/L 33     Cardiac Studies:  Echo- Results for orders placed during the hospital encounter of 11/15/24    Adult Transesophageal Echo (DELANEY) W/ Cont if Necessary Per Protocol (Cardiology Department)    Interpretation Summary    Left ventricular systolic function is normal. Left ventricular ejection fraction appears to be 56 - 60%.    Left ventricular wall thickness is consistent with mild concentric hypertrophy.    Left ventricular diastolic function was normal.    Saline test results are negative.    Stress Myoview-  Cath-      Medication Review:   Scheduled Meds:  Continuous Infusions:No current facility-administered medications for this encounter.    PRN Meds:.      Assessment & Plan     Atrial fibrillation with RVR    MDM:    1.  Paroxysmal atrial fibrillation:    Patient was started on amiodarone.  Patient converted into sinus rhythm.  Patient is on Eliquis.  Patient can be discharged today and follow-up with Ryleylla and possible ablation in future    2.   Hypertension:    Blood pressure is controlled current treatment would be continued    3.  Mixed hyperlipidemia:    Repeat lipid panel testing.  Recent LDL was 77 which is desirable    Dajuan De Leon MD  12/04/24  15:54 EST

## 2024-12-04 NOTE — DISCHARGE SUMMARY
[facility] Medicine Services  Discharge Summary    Date of Service: 2024  Patient Name: Kenny Cruz  : 1965  MRN: [id number]    Date of Admission: 12/3/2024  Discharge Diagnosis: Atrial fibrillation with RVR  Date of Discharge: 2024  Primary Care Physician: Cherelle Bernabe APRN      Presenting Problem:   Rapid heart rate [R00.0]  Atrial fibrillation with RVR [I48.91]  Atrial flutter, unspecified type [I48.92]    Active and Resolved Hospital Problems:  Active Hospital Problems    Diagnosis POA    **Atrial fibrillation with RVR [I48.91] Yes      Resolved Hospital Problems   No resolved problems to display.       Atrial Fibrillation/Flutter with RVR  -EKG: atrial tachycardia. Rate 147. . Qtc 446  -serial troponins pending  -TSH 2.8 on 11/15/2024  -s/p DELANEY with cardioversion on : EF 56-60%, mild concentric hypertrophy. Saline test negative. Post cardioversion displaced NSR.   -telemetry monitoring  -s/p diltiazem 10 mg IVP x 1, initiated on diltiazem infusion. Titrate for HR < 90.   -[id number] score 1, will continue eliquis.   -cardiology consult for further evaluation and management  -EP consulted    Hospital Course     History of Present Illness: Kenny Cruz is a 59 y.o. male with a CMH of HTN, recently new atrial fibrillation on eliquis who presented to [facility] on 12/3/2024 with tachycardia. Lives at home with wife, independent with ADLs. Last hospitalization 11/ for new onset atrial fibrillation/flutter with RVR. Underwent DELANEY with cardioversion on  with conversion to NSR. Discharged home with metoprolol and eliquis. Also with bronchitis and acute conjunctivitis treated with amoxicillin and gentamicin eye drops. Was to follow up with EP Dr. Garcia on .   Presented to ED [facility] for tachycardia. States stopped drinking caffeine after discharge. Over past 2 days with 2 glasses of soda. Endorses compliance with home metoprolol  and eliquis. States was lying in bed when hear heartbeat against pillow. Home BP monitoring revealed 's. Denied CP, dyspnea, palpitations. On arrival to ED VS: 97.6-160-/94-94% room air. Was initiated on diltiazem infusion and medicated with  mg po.      Upon evaluation, awake, alert, resting in bed, wife at bedside. Current VS: 128-/74-95% room air. Bedside heart monitoring Afib. Denies CP, dyspnea, diaphoresis.   EKG reveals atrial tachycardia. Rate 147. . Qtc 446  CXR negative  Blood work reveals WBC 11.23, Hgb 13.2, Hct 41.7, sodium 135, troponin negative at 7, INR 1.2, PT 15.2    12/3/24-Patient without any further palpitations.  Ongoing IV diltiazem drip.  Cardiology with noted EP evaluation pending recommendations.  Denies any shortness of breath.  Denies any chest pain.  Had upper respiratory complaints of now resolved with initial A-fib in November.  Denies any other additional acute issues.   12/4/24 seen in bed NAD, Doing better will dc home, condition on dc stable.    DISCHARGE Follow Up Recommendations for labs and diagnostics: follow with pcp in one week  Follow with cardiology 2 weeks    START taking:  amiodarone (PACERONE)   nitroglycerin (NITROSTAT  Day of Discharge     Vital Signs:  Temp:  [97.3 °F (36.3 °C)-97.9 °F (36.6 °C)] 97.5 °F (36.4 °C)  Heart Rate:  [] 65  Resp:  [15-21] 21  BP: ()/(63-79) 104/79    Physical Exam   Vitals and nursing note reviewed.   HENT:      Head: Normocephalic.   Cardiovascular:      Rate and Rhythm: Normal rate. Rhythm irregular.   Pulmonary:      Effort: Pulmonary effort is normal.      Breath sounds: Normal breath sounds.   Abdominal:      Palpations: Abdomen is soft.   Musculoskeletal:         General: No swelling.   Neurological:      Mental Status: He is alert. Mental status is at baseline.     Pertinent  and/or Most Recent Results     LAB RESULTS:      Lab 12/03/24  0230   WBC 11.23*   HEMOGLOBIN 13.2   HEMATOCRIT 41.7    PLATELETS 374   NEUTROS ABS 7.77*   IMMATURE GRANS (ABS) 0.14*   LYMPHS ABS 2.02   MONOS ABS 1.01*   EOS ABS 0.23   MCV 91.6   PROTIME 15.2*         Lab 12/03/24  0230   SODIUM 135*   POTASSIUM 4.0   CHLORIDE 99   CO2 26.2   ANION GAP 9.8   BUN 20   CREATININE 1.01   EGFR 86.7   GLUCOSE 119*   CALCIUM 9.5   HEMOGLOBIN A1C 6.21*         Lab 12/03/24  0230   TOTAL PROTEIN 6.7   ALBUMIN 3.9   GLOBULIN 2.8   ALT (SGPT) 33   AST (SGOT) 20   BILIRUBIN 0.3   ALK PHOS 58         Lab 12/03/24  0430 12/03/24  0230   PROBNP 1,459.0*  --    HSTROP T 7 7   PROTIME  --  15.2*   INR  --  1.20*         Lab 12/03/24  0430   CHOLESTEROL 142   LDL CHOL 77   HDL CHOL 42   TRIGLYCERIDES 126             Brief Urine Lab Results       None          Microbiology Results (last 10 days)       ** No results found for the last 240 hours. **            XR Chest 1 View    Result Date: 12/3/2024  Impression: Impression: No acute cardiopulmonary abnormality. Electronically Signed: Kyle Segura MD  12/3/2024 3:38 AM EST  Workstation ID: [id number]    XR Chest 2 View    Result Date: 11/13/2024  Impression: Impression: No acute intrathoracic finding. Electronically Signed: Rohit Gomez  11/13/2024 6:29 PM EST  Workstation ID: [id number]             Results for orders placed during the hospital encounter of 11/15/24    Adult Transesophageal Echo (DELANEY) W/ Cont if Necessary Per Protocol (Cardiology Department)    Interpretation Summary    Left ventricular systolic function is normal. Left ventricular ejection fraction appears to be 56 - 60%.    Left ventricular wall thickness is consistent with mild concentric hypertrophy.    Left ventricular diastolic function was normal.    Saline test results are negative.      Labs Pending at Discharge:  Pending Results       None            Procedures Performed           Consults:   Consults       Date and Time Order Name Status Description    12/3/2024 11:28 AM Inpatient Cardiac Electrophysiology Consult  Completed     12/3/2024  4:37 AM Inpatient Cardiology Consult Completed     12/3/2024  3:50 AM Hospitalist (on-call MD unless specified)              Assessment and Plan           Atrial fibrillation with RVR  Hypertension  Atrial flutter     Atrial flutter  -flutter on tele  -wean cardizem drip, change to PO  -continue beta blocker  -serial troponin negative  -proBNP 1459  -on Eliquis  -discussed cardioversion and ablation; patient agreeable to both procedures     Hypertension  -continue beta blocker        No follow-ups on file.  Patient was given instructions and counseling regarding his condition or for health maintenance advice. Please see specific information pulled into the AVS if appropriate.      Alice Regalado, APRN  12/03/2024  03:04 PM EST        Addendum     Mr. Cruz was seen and examined today, I agree with the note outlined by nurse practitioner Alice Regalado and I would add the following.     Patient is a 59-year-old male with no apparent cardiac issues, presented to the hospital on November 15, 2024 with atrial flutter with rapid ventricular rates and cardioversion was performed on the 18th.  He was then discharged home on Toprol and Eliquis.     Patient presented back to the hospital with atrial flutter again with rapid ventricular rates, highly symptomatic.  He was started on IV Cardizem and likely self converted to sinus rhythm.     Patient will need to be scheduled for outpatient ablation.  He was seen by Dr. Garcia during previous admission and I will have his office schedule outpatient flutter ablation with them.  In the meantime in addition to the metoprolol and Eliquis we can start him on amiodarone to help maintain sinus rhythm until the procedure is done.  Patient is agreeable with the plan.        Electronically signed by Bj Lobato MD, 12/03/24, 5:26 PM EST.          Discharge Details        Discharge Medications        New Medications        Instructions Start Date    amiodarone 200 MG tablet  Commonly known as: PACERONE   200 mg, Oral, Every 12 Hours Scheduled      nitroglycerin 0.4 MG SL tablet  Commonly known as: NITROSTAT   0.4 mg, Sublingual, Every 5 Minutes PRN, Take no more than 3 doses in 15 minutes.             Continue These Medications        Instructions Start Date   acetaminophen 500 MG tablet  Commonly known as: TYLENOL   500 mg, Every 6 Hours PRN      albuterol sulfate  (90 Base) MCG/ACT inhaler  Commonly known as: PROVENTIL HFA;VENTOLIN HFA;PROAIR HFA   2 puffs, Every 4 Hours PRN      benzonatate 200 MG capsule  Commonly known as: TESSALON   200 mg, 3 Times Daily PRN      Eliquis 5 MG tablet tablet  Generic drug: apixaban   5 mg, Oral, Every 12 Hours Scheduled      metoprolol succinate XL 25 MG 24 hr tablet  Commonly known as: TOPROL-XL   25 mg, Oral, Every 24 Hours Scheduled               No Known Allergies      Discharge Disposition:   Home or Self Care    Diet:  Hospital:  Diet Order   Procedures    Diet: Cardiac; Healthy Heart (2-3 Na+); Fluid Consistency: Thin (IDDSI 0)         Discharge Activity:   Activity Instructions       Gradually Increase Activity Until at Pre-Hospitalization Level                CODE STATUS:  Code Status and Medical Interventions: CPR (Attempt to Resuscitate); Full Support   Ordered at: 12/03/24 0437     Level Of Support Discussed With:    Patient     Code Status (Patient has no pulse and is not breathing):    CPR (Attempt to Resuscitate)     Medical Interventions (Patient has pulse or is breathing):    Full Support         Future Appointments   Date Time Provider Department Center   12/12/2024  9:30 AM Angelito Garcia MD [facility]   2/17/2025  2:30 PM [facility]       Additional Instructions for the Follow-ups that You Need to Schedule       Discharge Follow-up with PCP   As directed       Currently Documented PCP:    Cherelle Bernabe APRN    PCP Phone Number:    [phone]      Follow Up Details: 1 week        Discharge Follow-up with Specified Provider: cardiology; 2 Weeks   As directed      To: cardiology   Follow Up: 2 Weeks                Time spent on Discharge including face to face service:  34 minutes    Signature: Electronically signed by Rashid Joyner MD, 12/04/24, 10:19 EST.  [facility] Hospitalist Team

## 2024-12-04 NOTE — PAYOR COMM NOTE
"This is a clinical update for Kenny Cruz   Reference/Auth # [id number]     PLEASE CANCEL INPATIENT AUTHORIZATION REQUEST, CASE HAS BEEN CHANGED TO OBSERVATION LEVEL OF CARE.     Thank you.    Omayra Redman, RN, BSN  Utilization Review Nurse  [facility]  Direct & confidential phone # [phone]  Fax # [phone]    12/04/24 0947  Initiate Observation Status  Once     Completed     Level of Care: Progressive Care  Diagnosis: Atrial fibrillation with RVR [278380]  Attending Physician: CARLEEN MCCALLUM [[id number]]          Kenny Cruz (59 y.o. Male)       Date of Birth   1965    Social Security Number       Address   [address] [place] [address] [place] [address]    Home Phone   [phone]    MRN   [id number]       Adventism   Restorationist    Marital Status                               Admission Date   12/3/24    Admission Type   Emergency    Admitting Provider   Brammell, Timothy Duane, MD    Attending Provider   Carleen Mccallum MD    Department, Room/Bed   [facility] 2D, 271/1       Discharge Date       Discharge Disposition       Discharge Destination                                 Attending Provider: Carleen Mccallum MD    Allergies: No Known Allergies    Isolation: None   Infection: None   Code Status: CPR    Ht: 182.9 cm (72\")   Wt: 114 kg (252 lb 3.3 oz)    Admission Cmt: None   Principal Problem: Atrial fibrillation with RVR [I48.91]                   Active Insurance as of 12/3/2024       Primary Coverage       Payor Plan Insurance Group Employer/Plan Group    [facility] [employer] [facility] BLUE CROSS BLUE [facility] [id number]       Payor Plan Address Payor Plan Phone Number Payor Plan Fax Number Effective Dates    [address] [phone]  1/1/2018 - None Entered    [facility] [id number]         Subscriber Name Subscriber Birth Date Member ID       KENNY CRUZ 1965 [id number]                     Emergency Contacts        (Rel.) Home " Phone Work Phone Mobile Phone    TAMRA MINAYA (Spouse) -- -- [phone]

## 2024-12-04 NOTE — PLAN OF CARE
Goal Outcome Evaluation:  Plan of Care Reviewed With: patient        Progress: improving          Pt resting comfortably with no complaints. Metoprolol held r/t hypotension and heart rate in the 50s. Will continue to monitor...

## 2024-12-05 ENCOUNTER — TRANSITIONAL CARE MANAGEMENT TELEPHONE ENCOUNTER (OUTPATIENT)
Dept: CALL CENTER | Facility: HOSPITAL | Age: 59
End: 2024-12-05
Payer: COMMERCIAL

## 2024-12-05 NOTE — OUTREACH NOTE
Call Center TCM Note      Flowsheet Row Responses   [facility] facility patient discharged from? [facility]   Does the patient have one of the following disease processes/diagnoses(primary or secondary)? Other   TCM attempt successful? No   Unsuccessful attempts Attempt 1   Call Status Voice mail issues  [mailbox is full]            Shonda Johnston RN    12/5/2024, 09:24 EST

## 2024-12-05 NOTE — OUTREACH NOTE
Call Center TCM Note      Flowsheet Row Responses   [facility] facility patient discharged from? [facility]   Does the patient have one of the following disease processes/diagnoses(primary or secondary)? Other   TCM attempt successful? No   Unsuccessful attempts Attempt 2            Shonda Johnston RN    12/5/2024, 13:13 EST

## 2024-12-06 ENCOUNTER — TRANSITIONAL CARE MANAGEMENT TELEPHONE ENCOUNTER (OUTPATIENT)
Dept: CALL CENTER | Facility: HOSPITAL | Age: 59
End: 2024-12-06
Payer: COMMERCIAL

## 2024-12-06 NOTE — OUTREACH NOTE
Call Center TCM Note      Flowsheet Row Responses   [facility] facility patient discharged from? [facility]   Does the patient have one of the following disease processes/diagnoses(primary or secondary)? Other   TCM attempt successful? No   Unsuccessful attempts Attempt 3            Gavino Roth RN    12/6/2024, 15:34 EST

## 2024-12-12 ENCOUNTER — OFFICE VISIT (OUTPATIENT)
Dept: CARDIOLOGY | Facility: CLINIC | Age: 59
End: 2024-12-12
Payer: COMMERCIAL

## 2024-12-12 VITALS
BODY MASS INDEX: 35.35 KG/M2 | DIASTOLIC BLOOD PRESSURE: 78 MMHG | HEART RATE: 66 BPM | HEIGHT: 72 IN | WEIGHT: 261 LBS | SYSTOLIC BLOOD PRESSURE: 120 MMHG | OXYGEN SATURATION: 99 %

## 2024-12-12 DIAGNOSIS — R00.2 PALPITATIONS: ICD-10-CM

## 2024-12-12 DIAGNOSIS — I10 PRIMARY HYPERTENSION: ICD-10-CM

## 2024-12-12 DIAGNOSIS — I48.92 ATRIAL FLUTTER, UNSPECIFIED TYPE: Primary | ICD-10-CM

## 2024-12-12 NOTE — H&P (VIEW-ONLY)
CC--- atrial flutter    Sub  59-year-old male patient presented with new onset atrial flutter the hospital and patient was seen in the hospital.  Patient had cardiac symptoms and rapid atrial flutter and underwent a DELANEY which showed normal EF further cardioversion to sinus rhythm and comes in for follow-up.  Patient had nocturnal bradycardia with medications and medications were titrated to avoid bradycardia.  Patient presented again with atrial flutter 2 weeks later and spontaneous coronary sinus rhythm and was left on amiodarone and comes in for follow-up.          Past Medical History:   Diagnosis Date    Abnormal ECG 11/15/24    Arrhythmia 11/15/24    Atrial fibrillation 11/15/24    Hypertension      No past surgical history on file.  Family History   Problem Relation Age of Onset    Valvular heart disease Mother     Other Mother         pacemaker    Coronary artery disease Father         CABG x 2     Diabetes Sister     Obesity Sister        Physical Exam    General:      well developed, well nourished, in no acute distress.    Head:      normocephalic and atraumatic.    Eyes:      PERRL/EOM intact, conjunctivae and sclerae clear without nystagmus.    Neck:      no  thyromegaly, trachea central with normal respiratory effort  Lungs:      clear bilaterally to auscultation.    Heart:       regular rate and rhythm, S1, S2 without murmurs, rubs, or gallops  Skin:      intact without lesions or rashes.    Psych:      alert and cooperative; normal mood and affect; normal attention span and concentration.          Assessment and plan    Recurrent symptomatic atrial flutter--- EKG consistent with classical atrial flutter and patient to be scheduled for EP study and ablation and discussed with the patient and amiodarone was stopped today  Schedule patient for EP study and ablation  Risks and benefits and outcomes of the procedure discussed with the patient  Amiodarone can be stopped  Likely anticoagulation can be  stopped few weeks after ablation  Normal TSH  Mild hypertension controlled with metoprolol        ECG 12 Lead    Date/Time: 12/12/2024 10:18 AM  Performed by: Angeltio Garcia MD    Authorized by: Angelito Garcia MD  Comparison: compared with previous ECG   Comparison to previous ECG: Sinus rhythm replaced atrial flutter      Electronically signed by Angelito Garcia MD, 12/12/24, 10:18 AM EST.

## 2024-12-12 NOTE — LETTER
December 12, 2024     UMESH Nichols  [address]  [address]  [place] [address]    Patient: Kenny Cruz   YOB: 1965   Date of Visit: 12/12/2024     Dear UMESH Nichols:       Thank you for referring Kenny Cruz to me for evaluation. Below are the relevant portions of my assessment and plan of care.    If you have questions, please do not hesitate to call me. I look forward to following Kenny along with you.         Sincerely,        Angelito Garcia MD        CC: No Recipients    Angelito Garcia MD  12/12/24 1030  Sign when Signing Visit  CC--- atrial flutter    Sub  59-year-old male patient presented with new onset atrial flutter the hospital and patient was seen in the hospital.  Patient had cardiac symptoms and rapid atrial flutter and underwent a DELANEY which showed normal EF further cardioversion to sinus rhythm and comes in for follow-up.  Patient had nocturnal bradycardia with medications and medications were titrated to avoid bradycardia.  Patient presented again with atrial flutter 2 weeks later and spontaneous coronary sinus rhythm and was left on amiodarone and comes in for follow-up.          Past Medical History:   Diagnosis Date   • Abnormal ECG 11/15/24   • Arrhythmia 11/15/24   • Atrial fibrillation 11/15/24   • Hypertension      No past surgical history on file.  Family History   Problem Relation Age of Onset   • Valvular heart disease Mother    • Other Mother         pacemaker   • Coronary artery disease Father         CABG x 2    • Diabetes Sister    • Obesity Sister        Physical Exam    General:      well developed, well nourished, in no acute distress.    Head:      normocephalic and atraumatic.    Eyes:      PERRL/EOM intact, conjunctivae and sclerae clear without nystagmus.    Neck:      no  thyromegaly, trachea central with normal respiratory effort  Lungs:      clear bilaterally to auscultation.    Heart:       regular rate and rhythm, S1, S2  without murmurs, rubs, or gallops  Skin:      intact without lesions or rashes.    Psych:      alert and cooperative; normal mood and affect; normal attention span and concentration.          Assessment and plan    Recurrent symptomatic atrial flutter--- EKG consistent with classical atrial flutter and patient to be scheduled for EP study and ablation and discussed with the patient and amiodarone was stopped today  Schedule patient for EP study and ablation  Risks and benefits and outcomes of the procedure discussed with the patient  Amiodarone can be stopped  Likely anticoagulation can be stopped few weeks after ablation  Normal TSH  Mild hypertension controlled with metoprolol        ECG 12 Lead    Date/Time: 12/12/2024 10:18 AM  Performed by: Angelito Garcia MD    Authorized by: Angelito Garcia MD  Comparison: compared with previous ECG   Comparison to previous ECG: Sinus rhythm replaced atrial flutter      Electronically signed by Angelito Garcia MD, 12/12/24, 10:18 AM EST.

## 2024-12-12 NOTE — PROGRESS NOTES
CC--- atrial flutter    Sub  59-year-old male patient presented with new onset atrial flutter the hospital and patient was seen in the hospital.  Patient had cardiac symptoms and rapid atrial flutter and underwent a DELANEY which showed normal EF further cardioversion to sinus rhythm and comes in for follow-up.  Patient had nocturnal bradycardia with medications and medications were titrated to avoid bradycardia.  Patient presented again with atrial flutter 2 weeks later and spontaneous coronary sinus rhythm and was left on amiodarone and comes in for follow-up.          Past Medical History:   Diagnosis Date    Abnormal ECG 11/15/24    Arrhythmia 11/15/24    Atrial fibrillation 11/15/24    Hypertension      No past surgical history on file.  Family History   Problem Relation Age of Onset    Valvular heart disease Mother     Other Mother         pacemaker    Coronary artery disease Father         CABG x 2     Diabetes Sister     Obesity Sister        Physical Exam    General:      well developed, well nourished, in no acute distress.    Head:      normocephalic and atraumatic.    Eyes:      PERRL/EOM intact, conjunctivae and sclerae clear without nystagmus.    Neck:      no  thyromegaly, trachea central with normal respiratory effort  Lungs:      clear bilaterally to auscultation.    Heart:       regular rate and rhythm, S1, S2 without murmurs, rubs, or gallops  Skin:      intact without lesions or rashes.    Psych:      alert and cooperative; normal mood and affect; normal attention span and concentration.          Assessment and plan    Recurrent symptomatic atrial flutter--- EKG consistent with classical atrial flutter and patient to be scheduled for EP study and ablation and discussed with the patient and amiodarone was stopped today  Schedule patient for EP study and ablation  Risks and benefits and outcomes of the procedure discussed with the patient  Amiodarone can be stopped  Likely anticoagulation can be  stopped few weeks after ablation  Normal TSH  Mild hypertension controlled with metoprolol        ECG 12 Lead    Date/Time: 12/12/2024 10:18 AM  Performed by: Angelito Garcia MD    Authorized by: Angelito Garcia MD  Comparison: compared with previous ECG   Comparison to previous ECG: Sinus rhythm replaced atrial flutter      Electronically signed by Angelito Garcia MD, 12/12/24, 10:18 AM EST.

## 2024-12-16 ENCOUNTER — LAB (OUTPATIENT)
Dept: LAB | Facility: HOSPITAL | Age: 59
End: 2024-12-16
Payer: COMMERCIAL

## 2024-12-16 ENCOUNTER — ANESTHESIA EVENT (OUTPATIENT)
Dept: CARDIOLOGY | Facility: HOSPITAL | Age: 59
End: 2024-12-16
Payer: COMMERCIAL

## 2024-12-16 DIAGNOSIS — D65 CONSUMPTIVE COAGULOPATHY: ICD-10-CM

## 2024-12-16 DIAGNOSIS — I48.92 ATRIAL FLUTTER, UNSPECIFIED TYPE: ICD-10-CM

## 2024-12-16 DIAGNOSIS — I48.91 ATRIAL FIBRILLATION WITH RVR: Primary | ICD-10-CM

## 2024-12-16 LAB
DEPRECATED RDW RBC AUTO: 45.2 FL (ref 37–54)
ERYTHROCYTE [DISTWIDTH] IN BLOOD BY AUTOMATED COUNT: 13.3 % (ref 12.3–15.4)
HCT VFR BLD AUTO: 39.5 % (ref 37.5–51)
HGB BLD-MCNC: 12.5 G/DL (ref 13–17.7)
INR PPP: 1.1 (ref 0.9–1.1)
MCH RBC QN AUTO: 29.5 PG (ref 26.6–33)
MCHC RBC AUTO-ENTMCNC: 31.6 G/DL (ref 31.5–35.7)
MCV RBC AUTO: 93.2 FL (ref 79–97)
PLATELET # BLD AUTO: 181 10*3/MM3 (ref 140–450)
PMV BLD AUTO: 10.9 FL (ref 6–12)
PROTHROMBIN TIME: 14.3 SECONDS (ref 11.7–14.2)
RBC # BLD AUTO: 4.24 10*6/MM3 (ref 4.14–5.8)
WBC NRBC COR # BLD AUTO: 5.86 10*3/MM3 (ref 3.4–10.8)

## 2024-12-16 PROCEDURE — 36415 COLL VENOUS BLD VENIPUNCTURE: CPT

## 2024-12-16 PROCEDURE — 85027 COMPLETE CBC AUTOMATED: CPT

## 2024-12-16 PROCEDURE — 85610 PROTHROMBIN TIME: CPT

## 2024-12-17 ENCOUNTER — ANESTHESIA (OUTPATIENT)
Dept: CARDIOLOGY | Facility: HOSPITAL | Age: 59
End: 2024-12-17
Payer: COMMERCIAL

## 2024-12-17 ENCOUNTER — HOSPITAL ENCOUNTER (OUTPATIENT)
Facility: HOSPITAL | Age: 59
Setting detail: HOSPITAL OUTPATIENT SURGERY
Discharge: HOME | End: 2024-12-17
Attending: INTERNAL MEDICINE | Admitting: INTERNAL MEDICINE
Payer: COMMERCIAL

## 2024-12-17 VITALS
SYSTOLIC BLOOD PRESSURE: 123 MMHG | HEART RATE: 62 BPM | OXYGEN SATURATION: 94 % | DIASTOLIC BLOOD PRESSURE: 76 MMHG | HEIGHT: 73 IN | TEMPERATURE: 97.8 F | RESPIRATION RATE: 16 BRPM | WEIGHT: 263.23 LBS | BODY MASS INDEX: 34.89 KG/M2

## 2024-12-17 DIAGNOSIS — I48.92 ATRIAL FLUTTER, UNSPECIFIED TYPE: ICD-10-CM

## 2024-12-17 PROCEDURE — 25010000002 HEPARIN (PORCINE) PER 1000 UNITS: Performed by: NURSE ANESTHETIST, CERTIFIED REGISTERED

## 2024-12-17 PROCEDURE — 25010000002 PROPOFOL 1000 MG/100ML EMULSION: Performed by: ANESTHESIOLOGY

## 2024-12-17 PROCEDURE — 93656 COMPRE EP EVAL ABLTJ ATR FIB: CPT | Performed by: INTERNAL MEDICINE

## 2024-12-17 PROCEDURE — C1894 INTRO/SHEATH, NON-LASER: HCPCS

## 2024-12-17 PROCEDURE — C1893 INTRO/SHEATH, FIXED,NON-PEEL: HCPCS | Performed by: INTERNAL MEDICINE

## 2024-12-17 PROCEDURE — C1894 INTRO/SHEATH, NON-LASER: HCPCS | Performed by: INTERNAL MEDICINE

## 2024-12-17 PROCEDURE — C1732 CATH, EP, DIAG/ABL, 3D/VECT: HCPCS | Performed by: INTERNAL MEDICINE

## 2024-12-17 PROCEDURE — 25810000003 SODIUM CHLORIDE 0.9 % SOLUTION 250 ML FLEX CONT: Performed by: NURSE ANESTHETIST, CERTIFIED REGISTERED

## 2024-12-17 PROCEDURE — C1766 INTRO/SHEATH,STRBLE,NON-PEEL: HCPCS | Performed by: INTERNAL MEDICINE

## 2024-12-17 PROCEDURE — C1730 CATH, EP, 19 OR FEW ELECT: HCPCS | Performed by: INTERNAL MEDICINE

## 2024-12-17 PROCEDURE — 25810000003 SODIUM CHLORIDE 0.9 % SOLUTION: Performed by: INTERNAL MEDICINE

## 2024-12-17 PROCEDURE — 25010000002 SUGAMMADEX 200 MG/2ML SOLUTION: Performed by: NURSE ANESTHETIST, CERTIFIED REGISTERED

## 2024-12-17 PROCEDURE — 25010000002 GLYCOPYRROLATE 0.2 MG/ML SOLUTION: Performed by: NURSE ANESTHETIST, CERTIFIED REGISTERED

## 2024-12-17 PROCEDURE — C1769 GUIDE WIRE: HCPCS | Performed by: INTERNAL MEDICINE

## 2024-12-17 PROCEDURE — 25010000002 PHENYLEPHRINE 10 MG/ML SOLUTION 5 ML VIAL: Performed by: NURSE ANESTHETIST, CERTIFIED REGISTERED

## 2024-12-17 PROCEDURE — 25010000002 PROTAMINE SULFATE PER 10 MG: Performed by: NURSE ANESTHETIST, CERTIFIED REGISTERED

## 2024-12-17 PROCEDURE — 85347 COAGULATION TIME ACTIVATED: CPT

## 2024-12-17 PROCEDURE — 25010000002 LIDOCAINE 1 % SOLUTION: Performed by: INTERNAL MEDICINE

## 2024-12-17 PROCEDURE — 25010000002 LIDOCAINE PF 2% 2 % SOLUTION: Performed by: ANESTHESIOLOGY

## 2024-12-17 PROCEDURE — C1733 CATH, EP, OTHR THAN COOL-TIP: HCPCS | Performed by: INTERNAL MEDICINE

## 2024-12-17 PROCEDURE — 25010000002 FENTANYL CITRATE (PF) 100 MCG/2ML SOLUTION: Performed by: NURSE ANESTHETIST, CERTIFIED REGISTERED

## 2024-12-17 PROCEDURE — 25810000003 SODIUM CHLORIDE 0.9 % SOLUTION: Performed by: NURSE ANESTHETIST, CERTIFIED REGISTERED

## 2024-12-17 PROCEDURE — 93655 ICAR CATH ABLTJ DSCRT ARRHYT: CPT | Performed by: INTERNAL MEDICINE

## 2024-12-17 PROCEDURE — C1759 CATH, INTRA ECHOCARDIOGRAPHY: HCPCS | Performed by: INTERNAL MEDICINE

## 2024-12-17 RX ORDER — SODIUM CHLORIDE 9 MG/ML
INJECTION, SOLUTION INTRAVENOUS CONTINUOUS PRN
Status: DISCONTINUED | OUTPATIENT
Start: 2024-12-17 | End: 2024-12-17 | Stop reason: SURG

## 2024-12-17 RX ORDER — PROTAMINE SULFATE 10 MG/ML
INJECTION, SOLUTION INTRAVENOUS AS NEEDED
Status: DISCONTINUED | OUTPATIENT
Start: 2024-12-17 | End: 2024-12-17 | Stop reason: SURG

## 2024-12-17 RX ORDER — FENTANYL CITRATE 50 UG/ML
INJECTION, SOLUTION INTRAMUSCULAR; INTRAVENOUS AS NEEDED
Status: DISCONTINUED | OUTPATIENT
Start: 2024-12-17 | End: 2024-12-17 | Stop reason: SURG

## 2024-12-17 RX ORDER — ONDANSETRON 2 MG/ML
4 INJECTION INTRAMUSCULAR; INTRAVENOUS ONCE AS NEEDED
Status: DISCONTINUED | OUTPATIENT
Start: 2024-12-17 | End: 2024-12-17 | Stop reason: HOSPADM

## 2024-12-17 RX ORDER — GLYCOPYRROLATE 0.2 MG/ML
INJECTION INTRAMUSCULAR; INTRAVENOUS AS NEEDED
Status: DISCONTINUED | OUTPATIENT
Start: 2024-12-17 | End: 2024-12-17 | Stop reason: SURG

## 2024-12-17 RX ORDER — NALOXONE HCL 0.4 MG/ML
0.4 VIAL (ML) INJECTION
Status: DISCONTINUED | OUTPATIENT
Start: 2024-12-17 | End: 2024-12-17 | Stop reason: HOSPADM

## 2024-12-17 RX ORDER — SODIUM CHLORIDE 9 MG/ML
30 INJECTION, SOLUTION INTRAVENOUS CONTINUOUS
Status: DISCONTINUED | OUTPATIENT
Start: 2024-12-17 | End: 2024-12-17 | Stop reason: HOSPADM

## 2024-12-17 RX ORDER — ACETAMINOPHEN 650 MG/1
650 SUPPOSITORY RECTAL EVERY 4 HOURS PRN
Status: DISCONTINUED | OUTPATIENT
Start: 2024-12-17 | End: 2024-12-17 | Stop reason: HOSPADM

## 2024-12-17 RX ORDER — ONDANSETRON 2 MG/ML
4 INJECTION INTRAMUSCULAR; INTRAVENOUS EVERY 6 HOURS PRN
Status: DISCONTINUED | OUTPATIENT
Start: 2024-12-17 | End: 2024-12-17 | Stop reason: HOSPADM

## 2024-12-17 RX ORDER — PHENYLEPHRINE HCL IN 0.9% NACL 1 MG/10 ML
SYRINGE (ML) INTRAVENOUS AS NEEDED
Status: DISCONTINUED | OUTPATIENT
Start: 2024-12-17 | End: 2024-12-17 | Stop reason: SURG

## 2024-12-17 RX ORDER — ROCURONIUM BROMIDE 10 MG/ML
INJECTION, SOLUTION INTRAVENOUS AS NEEDED
Status: DISCONTINUED | OUTPATIENT
Start: 2024-12-17 | End: 2024-12-17 | Stop reason: SURG

## 2024-12-17 RX ORDER — PROPOFOL 10 MG/ML
INJECTION, EMULSION INTRAVENOUS CONTINUOUS PRN
Status: DISCONTINUED | OUTPATIENT
Start: 2024-12-17 | End: 2024-12-17 | Stop reason: SURG

## 2024-12-17 RX ORDER — ACETAMINOPHEN 325 MG/1
650 TABLET ORAL EVERY 4 HOURS PRN
Status: DISCONTINUED | OUTPATIENT
Start: 2024-12-17 | End: 2024-12-17 | Stop reason: HOSPADM

## 2024-12-17 RX ORDER — SODIUM CHLORIDE 0.9 % (FLUSH) 0.9 %
10 SYRINGE (ML) INJECTION EVERY 12 HOURS SCHEDULED
Status: DISCONTINUED | OUTPATIENT
Start: 2024-12-17 | End: 2024-12-17 | Stop reason: HOSPADM

## 2024-12-17 RX ORDER — OXYCODONE HYDROCHLORIDE 5 MG/1
10 TABLET ORAL EVERY 4 HOURS PRN
Status: DISCONTINUED | OUTPATIENT
Start: 2024-12-17 | End: 2024-12-17 | Stop reason: HOSPADM

## 2024-12-17 RX ORDER — MORPHINE SULFATE 2 MG/ML
1 INJECTION, SOLUTION INTRAMUSCULAR; INTRAVENOUS EVERY 4 HOURS PRN
Status: DISCONTINUED | OUTPATIENT
Start: 2024-12-17 | End: 2024-12-17 | Stop reason: HOSPADM

## 2024-12-17 RX ORDER — HYDROCODONE BITARTRATE AND ACETAMINOPHEN 5; 325 MG/1; MG/1
1 TABLET ORAL EVERY 4 HOURS PRN
Status: DISCONTINUED | OUTPATIENT
Start: 2024-12-17 | End: 2024-12-17 | Stop reason: HOSPADM

## 2024-12-17 RX ORDER — HYDROMORPHONE HYDROCHLORIDE 1 MG/ML
0.5 INJECTION, SOLUTION INTRAMUSCULAR; INTRAVENOUS; SUBCUTANEOUS
Status: DISCONTINUED | OUTPATIENT
Start: 2024-12-17 | End: 2024-12-17 | Stop reason: HOSPADM

## 2024-12-17 RX ORDER — HEPARIN SODIUM 1000 [USP'U]/ML
INJECTION, SOLUTION INTRAVENOUS; SUBCUTANEOUS AS NEEDED
Status: DISCONTINUED | OUTPATIENT
Start: 2024-12-17 | End: 2024-12-17 | Stop reason: SURG

## 2024-12-17 RX ORDER — FENTANYL CITRATE 50 UG/ML
50 INJECTION, SOLUTION INTRAMUSCULAR; INTRAVENOUS
Status: DISCONTINUED | OUTPATIENT
Start: 2024-12-17 | End: 2024-12-17 | Stop reason: HOSPADM

## 2024-12-17 RX ORDER — OXYCODONE HYDROCHLORIDE 5 MG/1
5 TABLET ORAL ONCE AS NEEDED
Status: DISCONTINUED | OUTPATIENT
Start: 2024-12-17 | End: 2024-12-17 | Stop reason: HOSPADM

## 2024-12-17 RX ORDER — SODIUM CHLORIDE 0.9 % (FLUSH) 0.9 %
10 SYRINGE (ML) INJECTION AS NEEDED
Status: DISCONTINUED | OUTPATIENT
Start: 2024-12-17 | End: 2024-12-17 | Stop reason: HOSPADM

## 2024-12-17 RX ORDER — ACETAMINOPHEN 325 MG/1
650 TABLET ORAL ONCE AS NEEDED
Status: DISCONTINUED | OUTPATIENT
Start: 2024-12-17 | End: 2024-12-17 | Stop reason: HOSPADM

## 2024-12-17 RX ORDER — LIDOCAINE HYDROCHLORIDE 10 MG/ML
INJECTION, SOLUTION INFILTRATION; PERINEURAL
Status: DISCONTINUED | OUTPATIENT
Start: 2024-12-17 | End: 2024-12-17 | Stop reason: HOSPADM

## 2024-12-17 RX ADMIN — SODIUM CHLORIDE 30 ML/HR: 9 INJECTION, SOLUTION INTRAVENOUS at 10:32

## 2024-12-17 RX ADMIN — HEPARIN SODIUM 12000 UNITS: 1000 INJECTION INTRAVENOUS; SUBCUTANEOUS at 11:54

## 2024-12-17 RX ADMIN — Medication 10 ML: at 10:32

## 2024-12-17 RX ADMIN — Medication 200 MCG: at 12:04

## 2024-12-17 RX ADMIN — PROTAMINE SULFATE 15 MG: 10 INJECTION, SOLUTION INTRAVENOUS at 13:07

## 2024-12-17 RX ADMIN — ROCURONIUM BROMIDE 50 MG: 10 INJECTION, SOLUTION INTRAVENOUS at 12:06

## 2024-12-17 RX ADMIN — HEPARIN SODIUM 2000 UNITS: 1000 INJECTION INTRAVENOUS; SUBCUTANEOUS at 11:29

## 2024-12-17 RX ADMIN — PROPOFOL INJECTABLE EMULSION 150 MG: 10 INJECTION, EMULSION INTRAVENOUS at 12:05

## 2024-12-17 RX ADMIN — PROPOFOL INJECTABLE EMULSION 300 MCG/KG/MIN: 10 INJECTION, EMULSION INTRAVENOUS at 10:52

## 2024-12-17 RX ADMIN — LIDOCAINE HYDROCHLORIDE 100 MG: 20 INJECTION, SOLUTION EPIDURAL; INFILTRATION; INTRACAUDAL; PERINEURAL at 10:51

## 2024-12-17 RX ADMIN — PROTAMINE SULFATE 5 MG: 10 INJECTION, SOLUTION INTRAVENOUS at 13:04

## 2024-12-17 RX ADMIN — PROTAMINE SULFATE 30 MG: 10 INJECTION, SOLUTION INTRAVENOUS at 13:13

## 2024-12-17 RX ADMIN — FENTANYL CITRATE 100 MCG: 50 INJECTION, SOLUTION INTRAMUSCULAR; INTRAVENOUS at 11:29

## 2024-12-17 RX ADMIN — PHENYLEPHRINE HYDROCHLORIDE 0.2 MCG/KG/MIN: 10 INJECTION INTRAVENOUS at 11:30

## 2024-12-17 RX ADMIN — SUGAMMADEX 200 MG: 100 INJECTION, SOLUTION INTRAVENOUS at 13:14

## 2024-12-17 RX ADMIN — SODIUM CHLORIDE: 9 INJECTION, SOLUTION INTRAVENOUS at 10:46

## 2024-12-17 RX ADMIN — ROCURONIUM BROMIDE 20 MG: 10 INJECTION, SOLUTION INTRAVENOUS at 13:02

## 2024-12-17 RX ADMIN — GLYCOPYRROLATE 0.2 MG: 0.2 INJECTION, SOLUTION INTRAMUSCULAR; INTRAVENOUS at 11:56

## 2024-12-17 RX ADMIN — SODIUM CHLORIDE: 9 INJECTION, SOLUTION INTRAVENOUS at 12:03

## 2024-12-17 RX ADMIN — Medication 200 MCG: at 12:11

## 2024-12-17 RX ADMIN — PROPOFOL INJECTABLE EMULSION 80 MG: 10 INJECTION, EMULSION INTRAVENOUS at 10:51

## 2024-12-17 NOTE — Clinical Note
A 11 fr sheath was successfully inserted using micropuncture technique into the right femoral vein.
A 9 fr sheath was successfully inserted using micropuncture technique into the right femoral vein.
ACT = 147 (sec).
ACT = 285 (sec).
ACT = 302 (sec).
All Patches/Pads removed. Skin Intact.
Allergies reviewed.  H&P note has been confirmed for the patient. Procedural consent has been signed.  Staff has reviewed the patient's labs.
An EP study / Ablation is in progress
An EP study / Ablation is in progress  Multiple PFA lesions applied to the pulmonary veins.
An EP study / Ablation is in progress  Pacing started
An EP study / Ablation is in progress  RF ablation started.
Astrid thao removed 
Bilateral wrist restraints applied for patient safety during procedure. 
Bilateral wrist restraints removed, skin intact.
EP Catheter inserted.
EP Catheter removed. 
Faradrive sheath removed.
Hemostasis started on the right femoral vein. Figure 8 suturing was used in achieving hemostasis. Closure device deployed in the vessel. Hemostasis achieved successfully. Closure device additional comment: Sheaths removed by Dr Garcia using suture and stopcock
Inserted under fluoro.
Mapping patches applied.
No in lab complications
Number of grounding pads used: 1  Location of grounding pads: left thigh
PFA started.
Pacing and ablation complete
Physician notified by staff.
Preface removed.
Prepped: groin. Prepped with: ChloraPrep. The patient was draped in a sterile fashion.
Replaced previous sheath in the right femoral vein.
Report was  verbally given at bedside. .
Sheath inserted into accessed vessel via 18 fr sheath
Sheath inserted into accessed vessel via faradrive
The DP pulses are +2 bilaterally. The PT pulses are +2 bilaterally.
Trans-septal procedure in progress 
pt premature 27 weeks, no medical hx. mother reports fever and productive cough since Thursday. denies n/v/d. denies rash or sick contacts. decreased po intake, normal wet diapers. pt laughing/well appearing.

## 2024-12-17 NOTE — ANESTHESIA PREPROCEDURE EVALUATION
Anesthesia Evaluation     Patient summary reviewed   no history of anesthetic complications:   NPO Solid Status: > 8 hours  NPO Liquid Status: > 8 hours           Airway   Mallampati: II  TM distance: >3 FB  Neck ROM: full  Dental - normal exam     Pulmonary - normal exam   (+) ,sleep apnea  (-) not a smoker  Cardiovascular - normal exam    ECG reviewed    (+) hypertension, dysrhythmias Atrial Fib      Neuro/Psych- negative ROS  GI/Hepatic/Renal/Endo    (+) obesity    Musculoskeletal     Abdominal    Substance History   (-) alcohol use     OB/GYN          Other                      Anesthesia Plan    ASA 3     general   total IV anesthesia  intravenous induction     Anesthetic plan, risks, benefits, and alternatives have been provided, discussed and informed consent has been obtained with: patient.    Plan discussed with CRNA.    CODE STATUS:

## 2024-12-18 LAB
ACT BLD: 147 SECONDS (ref 89–137)
ACT BLD: 285 SECONDS (ref 89–137)
ACT BLD: 302 SECONDS (ref 89–137)

## 2024-12-18 NOTE — ANESTHESIA POSTPROCEDURE EVALUATION
Patient: Kenny Cruz    Procedure Summary       Date: 12/17/24 Room / Location: [facility] CATH LAB 3 / [facility] CATH INVASIVE LOCATION    Anesthesia Start: 1046 Anesthesia Stop: 1326    Procedure: Ablation atrial flutter Diagnosis:       Atrial flutter, unspecified type      (Paroxysmal atrial flutter with rapid ventricular response)    Providers: Angelito Garcia MD Provider: Carol Ann Ng MD    Anesthesia Type: general ASA Status: 3            Anesthesia Type: general    Vitals  Vitals Value Taken Time   /92 12/17/24 1425   Temp 97.8 °F (36.6 °C) 12/17/24 1425   Pulse 64 12/17/24 1425   Resp 16 12/17/24 1425   SpO2 95 % 12/17/24 1425           Post Anesthesia Care and Evaluation    Patient location during evaluation: PACU  Patient participation: complete - patient participated  Level of consciousness: awake  Pain score: 0  Pain management: adequate  Anesthetic complications: No anesthetic complications  PONV Status: none  Cardiovascular status: acceptable  Respiratory status: acceptable  Hydration status: acceptable

## 2024-12-26 ENCOUNTER — TELEPHONE (OUTPATIENT)
Dept: CARDIOLOGY | Facility: CLINIC | Age: 59
End: 2024-12-26

## 2024-12-26 NOTE — TELEPHONE ENCOUNTER
Caller: Kenny Cruz    Relationship: Self    Best call back number: [phone]      PATIENT IS NEEDING TO RESCHEDULE HIS 1.2.25 APPT FOR A  MONDAY APPT     OR 3:30 PM ON TUESDAY, WED, OR THURSDAY

## 2025-01-02 ENCOUNTER — OFFICE VISIT (OUTPATIENT)
Dept: CARDIOLOGY | Facility: CLINIC | Age: 60
End: 2025-01-02
Payer: COMMERCIAL

## 2025-01-02 VITALS
HEART RATE: 62 BPM | DIASTOLIC BLOOD PRESSURE: 84 MMHG | SYSTOLIC BLOOD PRESSURE: 128 MMHG | BODY MASS INDEX: 35.7 KG/M2 | OXYGEN SATURATION: 97 % | HEIGHT: 72 IN

## 2025-01-02 DIAGNOSIS — I48.91 ATRIAL FIBRILLATION WITH RVR: Primary | ICD-10-CM

## 2025-01-02 DIAGNOSIS — I10 PRIMARY HYPERTENSION: ICD-10-CM

## 2025-01-02 DIAGNOSIS — D65 CONSUMPTIVE COAGULOPATHY: ICD-10-CM

## 2025-01-02 DIAGNOSIS — I48.92 ATRIAL FLUTTER, UNSPECIFIED TYPE: ICD-10-CM

## 2025-01-02 DIAGNOSIS — R00.2 PALPITATIONS: ICD-10-CM

## 2025-01-02 PROCEDURE — 93000 ELECTROCARDIOGRAM COMPLETE: CPT | Performed by: INTERNAL MEDICINE

## 2025-01-02 PROCEDURE — 99214 OFFICE O/P EST MOD 30 MIN: CPT | Performed by: INTERNAL MEDICINE

## 2025-01-02 RX ORDER — METOPROLOL SUCCINATE 25 MG/1
25 TABLET, EXTENDED RELEASE ORAL
Qty: 90 TABLET | Refills: 0 | Status: SHIPPED | OUTPATIENT
Start: 2025-01-02 | End: 2025-07-01

## 2025-01-02 NOTE — PROGRESS NOTES
CC--- atrial flutter    Sub  59-year-old male patient had incessant atrial flutter and mapping was performed with ablation of atrial flutter with initiation of incessant atrial fibrillation needing pulmonary vein isolation done in December 2024 and comes in for follow-up.  Patient doing remarkably well post ablation without any further arrhythmia  Patient has normal EF and history of nocturnal bradycardia in the past.  Patient was on amiodarone in the past.            Past Medical History:   Diagnosis Date    Abnormal ECG 11/15/24    Arrhythmia 11/15/24    Atrial fibrillation 11/15/24    Hypertension      No past surgical history on file.  Family History   Problem Relation Age of Onset    Valvular heart disease Mother     Other Mother         pacemaker    Coronary artery disease Father         CABG x 2     Diabetes Sister     Obesity Sister          Physical Exam    General:      well developed, well nourished, in no acute distress.    Head:      normocephalic and atraumatic.    Eyes:      PERRL/EOM intact, conjunctivae and sclerae clear without nystagmus.    Neck:      no  thyromegaly, trachea central with normal respiratory effort  Lungs:      clear bilaterally to auscultation.    Heart:       regular rate and rhythm, S1, S2 without murmurs, rubs, or gallops  Skin:      intact without lesions or rashes.    Psych:      alert and cooperative; normal mood and affect; normal attention span and concentration.          Assessment and plan  Recurrent atrial flutter with induction of atrial fibrillation and left atrial flutter post ablation in December 2024 doing well in sinus rhythm--- refill for Eliquis given for 1 month and after that changed to baby aspirin which was discussed with the patient  Refill for metoprolol given  Mild hypertension on metoprolol  Normal TSH  Medications reviewed and follow-up appointments made        ECG 12 Lead    Date/Time: 1/2/2025 11:41 AM  Performed by: Angelito Garcia  MD    Authorized by: Angelito Garcia MD  Comparison: compared with previous ECG   Similar to previous ECG  Rhythm: sinus rhythm  Rate: normal  Conduction: conduction normal        Electronically signed by Angelito Garcia MD, 01/02/25, 11:41 AM EST.

## 2025-01-02 NOTE — LETTER
January 2, 2025     UMESH Nichols  [address]  [address]  [place] [address]    Patient: Kenny Cruz   YOB: 1965   Date of Visit: 1/2/2025     Dear UMESH Nichols:       Thank you for referring Kenny Cruz to me for evaluation. Below are the relevant portions of my assessment and plan of care.    If you have questions, please do not hesitate to call me. I look forward to following Kenny along with you.         Sincerely,        Angelito Garcia MD        CC: No Recipients    Angelito Garcia MD  01/02/25 1143  Sign when Signing Visit  CC--- atrial flutter    Sub  59-year-old male patient had incessant atrial flutter and mapping was performed with ablation of atrial flutter with initiation of incessant atrial fibrillation needing pulmonary vein isolation done in December 2024 and comes in for follow-up.  Patient doing remarkably well post ablation without any further arrhythmia  Patient has normal EF and history of nocturnal bradycardia in the past.  Patient was on amiodarone in the past.            Past Medical History:   Diagnosis Date   • Abnormal ECG 11/15/24   • Arrhythmia 11/15/24   • Atrial fibrillation 11/15/24   • Hypertension      No past surgical history on file.  Family History   Problem Relation Age of Onset   • Valvular heart disease Mother    • Other Mother         pacemaker   • Coronary artery disease Father         CABG x 2    • Diabetes Sister    • Obesity Sister          Physical Exam    General:      well developed, well nourished, in no acute distress.    Head:      normocephalic and atraumatic.    Eyes:      PERRL/EOM intact, conjunctivae and sclerae clear without nystagmus.    Neck:      no  thyromegaly, trachea central with normal respiratory effort  Lungs:      clear bilaterally to auscultation.    Heart:       regular rate and rhythm, S1, S2 without murmurs, rubs, or gallops  Skin:      intact without lesions or rashes.    Psych:      alert  and cooperative; normal mood and affect; normal attention span and concentration.          Assessment and plan  Recurrent atrial flutter with induction of atrial fibrillation and left atrial flutter post ablation in December 2024 doing well in sinus rhythm--- refill for Eliquis given for 1 month and after that changed to baby aspirin which was discussed with the patient  Refill for metoprolol given  Mild hypertension on metoprolol  Normal TSH  Medications reviewed and follow-up appointments made        ECG 12 Lead    Date/Time: 1/2/2025 11:41 AM  Performed by: Angelito Garcia MD    Authorized by: Angelito Garcia MD  Comparison: compared with previous ECG   Similar to previous ECG  Rhythm: sinus rhythm  Rate: normal  Conduction: conduction normal        Electronically signed by Angelito Garcia MD, 01/02/25, 11:41 AM EST.

## 2025-02-17 ENCOUNTER — LAB (OUTPATIENT)
Dept: FAMILY MEDICINE CLINIC | Facility: CLINIC | Age: 60
End: 2025-02-17
Payer: COMMERCIAL

## 2025-02-17 ENCOUNTER — OFFICE VISIT (OUTPATIENT)
Dept: FAMILY MEDICINE CLINIC | Facility: CLINIC | Age: 60
End: 2025-02-17
Payer: COMMERCIAL

## 2025-02-17 VITALS
DIASTOLIC BLOOD PRESSURE: 72 MMHG | HEIGHT: 72 IN | OXYGEN SATURATION: 98 % | WEIGHT: 270.6 LBS | HEART RATE: 64 BPM | SYSTOLIC BLOOD PRESSURE: 124 MMHG | RESPIRATION RATE: 18 BRPM | BODY MASS INDEX: 36.65 KG/M2

## 2025-02-17 DIAGNOSIS — Z11.59 NEED FOR HEPATITIS C SCREENING TEST: ICD-10-CM

## 2025-02-17 DIAGNOSIS — Z12.11 SCREENING FOR COLON CANCER: ICD-10-CM

## 2025-02-17 DIAGNOSIS — Z00.00 PREVENTATIVE HEALTH CARE: ICD-10-CM

## 2025-02-17 DIAGNOSIS — Z12.5 SCREENING FOR PROSTATE CANCER: ICD-10-CM

## 2025-02-17 DIAGNOSIS — R73.03 PREDIABETES: ICD-10-CM

## 2025-02-17 DIAGNOSIS — Z23 IMMUNIZATION DUE: ICD-10-CM

## 2025-02-17 DIAGNOSIS — E66.9 OBESITY (BMI 30-39.9): ICD-10-CM

## 2025-02-17 DIAGNOSIS — Z00.00 PREVENTATIVE HEALTH CARE: Primary | ICD-10-CM

## 2025-02-17 LAB — HBA1C MFR BLD: 5.8 % (ref 4.8–5.6)

## 2025-02-17 PROCEDURE — 90472 IMMUNIZATION ADMIN EACH ADD: CPT | Performed by: NURSE PRACTITIONER

## 2025-02-17 PROCEDURE — G0103 PSA SCREENING: HCPCS | Performed by: NURSE PRACTITIONER

## 2025-02-17 PROCEDURE — 90715 TDAP VACCINE 7 YRS/> IM: CPT | Performed by: NURSE PRACTITIONER

## 2025-02-17 PROCEDURE — 86803 HEPATITIS C AB TEST: CPT | Performed by: NURSE PRACTITIONER

## 2025-02-17 PROCEDURE — 83036 HEMOGLOBIN GLYCOSYLATED A1C: CPT | Performed by: NURSE PRACTITIONER

## 2025-02-17 PROCEDURE — 90471 IMMUNIZATION ADMIN: CPT | Performed by: NURSE PRACTITIONER

## 2025-02-17 PROCEDURE — 90750 HZV VACC RECOMBINANT IM: CPT | Performed by: NURSE PRACTITIONER

## 2025-02-17 PROCEDURE — 99396 PREV VISIT EST AGE 40-64: CPT | Performed by: NURSE PRACTITIONER

## 2025-02-17 PROCEDURE — 36415 COLL VENOUS BLD VENIPUNCTURE: CPT

## 2025-02-17 PROCEDURE — 80050 GENERAL HEALTH PANEL: CPT | Performed by: NURSE PRACTITIONER

## 2025-02-17 RX ORDER — ASPIRIN 81 MG/1
81 TABLET, CHEWABLE ORAL DAILY
COMMUNITY

## 2025-02-17 NOTE — PROGRESS NOTES
"Chief Complaint  Annual Exam  Subjective        Kenny Cruz presents to [facility]  History of Present Illness  Pt comes in today for routine physical.   Was last seen in Nov for TCM after diagnosis of Afib/flutter.   He has since had ablation done  Seeing Dr. Garcia.  Had labs done in hospital and A1C was slightly elevated at 6.2         Objective     Vital Signs:   /72   Pulse 64   Resp 18   Ht 182.9 cm (72.01\")   Wt 123 kg (270 lb 9.6 oz)   SpO2 98%   BMI 36.69 kg/m²       BP Readings from Last 3 Encounters:   02/17/25 124/72   01/02/25 128/84   12/17/24 123/76       Wt Readings from Last 3 Encounters:   02/17/25 123 kg (270 lb 9.6 oz)   12/17/24 119 kg (263 lb 3.7 oz)   12/12/24 118 kg (261 lb)     Physical Exam  Constitutional:       Appearance: He is well-developed. He is obese.   HENT:      Head: Normocephalic.   Eyes:      Conjunctiva/sclera: Conjunctivae normal.      Pupils: Pupils are equal, round, and reactive to light.   Neck:      Thyroid: No thyromegaly.   Cardiovascular:      Rate and Rhythm: Normal rate and regular rhythm.      Heart sounds: No murmur heard.  Pulmonary:      Effort: Pulmonary effort is normal.      Breath sounds: Normal breath sounds.   Abdominal:      General: Bowel sounds are normal.      Palpations: Abdomen is soft.      Tenderness: There is no abdominal tenderness.   Musculoskeletal:         General: Normal range of motion.      Cervical back: Normal range of motion and neck supple.   Skin:     General: Skin is warm and dry.      Findings: No lesion.   Neurological:      Mental Status: He is alert and oriented to person, place, and time.   Psychiatric:         Behavior: Behavior normal.        Result Review :                 Assessment and Plan    Diagnoses and all orders for this visit:    1. Preventative health care (Primary)  -     Comprehensive Metabolic Panel; Future  -     Hemoglobin A1c; Future  -     TSH; Future  -     PSA " Screen; Future  -     CBC & Differential; Future    2. Screening for colon cancer  -     Cologuard - Stool, Per Rectum; Future    3. Screening for prostate cancer  -     PSA Screen; Future    4. Need for hepatitis C screening test  -     Hepatitis C Antibody; Future    5. Immunization due  -     Tdap Vaccine Greater Than or Equal To 6yo IM  -     Shingrix Vaccine    6. Prediabetes  Assessment & Plan:  Will recheck labs today. Discussed lifestyle changes with pt.       7. Obesity (BMI 30-39.9)  Assessment & Plan:  Patient's (Body mass index is 36.69 kg/m².) indicates that they are morbidly/severely obese (BMI > 40 or > 35 with obesity - related health condition) with health conditions that include obstructive sleep apnea and hypertension . Weight is unchanged. BMI  is above average; BMI management plan is completed. We discussed portion control and increasing exercise.       Check labs  Tdap  Shingrix #1 today  Follow up in 2 months for shingrix #2   During this visit for their annual exam, we reviewed their personal history, social history and family history. We went over their medications and all the recommended health maintenance items for their age group. They were given the opportunity to ask questions and discuss other concerns.   Discussed importance of regular exercise and recommended starting or continuing a regular exercise program for good health. The patient was also encouraged to lose weight for better health.         Follow Up   Return in about 6 months (around 8/17/2025) for HTN follow up.  Patient was given instructions and counseling regarding his condition or for health maintenance advice. Please see specific information pulled into the AVS if appropriate.

## 2025-02-17 NOTE — ASSESSMENT & PLAN NOTE
Patient's (Body mass index is 36.69 kg/m².) indicates that they are morbidly/severely obese (BMI > 40 or > 35 with obesity - related health condition) with health conditions that include obstructive sleep apnea and hypertension . Weight is unchanged. BMI  is above average; BMI management plan is completed. We discussed portion control and increasing exercise.

## 2025-02-18 ENCOUNTER — TELEPHONE (OUTPATIENT)
Dept: FAMILY MEDICINE CLINIC | Facility: CLINIC | Age: 60
End: 2025-02-18
Payer: COMMERCIAL

## 2025-02-18 LAB
ALBUMIN SERPL-MCNC: 4.1 G/DL (ref 3.5–5.2)
ALBUMIN/GLOB SERPL: 1.4 G/DL
ALP SERPL-CCNC: 62 U/L (ref 39–117)
ALT SERPL W P-5'-P-CCNC: 22 U/L (ref 1–41)
ANION GAP SERPL CALCULATED.3IONS-SCNC: 6.2 MMOL/L (ref 5–15)
AST SERPL-CCNC: 23 U/L (ref 1–40)
BASOPHILS # BLD AUTO: 0.08 10*3/MM3 (ref 0–0.2)
BASOPHILS NFR BLD AUTO: 1.4 % (ref 0–1.5)
BILIRUB SERPL-MCNC: 0.6 MG/DL (ref 0–1.2)
BUN SERPL-MCNC: 12 MG/DL (ref 6–20)
BUN/CREAT SERPL: 13.8 (ref 7–25)
CALCIUM SPEC-SCNC: 9.4 MG/DL (ref 8.6–10.5)
CHLORIDE SERPL-SCNC: 101 MMOL/L (ref 98–107)
CO2 SERPL-SCNC: 29.8 MMOL/L (ref 22–29)
CREAT SERPL-MCNC: 0.87 MG/DL (ref 0.76–1.27)
DEPRECATED RDW RBC AUTO: 41.6 FL (ref 37–54)
EGFRCR SERPLBLD CKD-EPI 2021: 100.6 ML/MIN/1.73
EOSINOPHIL # BLD AUTO: 0.58 10*3/MM3 (ref 0–0.4)
EOSINOPHIL NFR BLD AUTO: 10.2 % (ref 0.3–6.2)
ERYTHROCYTE [DISTWIDTH] IN BLOOD BY AUTOMATED COUNT: 13.1 % (ref 12.3–15.4)
GLOBULIN UR ELPH-MCNC: 3 GM/DL
GLUCOSE SERPL-MCNC: 100 MG/DL (ref 65–99)
HCT VFR BLD AUTO: 42.9 % (ref 37.5–51)
HCV AB SER QL: NORMAL
HGB BLD-MCNC: 13.9 G/DL (ref 13–17.7)
IMM GRANULOCYTES # BLD AUTO: 0.02 10*3/MM3 (ref 0–0.05)
IMM GRANULOCYTES NFR BLD AUTO: 0.4 % (ref 0–0.5)
LYMPHOCYTES # BLD AUTO: 1.51 10*3/MM3 (ref 0.7–3.1)
LYMPHOCYTES NFR BLD AUTO: 26.5 % (ref 19.6–45.3)
MCH RBC QN AUTO: 28.4 PG (ref 26.6–33)
MCHC RBC AUTO-ENTMCNC: 32.4 G/DL (ref 31.5–35.7)
MCV RBC AUTO: 87.7 FL (ref 79–97)
MONOCYTES # BLD AUTO: 0.5 10*3/MM3 (ref 0.1–0.9)
MONOCYTES NFR BLD AUTO: 8.8 % (ref 5–12)
NEUTROPHILS NFR BLD AUTO: 3.01 10*3/MM3 (ref 1.7–7)
NEUTROPHILS NFR BLD AUTO: 52.7 % (ref 42.7–76)
NRBC BLD AUTO-RTO: 0 /100 WBC (ref 0–0.2)
PLATELET # BLD AUTO: 202 10*3/MM3 (ref 140–450)
PMV BLD AUTO: 11.1 FL (ref 6–12)
POTASSIUM SERPL-SCNC: 4 MMOL/L (ref 3.5–5.2)
PROT SERPL-MCNC: 7.1 G/DL (ref 6–8.5)
PSA SERPL-MCNC: 0.88 NG/ML (ref 0–4)
RBC # BLD AUTO: 4.89 10*6/MM3 (ref 4.14–5.8)
SODIUM SERPL-SCNC: 137 MMOL/L (ref 136–145)
TSH SERPL DL<=0.05 MIU/L-ACNC: 1.76 UIU/ML (ref 0.27–4.2)
WBC NRBC COR # BLD AUTO: 5.7 10*3/MM3 (ref 3.4–10.8)

## 2025-02-18 NOTE — TELEPHONE ENCOUNTER
Relay: Called patient, unable to reach. Left detailed voice message. Please relay message below.     ----- Message from Cherelle Bernabe sent at 2/18/2025  8:23 AM EST -----  Please let pt know that labs were ok. A1C was 5.8. it was improved, but still in prediabetic range. Work on diet like we discussed

## 2025-02-18 NOTE — PROGRESS NOTES
Please let pt know that labs were ok. A1C was 5.8. it was improved, but still in prediabetic range. Work on diet like we discussed

## 2025-03-17 ENCOUNTER — OFFICE VISIT (OUTPATIENT)
Dept: CARDIOLOGY | Facility: CLINIC | Age: 60
End: 2025-03-17
Payer: COMMERCIAL

## 2025-03-17 VITALS
DIASTOLIC BLOOD PRESSURE: 87 MMHG | OXYGEN SATURATION: 97 % | HEIGHT: 72 IN | BODY MASS INDEX: 36.84 KG/M2 | WEIGHT: 272 LBS | HEART RATE: 64 BPM | SYSTOLIC BLOOD PRESSURE: 130 MMHG

## 2025-03-17 DIAGNOSIS — I48.91 ATRIAL FIBRILLATION WITH RVR: Primary | ICD-10-CM

## 2025-03-17 DIAGNOSIS — R00.2 PALPITATIONS: ICD-10-CM

## 2025-03-17 DIAGNOSIS — I10 PRIMARY HYPERTENSION: ICD-10-CM

## 2025-03-17 DIAGNOSIS — I48.92 ATRIAL FLUTTER, UNSPECIFIED TYPE: ICD-10-CM

## 2025-03-17 PROCEDURE — 99213 OFFICE O/P EST LOW 20 MIN: CPT | Performed by: INTERNAL MEDICINE

## 2025-03-17 PROCEDURE — 93000 ELECTROCARDIOGRAM COMPLETE: CPT | Performed by: INTERNAL MEDICINE

## 2025-03-17 RX ORDER — METOPROLOL SUCCINATE 25 MG/1
25 TABLET, EXTENDED RELEASE ORAL
Qty: 90 TABLET | Refills: 3 | Status: SHIPPED | OUTPATIENT
Start: 2025-03-17 | End: 2026-03-12

## 2025-03-17 NOTE — PROGRESS NOTES
CC--- atrial flutter    Sub  59-year-old pleasant patient had incessant atrial flutter and patient underwent ablation for atrial flutter and atrial fibrillation in December 2024 and comes in for follow-up.  Patient has normal EF and history of nocturnal bradycardia in the past.  Patient was on amiodarone in the past.        Past Medical History:   Diagnosis Date    Abnormal ECG 11/15/24    Arrhythmia 11/15/24    Atrial fibrillation 11/15/24    Hypertension      No past surgical history on file.  Family History   Problem Relation Age of Onset    Valvular heart disease Mother     Other Mother         pacemaker    Coronary artery disease Father         CABG x 2     Diabetes Sister     Obesity Sister          Physical Exam    General:      well developed, well nourished, in no acute distress.    Head:      normocephalic and atraumatic.    Eyes:      PERRL/EOM intact, conjunctivae and sclerae clear without nystagmus.    Neck:      no  thyromegaly, trachea central with normal respiratory effort  Lungs:      clear bilaterally to auscultation.    Heart:       regular rate and rhythm, S1, S2 without murmurs, rubs, or gallops  Skin:      intact without lesions or rashes.    Psych:      alert and cooperative; normal mood and affect; normal attention span and concentration.        Assessment and plan    Recurrent atrial flutter with induction of atrial fibrillation and left atrial flutter post ablation December 2024 currently in sinus rhythm on aspirin  Mild hypertension well-controlled on metoprolol  Medications reviewed and follow-up appointments made  Recent labs include TSH normal.  Potassium 4.0 creatinine normal LFTs normal and hemoglobin platelets normal  Meds refilled    ECG 12 Lead    Date/Time: 3/17/2025 10:07 AM  Performed by: Angelito Garcia MD    Authorized by: Angelito Garcia MD  Comparison: compared with previous ECG   Similar to previous ECG  Rhythm: sinus rhythm  Rate: normal  Conduction: conduction  normal  Other findings: non-specific ST-T wave changes      Electronically signed by Angelito Garcia MD, 03/17/25, 10:07 AM EDT.

## 2025-03-17 NOTE — LETTER
March 17, 2025     UMESH Nichols  [address]  [address]  [place] [address]    Patient: Kenny Cruz   YOB: 1965   Date of Visit: 3/17/2025     Dear UMESH Nichols:       Thank you for referring Kenny Cruz to me for evaluation. Below are the relevant portions of my assessment and plan of care.    If you have questions, please do not hesitate to call me. I look forward to following Kenny along with you.         Sincerely,        Angelito Garcia MD        CC: No Recipients    Angelito Garcia MD  03/17/25 1012  Sign when Signing Visit  CC--- atrial flutter    Sub  59-year-old pleasant patient had incessant atrial flutter and patient underwent ablation for atrial flutter and atrial fibrillation in December 2024 and comes in for follow-up.  Patient has normal EF and history of nocturnal bradycardia in the past.  Patient was on amiodarone in the past.        Past Medical History:   Diagnosis Date   • Abnormal ECG 11/15/24   • Arrhythmia 11/15/24   • Atrial fibrillation 11/15/24   • Hypertension      No past surgical history on file.  Family History   Problem Relation Age of Onset   • Valvular heart disease Mother    • Other Mother         pacemaker   • Coronary artery disease Father         CABG x 2    • Diabetes Sister    • Obesity Sister          Physical Exam    General:      well developed, well nourished, in no acute distress.    Head:      normocephalic and atraumatic.    Eyes:      PERRL/EOM intact, conjunctivae and sclerae clear without nystagmus.    Neck:      no  thyromegaly, trachea central with normal respiratory effort  Lungs:      clear bilaterally to auscultation.    Heart:       regular rate and rhythm, S1, S2 without murmurs, rubs, or gallops  Skin:      intact without lesions or rashes.    Psych:      alert and cooperative; normal mood and affect; normal attention span and concentration.        Assessment and plan    Recurrent atrial flutter with  induction of atrial fibrillation and left atrial flutter post ablation December 2024 currently in sinus rhythm on aspirin  Mild hypertension well-controlled on metoprolol  Medications reviewed and follow-up appointments made  Recent labs include TSH normal.  Potassium 4.0 creatinine normal LFTs normal and hemoglobin platelets normal  Meds refilled    ECG 12 Lead    Date/Time: 3/17/2025 10:07 AM  Performed by: Angelito Garcia MD    Authorized by: Angelito Garcia MD  Comparison: compared with previous ECG   Similar to previous ECG  Rhythm: sinus rhythm  Rate: normal  Conduction: conduction normal  Other findings: non-specific ST-T wave changes      Electronically signed by Angelito Garcia MD, 03/17/25, 10:07 AM EDT.

## 2025-04-28 ENCOUNTER — CLINICAL SUPPORT (OUTPATIENT)
Dept: FAMILY MEDICINE CLINIC | Facility: CLINIC | Age: 60
End: 2025-04-28
Payer: COMMERCIAL

## 2025-04-28 DIAGNOSIS — Z23 NEED FOR SHINGLES VACCINE: Primary | ICD-10-CM

## 2025-04-28 PROCEDURE — 90750 HZV VACC RECOMBINANT IM: CPT | Performed by: NURSE PRACTITIONER

## 2025-08-18 ENCOUNTER — OFFICE VISIT (OUTPATIENT)
Dept: FAMILY MEDICINE CLINIC | Facility: CLINIC | Age: 60
End: 2025-08-18
Payer: COMMERCIAL

## 2025-08-18 VITALS
RESPIRATION RATE: 18 BRPM | HEIGHT: 72 IN | SYSTOLIC BLOOD PRESSURE: 126 MMHG | DIASTOLIC BLOOD PRESSURE: 78 MMHG | WEIGHT: 273.2 LBS | BODY MASS INDEX: 37 KG/M2 | HEART RATE: 76 BPM | OXYGEN SATURATION: 96 %

## 2025-08-18 DIAGNOSIS — I10 PRIMARY HYPERTENSION: Primary | ICD-10-CM

## 2025-08-18 PROBLEM — G47.33 OSA (OBSTRUCTIVE SLEEP APNEA): Status: ACTIVE | Noted: 2025-08-18

## 2025-08-18 PROCEDURE — 99213 OFFICE O/P EST LOW 20 MIN: CPT | Performed by: NURSE PRACTITIONER

## (undated) DEVICE — Device: Brand: TORAYGUIDE GUIDEWIRE

## (undated) DEVICE — CATHETER CONNECTION CABLE: Brand: FARASTAR™

## (undated) DEVICE — Device: Brand: WEBSTER CS

## (undated) DEVICE — SOUNDSTAR ECO 8F G CATHETER: Brand: SOUNDSTAR

## (undated) DEVICE — Device: Brand: SMARTABLATE

## (undated) DEVICE — ELECTRD DEFIB M/FUNC PROPADZ RADIOL 2PK

## (undated) DEVICE — PINNACLE INTRODUCER SHEATH: Brand: PINNACLE

## (undated) DEVICE — Device: Brand: NRG TRANSSEPTAL NEEDLE

## (undated) DEVICE — INTRO PERFORMER CHECKFLO/LG RAD/BND NO/GW 18F .038IN 30CM

## (undated) DEVICE — PAD E/S GRND SGL/FOIL 9FT/CORD DISP

## (undated) DEVICE — Device

## (undated) DEVICE — BI-DIRECTIONAL NAVIGATION CATHETER, NAV, D-F: Brand: QDOT MICRO

## (undated) DEVICE — PK TRY HEART CATH 50

## (undated) DEVICE — SHT AIR TRANSFR COMFRT GLIDE LAT 40X80IN

## (undated) DEVICE — STEERABLE SHEATH CLEAR: Brand: FARADRIVE™

## (undated) DEVICE — PREF.GUIDING SHEATH W/MULT.CRV: Brand: PREFACE

## (undated) DEVICE — Device: Brand: RFP-100A CONNECTOR CABLE

## (undated) DEVICE — BLANKT WARM UNDER/BDY FUL/ACC A/ 90X206CM

## (undated) DEVICE — OCTA,PERSEID,2-2-2-2-2,F-CURVE: Brand: OCTARAY MAPPING CATHETER

## (undated) DEVICE — TX ECO EXT CABLE: Brand: TX ECO EXT CABLE

## (undated) DEVICE — PULSED FIELD ABLATION CATHETER: Brand: FARAWAVE™

## (undated) DEVICE — Device: Brand: REFERENCE PATCH CARTO 3

## (undated) DEVICE — CABL PACE BIPOL THRSHLD SHROUD PIN 8FT